# Patient Record
Sex: MALE | Race: WHITE | Employment: OTHER | URBAN - METROPOLITAN AREA
[De-identification: names, ages, dates, MRNs, and addresses within clinical notes are randomized per-mention and may not be internally consistent; named-entity substitution may affect disease eponyms.]

---

## 2017-01-21 DIAGNOSIS — K21.9 GASTROESOPHAGEAL REFLUX DISEASE WITHOUT ESOPHAGITIS: ICD-10-CM

## 2017-01-23 RX ORDER — PANTOPRAZOLE SODIUM 40 MG/1
TABLET, DELAYED RELEASE ORAL
Qty: 30 TABLET | Refills: 1 | Status: SHIPPED | OUTPATIENT
Start: 2017-01-23 | End: 2017-03-22 | Stop reason: SDUPTHER

## 2017-01-24 ENCOUNTER — HOSPITAL ENCOUNTER (OUTPATIENT)
Dept: CT IMAGING | Age: 67
Discharge: HOME | End: 2017-01-24
Attending: RADIOLOGY | Admitting: RADIOLOGY

## 2017-01-24 DIAGNOSIS — C61 MALIGNANT NEOPLASM OF PROSTATE (HCC): ICD-10-CM

## 2017-01-24 LAB
GFR AFRICAN AMERICAN: >60 ML/MIN/1.73M2
GFR NON-AFRICAN AMERICAN: 57 ML/MIN/1.73M2
POC CREATININE: 1.3 MG/DL (ref 0.9–1.3)

## 2017-01-24 RX ORDER — TC 99M MEDRONATE 20 MG/10ML
25 INJECTION, POWDER, LYOPHILIZED, FOR SOLUTION INTRAVENOUS
Status: COMPLETED | OUTPATIENT
Start: 2017-01-24 | End: 2017-01-24

## 2017-01-24 RX ORDER — 0.9 % SODIUM CHLORIDE 0.9 %
10 VIAL (ML) INJECTION
Status: COMPLETED | OUTPATIENT
Start: 2017-01-24 | End: 2017-01-24

## 2017-01-24 RX ADMIN — TC 99M MEDRONATE 25 MILLICURIE: 20 INJECTION, POWDER, LYOPHILIZED, FOR SOLUTION INTRAVENOUS at 10:40

## 2017-01-24 RX ADMIN — Medication 10 ML: at 12:57

## 2017-03-22 DIAGNOSIS — K21.9 GASTROESOPHAGEAL REFLUX DISEASE WITHOUT ESOPHAGITIS: ICD-10-CM

## 2017-03-22 RX ORDER — PANTOPRAZOLE SODIUM 40 MG/1
TABLET, DELAYED RELEASE ORAL
Qty: 30 TABLET | Refills: 1 | Status: SHIPPED | OUTPATIENT
Start: 2017-03-22 | End: 2017-06-26 | Stop reason: SDUPTHER

## 2017-05-22 DIAGNOSIS — K21.9 GASTROESOPHAGEAL REFLUX DISEASE WITHOUT ESOPHAGITIS: ICD-10-CM

## 2017-05-22 RX ORDER — PANTOPRAZOLE SODIUM 40 MG/1
TABLET, DELAYED RELEASE ORAL
Qty: 30 TABLET | Refills: 1 | OUTPATIENT
Start: 2017-05-22

## 2017-06-06 ENCOUNTER — OFFICE VISIT (OUTPATIENT)
Dept: FAMILY MEDICINE CLINIC | Age: 67
End: 2017-06-06

## 2017-06-06 VITALS
HEART RATE: 60 BPM | BODY MASS INDEX: 31.28 KG/M2 | WEIGHT: 236 LBS | HEIGHT: 73 IN | DIASTOLIC BLOOD PRESSURE: 68 MMHG | SYSTOLIC BLOOD PRESSURE: 112 MMHG | OXYGEN SATURATION: 97 % | RESPIRATION RATE: 15 BRPM

## 2017-06-06 DIAGNOSIS — I10 ESSENTIAL HYPERTENSION: ICD-10-CM

## 2017-06-06 DIAGNOSIS — I25.2 HISTORY OF MI (MYOCARDIAL INFARCTION): ICD-10-CM

## 2017-06-06 DIAGNOSIS — C67.2 MALIGNANT NEOPLASM OF LATERAL WALL OF URINARY BLADDER (HCC): Primary | ICD-10-CM

## 2017-06-06 DIAGNOSIS — F10.20 UNCOMPLICATED ALCOHOL DEPENDENCE (HCC): ICD-10-CM

## 2017-06-06 DIAGNOSIS — R35.0 URINARY FREQUENCY: ICD-10-CM

## 2017-06-06 DIAGNOSIS — K21.9 GASTROESOPHAGEAL REFLUX DISEASE WITHOUT ESOPHAGITIS: ICD-10-CM

## 2017-06-06 DIAGNOSIS — Z72.0 TOBACCO USE: ICD-10-CM

## 2017-06-06 DIAGNOSIS — J44.9 COPD, SEVERE (HCC): ICD-10-CM

## 2017-06-06 DIAGNOSIS — N52.31 ERECTILE DYSFUNCTION FOLLOWING RADICAL PROSTATECTOMY: ICD-10-CM

## 2017-06-06 DIAGNOSIS — C61 PROSTATE CANCER (HCC): ICD-10-CM

## 2017-06-06 PROCEDURE — 99214 OFFICE O/P EST MOD 30 MIN: CPT | Performed by: FAMILY MEDICINE

## 2017-06-06 ASSESSMENT — PATIENT HEALTH QUESTIONNAIRE - PHQ9
2. FEELING DOWN, DEPRESSED OR HOPELESS: 1
1. LITTLE INTEREST OR PLEASURE IN DOING THINGS: 1
SUM OF ALL RESPONSES TO PHQ9 QUESTIONS 1 & 2: 2
SUM OF ALL RESPONSES TO PHQ QUESTIONS 1-9: 2

## 2017-06-06 ASSESSMENT — ENCOUNTER SYMPTOMS
NAUSEA: 0
CHEST TIGHTNESS: 0
VOMITING: 0
WHEEZING: 0
SORE THROAT: 0
BLOOD IN STOOL: 0
DIARRHEA: 0
COUGH: 0
ALLERGIC/IMMUNOLOGIC NEGATIVE: 1
CONSTIPATION: 0
SINUS PRESSURE: 0
SHORTNESS OF BREATH: 1
ABDOMINAL PAIN: 0
BACK PAIN: 0
RHINORRHEA: 0

## 2017-06-26 DIAGNOSIS — K21.9 GASTROESOPHAGEAL REFLUX DISEASE WITHOUT ESOPHAGITIS: ICD-10-CM

## 2017-06-26 RX ORDER — PANTOPRAZOLE SODIUM 40 MG/1
40 TABLET, DELAYED RELEASE ORAL DAILY
Qty: 90 TABLET | Refills: 3 | Status: SHIPPED | OUTPATIENT
Start: 2017-06-26 | End: 2017-09-27 | Stop reason: SDUPTHER

## 2017-09-27 ENCOUNTER — OFFICE VISIT (OUTPATIENT)
Dept: FAMILY MEDICINE CLINIC | Age: 67
End: 2017-09-27

## 2017-09-27 VITALS
WEIGHT: 233.2 LBS | BODY MASS INDEX: 30.91 KG/M2 | HEIGHT: 73 IN | OXYGEN SATURATION: 95 % | RESPIRATION RATE: 16 BRPM | HEART RATE: 58 BPM | DIASTOLIC BLOOD PRESSURE: 74 MMHG | SYSTOLIC BLOOD PRESSURE: 102 MMHG

## 2017-09-27 DIAGNOSIS — C64.1 MALIGNANT TUMOR OF KIDNEY, RIGHT (HCC): ICD-10-CM

## 2017-09-27 DIAGNOSIS — C67.2 MALIGNANT NEOPLASM OF LATERAL WALL OF URINARY BLADDER (HCC): ICD-10-CM

## 2017-09-27 DIAGNOSIS — H00.012 HORDEOLUM EXTERNUM OF RIGHT LOWER EYELID: Primary | ICD-10-CM

## 2017-09-27 DIAGNOSIS — K21.9 GASTROESOPHAGEAL REFLUX DISEASE WITHOUT ESOPHAGITIS: ICD-10-CM

## 2017-09-27 PROCEDURE — 99213 OFFICE O/P EST LOW 20 MIN: CPT | Performed by: FAMILY MEDICINE

## 2017-09-27 PROCEDURE — 3017F COLORECTAL CA SCREEN DOC REV: CPT | Performed by: FAMILY MEDICINE

## 2017-09-27 PROCEDURE — G8427 DOCREV CUR MEDS BY ELIG CLIN: HCPCS | Performed by: FAMILY MEDICINE

## 2017-09-27 PROCEDURE — 4004F PT TOBACCO SCREEN RCVD TLK: CPT | Performed by: FAMILY MEDICINE

## 2017-09-27 PROCEDURE — 1123F ACP DISCUSS/DSCN MKR DOCD: CPT | Performed by: FAMILY MEDICINE

## 2017-09-27 PROCEDURE — G8599 NO ASA/ANTIPLAT THER USE RNG: HCPCS | Performed by: FAMILY MEDICINE

## 2017-09-27 PROCEDURE — G8417 CALC BMI ABV UP PARAM F/U: HCPCS | Performed by: FAMILY MEDICINE

## 2017-09-27 PROCEDURE — 4040F PNEUMOC VAC/ADMIN/RCVD: CPT | Performed by: FAMILY MEDICINE

## 2017-09-27 RX ORDER — TETANUS AND DIPHTHERIA TOXOIDS ADSORBED 2; 2 [LF]/.5ML; [LF]/.5ML
0.5 INJECTION INTRAMUSCULAR ONCE
Qty: 0.5 ML | Refills: 0 | Status: CANCELLED | OUTPATIENT
Start: 2017-09-27 | End: 2017-09-27

## 2017-09-27 RX ORDER — PANTOPRAZOLE SODIUM 40 MG/1
40 TABLET, DELAYED RELEASE ORAL DAILY
Qty: 90 TABLET | Refills: 3 | Status: SHIPPED | OUTPATIENT
Start: 2017-09-27 | End: 2018-10-01 | Stop reason: SDUPTHER

## 2017-10-07 PROBLEM — C64.9 MALIGNANT TUMOR OF KIDNEY (HCC): Status: ACTIVE | Noted: 2017-10-07

## 2017-10-07 PROBLEM — H00.012 HORDEOLUM EXTERNUM OF RIGHT LOWER EYELID: Status: ACTIVE | Noted: 2017-10-07

## 2017-10-07 ASSESSMENT — ENCOUNTER SYMPTOMS
RHINORRHEA: 0
VOMITING: 0
SORE THROAT: 0
CONSTIPATION: 0
NAUSEA: 0
SINUS PRESSURE: 0
BACK PAIN: 0
SHORTNESS OF BREATH: 0
BLOOD IN STOOL: 0
ABDOMINAL PAIN: 0
WHEEZING: 0
CHEST TIGHTNESS: 0
DIARRHEA: 0
ALLERGIC/IMMUNOLOGIC NEGATIVE: 1
COUGH: 0

## 2017-10-07 NOTE — PROGRESS NOTES
Inject 0.5 mLs into the muscle once for 1 dose         GERD (gastroesophageal reflux disease)  Better with protonix. Discussed risks and report of osteoporosis and dementia vs benefits. Hordeolum externum of right lower eyelid  Stye on lower R eye lid. No conjunctivitis. Should resolve with warm compresses. Malignant neoplasm of lateral wall of urinary bladder (HCC)  Seeing Dr Adrián Edmonds, had a recent cystoscopy    Malignant tumor of kidney [facility])  Now has a renal tumor - ? Primary vs metastaic bladde cancer. Pt was told bladder cancers ne er migratge to kidney but gthis is transitional cell cancer and pt does lie down so may be reflux driven bladder tumor. Urology managing.

## 2017-10-07 NOTE — ASSESSMENT & PLAN NOTE
Now has a renal tumor - ? Primary vs metastaic bladde cancer. Pt was told bladder cancers ne er migratge to kidney but gthis is transitional cell cancer and pt does lie down so may be reflux driven bladder tumor. Urology managing.

## 2017-12-01 ENCOUNTER — HOSPITAL ENCOUNTER (OUTPATIENT)
Dept: OTHER | Age: 67
Discharge: HOME | End: 2017-12-01
Attending: RADIOLOGY | Admitting: RADIOLOGY

## 2017-12-01 LAB — PSA ULTRASENSITIVE: 0.08 NG/ML (ref 0–4)

## 2018-03-28 LAB
BASOPHILS ABSOLUTE: 0.03 /ΜL
BASOPHILS RELATIVE PERCENT: 0.3 %
BILIRUBIN, URINE: ABNORMAL
BLOOD, URINE: POSITIVE
BUN BLDV-MCNC: 16 MG/DL
CALCIUM SERPL-MCNC: 9.5 MG/DL
CHLORIDE BLD-SCNC: 90 MMOL/L
CLARITY: ABNORMAL
CO2: 24 MMOL/L
COLOR: ABNORMAL
CREAT SERPL-MCNC: 2.03 MG/DL
EOSINOPHILS ABSOLUTE: 0.06 /ΜL
EOSINOPHILS RELATIVE PERCENT: 5.2 %
GFR CALCULATED: 33
GLUCOSE BLD-MCNC: 104 MG/DL
GLUCOSE URINE: NEGATIVE
HCT VFR BLD CALC: 33 % (ref 41–53)
HEMOGLOBIN: 11.1 G/DL (ref 13.5–17.5)
KETONES, URINE: POSITIVE
LEUKOCYTE ESTERASE, URINE: POSITIVE
LYMPHOCYTES ABSOLUTE: 1.05 /ΜL
LYMPHOCYTES RELATIVE PERCENT: 9.3 %
MCH RBC QN AUTO: 28.8 PG
MCHC RBC AUTO-ENTMCNC: 33.6 G/DL
MCV RBC AUTO: 85.5 FL
MONOCYTES ABSOLUTE: 1.13 /ΜL
MONOCYTES RELATIVE PERCENT: 10 %
NEUTROPHILS ABSOLUTE: 8.47 /ΜL
NEUTROPHILS RELATIVE PERCENT: 74.8 %
NITRITE, URINE: POSITIVE
PDW BLD-RTO: 13.9 %
PH UA: 6.5 (ref 4.5–8)
PLATELET # BLD: 298 K/ΜL
PMV BLD AUTO: 9.6 FL
POTASSIUM SERPL-SCNC: 3.6 MMOL/L
PROTEIN UA: POSITIVE
RBC # BLD: 3.86 10^6/ΜL
SODIUM BLD-SCNC: 124 MMOL/L
SPECIFIC GRAVITY, URINE: 1.01
UROBILINOGEN, URINE: ABNORMAL
WBC # BLD: 11.32 10^3/ML

## 2018-06-04 ENCOUNTER — HOSPITAL ENCOUNTER (OUTPATIENT)
Dept: OTHER | Age: 68
Discharge: HOME | End: 2018-06-04
Attending: RADIOLOGY | Admitting: RADIOLOGY

## 2018-06-04 LAB — PSA ULTRASENSITIVE: 0.05 NG/ML (ref 0–4)

## 2018-10-01 DIAGNOSIS — K21.9 GASTROESOPHAGEAL REFLUX DISEASE WITHOUT ESOPHAGITIS: ICD-10-CM

## 2018-10-01 RX ORDER — PANTOPRAZOLE SODIUM 40 MG/1
40 TABLET, DELAYED RELEASE ORAL DAILY
Qty: 90 TABLET | Refills: 0 | Status: SHIPPED | OUTPATIENT
Start: 2018-10-01 | End: 2018-12-31 | Stop reason: SDUPTHER

## 2019-02-05 ENCOUNTER — HOSPITAL ENCOUNTER (OUTPATIENT)
Age: 69
Setting detail: SPECIMEN
Discharge: HOME | End: 2019-02-05
Payer: MEDICARE

## 2019-02-05 LAB — PSA ULTRASENSITIVE: 0.03 NG/ML (ref 0–4)

## 2019-02-05 PROCEDURE — 84153 ASSAY OF PSA TOTAL: CPT

## 2019-07-02 DIAGNOSIS — K21.9 GASTROESOPHAGEAL REFLUX DISEASE WITHOUT ESOPHAGITIS: ICD-10-CM

## 2019-07-02 RX ORDER — PANTOPRAZOLE SODIUM 40 MG/1
40 TABLET, DELAYED RELEASE ORAL DAILY
Qty: 90 TABLET | Refills: 1 | Status: SHIPPED | OUTPATIENT
Start: 2019-07-02 | End: 2020-01-07

## 2020-01-07 RX ORDER — PANTOPRAZOLE SODIUM 40 MG/1
40 TABLET, DELAYED RELEASE ORAL DAILY
Qty: 90 TABLET | Refills: 1 | Status: SHIPPED | OUTPATIENT
Start: 2020-01-07

## 2020-02-25 ENCOUNTER — HOSPITAL ENCOUNTER (OUTPATIENT)
Age: 70
Setting detail: SPECIMEN
Discharge: HOME | End: 2020-02-25
Payer: MEDICARE

## 2020-02-25 LAB — PSA ULTRASENSITIVE: 0.03 NG/ML (ref 0–4)

## 2020-02-25 PROCEDURE — 84153 ASSAY OF PSA TOTAL: CPT

## 2020-02-25 PROCEDURE — 36415 COLL VENOUS BLD VENIPUNCTURE: CPT

## 2020-03-05 ENCOUNTER — HOSPITAL ENCOUNTER (OUTPATIENT)
Dept: RADIATION ONCOLOGY | Age: 70
Discharge: HOME | End: 2020-03-05
Payer: MEDICARE

## 2020-03-05 VITALS
SYSTOLIC BLOOD PRESSURE: 117 MMHG | TEMPERATURE: 98.8 F | WEIGHT: 210 LBS | HEART RATE: 56 BPM | OXYGEN SATURATION: 95 % | RESPIRATION RATE: 16 BRPM | HEIGHT: 74 IN | DIASTOLIC BLOOD PRESSURE: 69 MMHG | BODY MASS INDEX: 26.95 KG/M2

## 2020-03-05 PROCEDURE — 99213 OFFICE O/P EST LOW 20 MIN: CPT | Performed by: RADIOLOGY

## 2020-03-05 PROCEDURE — 99211 OFF/OP EST MAY X REQ PHY/QHP: CPT | Performed by: RADIOLOGY

## 2020-03-05 RX ORDER — TAMSULOSIN HYDROCHLORIDE 0.4 MG/1
0.4 CAPSULE ORAL DAILY
COMMUNITY

## 2020-03-05 ASSESSMENT — PAIN SCALES - GENERAL: PAINLEVEL_OUTOF10: 0

## 2020-03-05 NOTE — PROGRESS NOTES
[id number] [facility]  [address] [facility], [address] [facility]  Phone: [phone]  Fax: [phone]    RADIATION ONCOLOGY FOLLOW UP REPORT    PATIENT NAME:  Stephanie Moise              : 1950  MEDICAL RECORD NO: [id number]    [facility] NO: [id number]        PROVIDER: Lanning Mortimer, MD      DATE OF SERVICE: 3/5/2020     Other Physicians:    TREATMENT COURSE:      [facility]    2015 Initial Diagnosis     pT2c adenoCA Prostate cancer ([facility] [place] 7. PSA=4.68      12/10/2015 Surgery     LARP. +perineural invasion & questionable anterior margin with subsequent biochemical failure of PSA of 0.4 ng per mL on 10/19/16      2017 - 3/24/2017 Radiation     Prostatic fossa: 77 Gray             HPI:   Stephanie Moise is a 68 y.o. male who has a history of superficial bladder cancer status post TURBT/BCG and  and  who also has a history of prostate cancer as above who returns today for routine follow-up visit. He was last seen by me in 2019 and was doing well at that time without evidence of recurrent or metastatic disease. His most recent PSA was obtained on 20 and found to be 0.03 ng per mL. His most recent cystoscopy was performed at [facility] 2 weeks ago showing benign findings. Currently, the patient reports he's been doing well. His energy level has been fairly good overall. He denies any fevers, chills, or drenching night sweats. His weight and appetite have been stable. He denies any chest pain or shortness of breath. He has not noticed any new lumps or bumps anywhere throughout his body. He denies the new onset of bony pain. He has not been experiencing any pain within the abdomen or pelvis. He denies any hematuria, diarrhea, melena, or hematochezia. He has rare mild dysuria. He gets up an either 2-1/2 or 5 hours after going to bed in the every hour until morning subsequently.   He does report

## 2021-03-04 ENCOUNTER — HOSPITAL ENCOUNTER (OUTPATIENT)
Dept: INFUSION THERAPY | Age: 71
Discharge: HOME | End: 2021-03-04
Payer: MEDICARE

## 2021-03-04 DIAGNOSIS — C61 PROSTATE CANCER (HCC): ICD-10-CM

## 2021-03-04 DIAGNOSIS — C61 PROSTATE CANCER (HCC): Primary | ICD-10-CM

## 2021-03-04 LAB — PSA ULTRASENSITIVE: 0.03 NG/ML (ref 0–4)

## 2021-03-04 PROCEDURE — 84153 ASSAY OF PSA TOTAL: CPT

## 2021-03-04 PROCEDURE — 36415 COLL VENOUS BLD VENIPUNCTURE: CPT

## 2021-03-11 ENCOUNTER — HOSPITAL ENCOUNTER (OUTPATIENT)
Dept: RADIATION ONCOLOGY | Age: 71
Discharge: HOME | End: 2021-03-11
Payer: MEDICARE

## 2021-03-11 VITALS
DIASTOLIC BLOOD PRESSURE: 69 MMHG | RESPIRATION RATE: 16 BRPM | BODY MASS INDEX: 27.34 KG/M2 | HEIGHT: 74 IN | WEIGHT: 213 LBS | SYSTOLIC BLOOD PRESSURE: 120 MMHG | HEART RATE: 55 BPM | TEMPERATURE: 98.2 F

## 2021-03-11 DIAGNOSIS — C61 PROSTATE CANCER (HCC): Primary | ICD-10-CM

## 2021-03-11 PROCEDURE — 99211 OFF/OP EST MAY X REQ PHY/QHP: CPT

## 2021-03-11 PROCEDURE — 99213 OFFICE O/P EST LOW 20 MIN: CPT | Performed by: RADIOLOGY

## 2021-03-11 RX ORDER — ACETAMINOPHEN/DIPHENHYDRAMINE 500MG-25MG
2 TABLET ORAL NIGHTLY PRN
COMMUNITY

## 2021-03-11 NOTE — PROGRESS NOTES
[id number] [facility]  [address] [facility], [address] [facility]  Phone: [phone]  Fax: [phone]    RADIATION ONCOLOGY FOLLOW UP REPORT    PATIENT NAME:  Yaron Alvarado              : 1950  MEDICAL RECORD NO: [id number]    [facility] NO: [id number]        PROVIDER: Víctor Lai MD      DATE OF SERVICE: 3/11/2021     Other Physicians:  Meenu Schultz M.D.  [address] [facility] [place] [address]    Dr. Eren Canela        DIAGNOSIS: Cancer Staging  No matching staging information was found for the patient. TREATMENT COURSE:   Oncology History   Prostate cancer ([facility]   2015 Initial Diagnosis    pT2c adenoCA [facility] [place] 7. PSA=4.68     12/10/2015 Surgery    LARP. +perineural invasion & questionable anterior margin with subsequent biochemical failure of PSA of 0.4 ng per mL on 10/19/16     2017 - 3/24/2017 Radiation    Prostatic fossa: 77 Gray             HPI:   Yaron Alvarado is a 65 y.o. male who has a history as above as well as of superficial bladder cancer diagnosed in  status post TURBT and BCG who returns today for routine follow-up visit. He was last seen by me in in 2020 and was doing well at that time without evidence of recurrent or metastatic disease. His most recent PSA was 0.03 ng per ml - see lab results below. Currently, the patient reports he's been doing well. His energy level has been fairly good overall. He denies any fevers, chills, or drenching night sweats. His weight and appetite have been stable. He denies any chest pain or shortness of breath. He has not noticed any new lumps or bumps anywhere throughout his body. He denies the new onset of bony pain. He has not been experiencing any pain within the abdomen or pelvis. He denies any dysuria, hematuria, diarrhea, melena, or hematochezia. He gets up an average of 3-4x at night to urinate.   He reports he has been taking his Flomax in the morning.         LABORATORY STUDIES:   CBC:   Lab Results   Component Value Date    WBC 11.32 03/28/2018    RBC 3.86 03/28/2018    HGB 11.1 03/28/2018    HCT 33.0 03/28/2018    MCV 85.5 03/28/2018    MCH 28.8 03/28/2018    MCHC 33.6 03/28/2018    RDW 13.9 03/28/2018     03/28/2018    MPV 9.6 03/28/2018     CMP:  Lab Results   Component Value Date     03/28/2018    K 3.6 03/28/2018    CL 90 03/28/2018    CO2 24 03/28/2018    BUN 16 03/28/2018    CREATININE 2.03 03/28/2018    GFRAA >60 01/24/2017    AGRATIO 2.0 06/09/2014    LABGLOM 33 03/28/2018    LABGLOM 57 01/24/2017    GLUCOSE 104 03/28/2018    PROT 7.3 11/30/2015    PROT 7.0 03/22/2012    LABALBU 3.7 12/12/2015    CALCIUM 9.5 03/28/2018    BILITOT 0.5 11/30/2015    ALKPHOS 125 11/30/2015    AST 23 11/30/2015    ALT 22 11/30/2015     Onc labs:   3/4/2021  5:34 PM - [facility] Incoming Lab Results From [facility] [facility])    Component Value Ref Range & Units Status Collected Lab   PSA Ultrasensitive 0.03  0 - 4.0 NG/ML Final 03/04/2021  2:10 PM      2/25/2020  5:48 PM - [facility] Incoming Lab Results [facility] [facility]    Component Value Ref Range & Units Status Collected Lab   PSA Ultrasensitive 0.03  0 - 4.0 NG/ML Final 02/25/2020  2:50 PM          Lab Results   Component Value Date    PSA 5.0 06/09/2014        MEDICATIONS:   Current Outpatient Medications   Medication Sig Dispense Refill    diphenhydrAMINE-APAP, sleep, (TYLENOL PM EXTRA STRENGTH)  MG tablet Take 2 tablets by mouth nightly as needed for Sleep      SYMBICORT 160-4.5 MCG/ACT AERO TAKE 2 PUFFS BY MOUTH TWICE A DAY Dx COPD J44.9 10.2 Inhaler 5    tamsulosin (FLOMAX) 0.4 MG capsule Take 0.4 mg by mouth daily      pantoprazole (PROTONIX) 40 MG tablet TAKE 1 TABLET BY MOUTH DAILY 90 tablet 1    SPIRIVA HANDIHALER 18 MCG inhalation capsule INHALE THE CONTENTS OF 1 CAPSULE INTO THE LUNGS DAILY 90 capsule 3    metoprolol tartrate (LOPRESSOR) 50 MG tablet Take 1 tablet by mouth 2 times daily (Patient taking differently: Take 25 mg by mouth 2 times daily ) 60 tablet 1    atorvastatin (LIPITOR) 40 MG tablet Take 1 tablet by mouth daily 30 tablet 0    aspirin 81 MG EC tablet Take 1 tablet by mouth daily 30 tablet 0    triamterene-hydrochlorothiazide (MAXZIDE-25) 37.5-25 MG per tablet Take 1 tablet by mouth daily 30 tablet 11    acetaminophen (TYLENOL) 325 MG tablet Take 650 mg by mouth every 6 hours as needed. No current facility-administered medications for this encounter. EXAMINATION:   Vitals:    03/11/21 1320   BP: 120/69   Pulse: 55   Resp: 16   Temp: 98.2 °F (36.8 °C)     The patient is in no acute distress. Neck: Supple no preauricular postauricular, submental, submandibular, cervical, supraclavicular, or infraclavicular lymphadenopathy is present. Heart: Regular rate and rhythm. No murmurs, clicks, or gallops are present. Lungs: Bilaterally clear to auscultation. No rales, rhonchi, or wheezing are present. Abdomen: Soft, nontender and nondistended. No hepatosplenomegaly or masses are appreciated. Rectal exam: The prostatic fossa is without masses or areas of nodularity. Extremities: No cyanosis, clubbing, or edema is present. ASSESSMENT AND PLAN:     Ozzie Luna is a 65 y.o. male who has a history of prostate cancer who is now approximately 4 years after completion of his salvage prostatic fossa IMRT who is doing well this time without evidence of recurrent or metastatic disease. He is to follow up with urology as scheduled. He is to return to see me in 12 months with a PSA prior, or to return sooner as needed. He is to follow-up with Dr. Haroon Neves next week for a repeat cystoscopy as scheduled. The patient is to continue to follow CDC's Covid 19 precautionary measures.         Electronically signed by Wes Chaney MD on 3/11/2021 at 1:27 PM

## 2021-03-11 NOTE — PROGRESS NOTES
[place] Hull  3/11/2021    Patient is seen today for follow up. Vitals:    03/11/21 1320   BP: 120/69   Pulse: 55   Resp: 16   Temp: 98.2 °F (36.8 °C)        Wt Readings from Last 3 Encounters:   03/11/21 213 lb (96.6 kg)   01/04/21 207 lb (93.9 kg)   08/31/20 207 lb (93.9 kg)       Pain Assessment  Pain Assessment: 0-10  Pain Level: 0  Patient's Stated Pain Goal: No pain  Multiple Pain Sites: No  OTC Analgesics       Allergies   Allergen Reactions    Lisinopril Other (See Comments)     Cough        Current Outpatient Medications   Medication Sig Dispense Refill    diphenhydrAMINE-APAP, sleep, (TYLENOL PM EXTRA STRENGTH)  MG tablet Take 2 tablets by mouth nightly as needed for Sleep      SYMBICORT 160-4.5 MCG/ACT AERO TAKE 2 PUFFS BY MOUTH TWICE A DAY Dx COPD J44.9 10.2 Inhaler 5    tamsulosin (FLOMAX) 0.4 MG capsule Take 0.4 mg by mouth daily      pantoprazole (PROTONIX) 40 MG tablet TAKE 1 TABLET BY MOUTH DAILY 90 tablet 1    SPIRIVA HANDIHALER 18 MCG inhalation capsule INHALE THE CONTENTS OF 1 CAPSULE INTO THE LUNGS DAILY 90 capsule 3    metoprolol tartrate (LOPRESSOR) 50 MG tablet Take 1 tablet by mouth 2 times daily (Patient taking differently: Take 25 mg by mouth 2 times daily ) 60 tablet 1    atorvastatin (LIPITOR) 40 MG tablet Take 1 tablet by mouth daily 30 tablet 0    aspirin 81 MG EC tablet Take 1 tablet by mouth daily 30 tablet 0    triamterene-hydrochlorothiazide (MAXZIDE-25) 37.5-25 MG per tablet Take 1 tablet by mouth daily 30 tablet 11    acetaminophen (TYLENOL) 325 MG tablet Take 650 mg by mouth every 6 hours as needed. No current facility-administered medications for this encounter. Additional Comments: Pt here for routine follow up. Pt states appointment next week at [place] for cystoscopy. Denies any new complaints or concerns at this time.      Electronically signed by Curt Paz RN on 3/11/2021 at 1:23 PM

## 2021-12-09 ENCOUNTER — OFFICE VISIT (OUTPATIENT)
Dept: FAMILY MEDICINE CLINIC | Age: 71
End: 2021-12-09
Payer: MEDICARE

## 2021-12-09 VITALS
DIASTOLIC BLOOD PRESSURE: 60 MMHG | OXYGEN SATURATION: 97 % | BODY MASS INDEX: 27.44 KG/M2 | SYSTOLIC BLOOD PRESSURE: 100 MMHG | RESPIRATION RATE: 16 BRPM | TEMPERATURE: 96.9 F | WEIGHT: 208 LBS | HEART RATE: 71 BPM

## 2021-12-09 DIAGNOSIS — L03.119 CELLULITIS OF FOOT: Primary | ICD-10-CM

## 2021-12-09 DIAGNOSIS — Z13.220 LIPID SCREENING: ICD-10-CM

## 2021-12-09 DIAGNOSIS — B35.3 TINEA PEDIS OF RIGHT FOOT: ICD-10-CM

## 2021-12-09 DIAGNOSIS — Z87.891 PERSONAL HISTORY OF TOBACCO USE: ICD-10-CM

## 2021-12-09 LAB — HBA1C MFR BLD: 4.4 %

## 2021-12-09 PROCEDURE — 99204 OFFICE O/P NEW MOD 45 MIN: CPT | Performed by: FAMILY MEDICINE

## 2021-12-09 PROCEDURE — 4040F PNEUMOC VAC/ADMIN/RCVD: CPT | Performed by: FAMILY MEDICINE

## 2021-12-09 PROCEDURE — 1123F ACP DISCUSS/DSCN MKR DOCD: CPT | Performed by: FAMILY MEDICINE

## 2021-12-09 PROCEDURE — 4004F PT TOBACCO SCREEN RCVD TLK: CPT | Performed by: FAMILY MEDICINE

## 2021-12-09 PROCEDURE — G8484 FLU IMMUNIZE NO ADMIN: HCPCS | Performed by: FAMILY MEDICINE

## 2021-12-09 PROCEDURE — G8417 CALC BMI ABV UP PARAM F/U: HCPCS | Performed by: FAMILY MEDICINE

## 2021-12-09 PROCEDURE — G8427 DOCREV CUR MEDS BY ELIG CLIN: HCPCS | Performed by: FAMILY MEDICINE

## 2021-12-09 PROCEDURE — 3017F COLORECTAL CA SCREEN DOC REV: CPT | Performed by: FAMILY MEDICINE

## 2021-12-09 PROCEDURE — 83036 HEMOGLOBIN GLYCOSYLATED A1C: CPT | Performed by: FAMILY MEDICINE

## 2021-12-09 RX ORDER — OXYCODONE HYDROCHLORIDE 5 MG/1
5 TABLET ORAL EVERY 6 HOURS PRN
COMMUNITY
Start: 2021-03-15

## 2021-12-09 RX ORDER — CEPHALEXIN 500 MG/1
500 CAPSULE ORAL 4 TIMES DAILY
Qty: 28 CAPSULE | Refills: 0 | Status: SHIPPED | OUTPATIENT
Start: 2021-12-09 | End: 2021-12-16

## 2021-12-09 ASSESSMENT — PATIENT HEALTH QUESTIONNAIRE - PHQ9
1. LITTLE INTEREST OR PLEASURE IN DOING THINGS: 0
SUM OF ALL RESPONSES TO PHQ QUESTIONS 1-9: 0
SUM OF ALL RESPONSES TO PHQ QUESTIONS 1-9: 0
SUM OF ALL RESPONSES TO PHQ9 QUESTIONS 1 & 2: 0
SUM OF ALL RESPONSES TO PHQ QUESTIONS 1-9: 0
2. FEELING DOWN, DEPRESSED OR HOPELESS: 0

## 2021-12-09 NOTE — PROGRESS NOTES
[facility]  [address]  [address] [place] [address]  Dept: [phone]  Loc: [phone]        ASSESSMENT/PLAN:    1. Cellulitis of foot  -     cephALEXin (KEFLEX) 500 MG capsule; Take 1 capsule by mouth 4 times daily for 7 days, Disp-28 capsule, R-0Normal  - Patient has a history of bladder cancer status post radiation/chemotherapy. Patient likely has  secondary bacterial infection of his feet/toe in addition to  nehemiah pedis/corporis. Patient was advised to take medications prescribed. - POCT glycosylated hemoglobin (Hb A1C)   2. Personal history of tobacco use  3. Tinea pedis of right foot  -     terbinafine (LAMISIL) 1 % cream; Apply topically 2 times daily. , Disp-42 g, R-2, Normal  4. Lipid screening  -     COMPREHENSIVE METABOLIC PANEL; Future  -     LIPID PANEL; Future  Return in about 2 months (around 2/9/2022). Patient's Name: Markell Wallace   YOB: 1950   Age: 65 y.o. Date of service: 12/10/2021    Chief Complaint:   Chief Complaint   Patient presents with   [facility]    Foot Swelling    Rash     on foot        Patient ID: Markell Wallace is a 65 y.o. male. Chief Complaint   Patient presents with   [address] Annual Exam    Foot Swelling    Rash     on foot       HPI    Patient is a 38-year-old with a history of CAD, COPD, nephrectomy, and multiple other medical conditions who presents to the office for skin rash and toe redness . Patient reports extensive itchiness and dryness of skin that is been going on for a long time, which patient attributes  to his frequent intake of Tylenol. Patient reports that he is soaking his feet on Epsom salt with minimal improvement.  Patient denies change of soap, lotion or new foods   12 point review of systems were negative other than in the HPI     Physical Exam  General: alert, awake, and oriented to time, place, person, and situation. Not in acute distress   Ear, nose, throat, Head: Normal external ears, pupils are equally round, EOMI, normocephalic/atraumatic  Heart: regular rate and rhythm, no audible friction rub  Lungs: clear to auscultation bilaterally, no audible crackles, no audible wheezes, no audible rhonchi  Abdomen: normal bowel sounds, soft abdomen, non-tender, no palpable masses  Extremities: scaly and erythematous rash around left 5th toe  Skin: Dry, scaly and itchy rash  Peripheral vascular: 2+ pulses symmetric (radial)  Neuro: sensation intact and symmetric  Psych: normal affect, normal thoughts     Patient History:  Past Medical History:   Diagnosis Date    CAD (coronary artery disease)     Sees Dr[facility])     bladder, prostate and right kidney    Carotid stenosis     Chronic back pain     low back    COPD (chronic obstructive pulmonary disease) (HCC)     Erectile dysfunction     Fatigue     Hematuria, gross     Bladder tumor surgery scheduled for 6/30/14.     History of external beam radiation therapy 2017    Prostate 6,600 cGy [facility]    Hyperlipidemia     Hypertension     MI (mitral incompetence)     MI ([facility]ocardial infarction) ([facility] 2010    Osteoarthritis     low back    Peyronie's disease     Pneumothorax 2002    hemothorax - s/p fall - had chest tube    Retinal detachment     left    Sciatica      Past Surgical History:   Procedure Laterality Date    APPENDECTOMY  1957    BLADDER TUMOR EXCISION  06/30/14    TURB w r stent placement    CARDIOVASCULAR STRESS TEST  6/2011 & 6/23/2014    COLONOSCOPY  10/16/2014    polyps times seven, diverticulosis    EYE SURGERY  2011    cataract removal    EYE SURGERY  2004    retinal detachment    INCISIONAL HERNIA REPAIR N/A 12/10/2015    NASAL HEMORRHAGE CONTROL  1991    PROSTATECTOMY N/A 12/10/2015    robotic assisted laporascopic    TONSILLECTOMY AND ADENOIDECTOMY  childhood    T[facility]Y Right 2018    at [facility]  UMBILICAL HERNIA REPAIR N/A 12/10/2015     Social History     Socioeconomic History    Marital status:      Spouse name: Not on file    Number of children: Not on file    Years of education: Not on file    Highest education level: Not on file   Occupational History    Occupation: [employer]   Tobacco Use    Smoking status: Current Every Day Smoker     Packs/day: 2.00     Years: 40.00     Pack years: 80.00     Types: Cigarettes    Smokeless tobacco: Current User   Substance and Sexual Activity    Alcohol use: Yes     Alcohol/week: 0.0 standard drinks     Comment: 2 vodka martinis daily      CAFFEINE: 2 cups coffee & can pop daily    Drug use: No    Sexual activity: Not on file   Other Topics Concern    Not on file   Social History Narrative    Not on file     Social Determinants of Health     Financial Resource Strain:     Difficulty of Paying Living Expenses: Not on file   Food Insecurity:     Worried About Running Out of Food in the Last Year: Not on file    Isaias of Food in the Last Year: Not on file   Transportation Needs:     Lack of Transportation (Medical): Not on file    Lack of Transportation (Non-Medical):  Not on file   Physical Activity:     Days of Exercise per Week: Not on file    Minutes of Exercise per Session: Not on file   Stress:     Feeling of Stress : Not on file   Social Connections:     Frequency of Communication with Friends and Family: Not on file    Frequency of Social Gatherings with Friends and Family: Not on file    Attends Rastafari Services: Not on file    Active Member of Clubs or Organizations: Not on file    Attends Club or Organization Meetings: Not on file    Marital Status: Not on file   Intimate Partner Violence:     Fear of Current or Ex-Partner: Not on file    Emotionally Abused: Not on file    Physically Abused: Not on file    Sexually Abused: Not on file   Housing Stability:     Unable to Pay for Housing in the Last Year: Not on file  Number of Places Lived in the Last Year: Not on file    Unstable Housing in the Last Year: Not on file     Immunization History   Administered Date(s) Administered    Influenza Virus Vaccine 11/30/2015, 12/05/2018    Pneumococcal Conjugate 13-valent ([id number]) 11/30/2015    Pneumococcal Conjugate 7-valent (Prevnar7) 09/06/2011    Pneumococcal Polysaccharide ([id number]) 12/24/2013, 12/05/2018    Tdap (Boostrix, Adacel) 01/03/2005     Allergies   Allergen Reactions    Lisinopril Other (See Comments)     Cough     Family History   Problem Relation Age of Onset    Cancer Mother         colon, kidney, liver    Prostate Cancer Brother     Depression Brother     High Blood Pressure Brother     Other Father         AAA    High Blood Pressure Father         possible    Substance Abuse Brother         alcohol, tobacco    Cancer Brother         prostate         Current Outpatient Medications   Medication Sig Dispense Refill    oxyCODONE (ROXICODONE) 5 MG immediate release tablet Take 5 mg by mouth every 6 hours as needed.  terbinafine (LAMISIL) 1 % cream Apply topically 2 times daily.  42 g 2    cephALEXin (KEFLEX) 500 MG capsule Take 1 capsule by mouth 4 times daily for 7 days 28 capsule 0    diphenhydrAMINE-APAP, sleep, (TYLENOL PM EXTRA STRENGTH)  MG tablet Take 2 tablets by mouth nightly as needed for Sleep      tamsulosin (FLOMAX) 0.4 MG capsule Take 0.4 mg by mouth daily      pantoprazole (PROTONIX) 40 MG tablet TAKE 1 TABLET BY MOUTH DAILY 90 tablet 1    SPIRIVA HANDIHALER 18 MCG inhalation capsule INHALE THE CONTENTS OF 1 CAPSULE INTO THE LUNGS DAILY 90 capsule 3    metoprolol tartrate (LOPRESSOR) 50 MG tablet Take 1 tablet by mouth 2 times daily (Patient taking differently: Take 25 mg by mouth 2 times daily ) 60 tablet 1    atorvastatin (LIPITOR) 40 MG tablet Take 1 tablet by mouth daily 30 tablet 0    aspirin 81 MG EC tablet Take 1 tablet by mouth daily 30 tablet 0    triamterene-hydrochlorothiazide (MAXZIDE-25) 37.5-25 MG per tablet Take 1 tablet by mouth daily 30 tablet 11    acetaminophen (TYLENOL) 325 MG tablet Take 650 mg by mouth every 6 hours as needed.  SYMBICORT 160-4.5 MCG/ACT AERO TAKE 2 PUFFS BY MOUTH TWICE A DAY Dx COPD J44.9 (Patient not taking: Reported on 12/9/2021) 10.2 Inhaler 5     No current facility-administered medications for this visit. Objective:  Vitals:  Vitals:    12/09/21 1414   BP: 100/60   Pulse: 71   Resp: 16   Temp: 96.9 °F (36.1 °C)   SpO2: 97%      BP Readings from Last 4 Encounters:   12/09/21 100/60   03/11/21 120/69   03/05/20 117/69   09/27/17 102/74      Body mass index is 27.44 kg/m². All questions answered to patient's satisfaction. The patient was counseled regarding impressions, instructions for management and importance of compliance with treatment. Return in about 2 months (around 2/9/2022).     Kimberly Pedroza MD

## 2022-03-10 ENCOUNTER — HOSPITAL ENCOUNTER (OUTPATIENT)
Dept: INFUSION THERAPY | Age: 72
Discharge: HOME | End: 2022-03-10
Payer: MEDICARE

## 2022-03-10 DIAGNOSIS — C61 PROSTATE CANCER (HCC): ICD-10-CM

## 2022-03-10 LAB — PSA ULTRASENSITIVE: 0.02 NG/ML (ref 0–4)

## 2022-03-10 PROCEDURE — 36415 COLL VENOUS BLD VENIPUNCTURE: CPT

## 2022-03-10 PROCEDURE — 84153 ASSAY OF PSA TOTAL: CPT

## 2022-03-17 ENCOUNTER — HOSPITAL ENCOUNTER (OUTPATIENT)
Dept: RADIATION ONCOLOGY | Age: 72
Discharge: HOME | End: 2022-03-17
Payer: MEDICARE

## 2022-03-17 ENCOUNTER — HOSPITAL ENCOUNTER (OUTPATIENT)
Age: 72
Setting detail: SPECIMEN
Discharge: HOME | End: 2022-03-17
Payer: MEDICARE

## 2022-03-17 ENCOUNTER — HOSPITAL ENCOUNTER (OUTPATIENT)
Dept: INFUSION THERAPY | Age: 72
Discharge: HOME | End: 2022-03-17

## 2022-03-17 VITALS
DIASTOLIC BLOOD PRESSURE: 77 MMHG | HEIGHT: 74 IN | BODY MASS INDEX: 25.64 KG/M2 | OXYGEN SATURATION: 98 % | WEIGHT: 199.8 LBS | SYSTOLIC BLOOD PRESSURE: 119 MMHG | HEART RATE: 60 BPM | TEMPERATURE: 98.2 F | RESPIRATION RATE: 16 BRPM

## 2022-03-17 DIAGNOSIS — R35.0 URINARY FREQUENCY: Primary | ICD-10-CM

## 2022-03-17 LAB
BACTERIA: ABNORMAL /HPF
BILIRUBIN URINE: NEGATIVE MG/DL
BLOOD, URINE: ABNORMAL
CLARITY: ABNORMAL
COLOR: YELLOW
GLUCOSE, URINE: NEGATIVE MG/DL
KETONES, URINE: NEGATIVE MG/DL
LEUKOCYTE ESTERASE, URINE: NEGATIVE
NITRITE URINE, QUANTITATIVE: NEGATIVE
PH, URINE: 7 (ref 5–8)
PROTEIN UA: NEGATIVE MG/DL
RBC URINE: ABNORMAL /HPF (ref 0–3)
SPECIFIC GRAVITY UA: 1.01 (ref 1–1.03)
SQUAMOUS EPITHELIAL: <1 /HPF
TRICHOMONAS: ABNORMAL /HPF
UROBILINOGEN, URINE: 0.2 MG/DL (ref 0.2–1)
WBC CLUMP: ABNORMAL /HPF
WBC UA: 132 /HPF (ref 0–2)

## 2022-03-17 PROCEDURE — 87186 SC STD MICRODIL/AGAR DIL: CPT

## 2022-03-17 PROCEDURE — 87077 CULTURE AEROBIC IDENTIFY: CPT

## 2022-03-17 PROCEDURE — 99213 OFFICE O/P EST LOW 20 MIN: CPT | Performed by: RADIOLOGY

## 2022-03-17 PROCEDURE — 87086 URINE CULTURE/COLONY COUNT: CPT

## 2022-03-17 PROCEDURE — 81001 URINALYSIS AUTO W/SCOPE: CPT

## 2022-03-17 ASSESSMENT — PAIN SCALES - GENERAL: PAINLEVEL_OUTOF10: 0

## 2022-03-17 NOTE — PROGRESS NOTES
Karmen Claude  3/17/2022    Patient is seen today for follow up. Vitals:    03/17/22 1309   BP: 119/77   Pulse: 60   Resp: 16   Temp: 98.2 °F (36.8 °C)   SpO2: 98%        Oxygen Therapy  SpO2: 98 %  Pulse Oximeter Device Mode: Intermittent  Pulse Oximeter Device Location: Right,Finger  O2 Device: None (Room air)    Wt Readings from Last 3 Encounters:   03/17/22 199 lb 12.8 oz (90.6 kg)   02/07/22 207 lb (93.9 kg)   12/09/21 208 lb (94.3 kg)       Pain Assessment  Pain Assessment: 0-10  Pain Level: 0  Patient's Stated Pain Goal: No pain  Multiple Pain Sites: No  Denies Need for Intervention       Allergies   Allergen Reactions    Lisinopril Other (See Comments)     Cough        Current Outpatient Medications   Medication Sig Dispense Refill    fluticasone-salmeterol (WIXELA INHUB) 250-50 MCG/DOSE AEPB Inhale 1 puff into the lungs every 12 hours      oxyCODONE (ROXICODONE) 5 MG immediate release tablet Take 5 mg by mouth every 6 hours as needed.  terbinafine (LAMISIL) 1 % cream Apply topically 2 times daily.  42 g 2    diphenhydrAMINE-APAP, sleep, (TYLENOL PM EXTRA STRENGTH)  MG tablet Take 2 tablets by mouth nightly as needed for Sleep      tamsulosin (FLOMAX) 0.4 MG capsule Take 0.4 mg by mouth daily      pantoprazole (PROTONIX) 40 MG tablet TAKE 1 TABLET BY MOUTH DAILY 90 tablet 1    SPIRIVA HANDIHALER 18 MCG inhalation capsule INHALE THE CONTENTS OF 1 CAPSULE INTO THE LUNGS DAILY 90 capsule 3    metoprolol tartrate (LOPRESSOR) 50 MG tablet Take 1 tablet by mouth 2 times daily (Patient taking differently: Take 25 mg by mouth 2 times daily ) 60 tablet 1    atorvastatin (LIPITOR) 40 MG tablet Take 1 tablet by mouth daily 30 tablet 0    aspirin 81 MG EC tablet Take 1 tablet by mouth daily 30 tablet 0    triamterene-hydrochlorothiazide (MAXZIDE-25) 37.5-25 MG per tablet Take 1 tablet by mouth daily 30 tablet 11    acetaminophen (TYLENOL) 325 MG tablet Take 650 mg by mouth every 6 hours as needed. No current facility-administered medications for this encounter. Additional Comments: Pt here for routine follow up with Dr. Khari Husain. Pt reports urinary urgency and frequency, only voids small amounts each time x past few weeks. State has appt with [facility] w/ Dr. Lonnie Nino  for Cystoscopy next week. Pt does c/o some constipation, denies c/o pain.      Electronically signed by Grisel Meek RN on 3/17/2022 at 1:14 PM

## 2022-03-17 NOTE — PROGRESS NOTES
[address] [address], [address] [facility]  Phone: [phone]  Fax: [phone]    RADIATION ONCOLOGY FOLLOW UP REPORT    PATIENT NAME:  Giacomo Hyde              : 1950  MEDICAL RECORD NO: [id number]    [facility] NO: [id number]        PROVIDER: Maggie Jones MD      DATE OF SERVICE: 3/17/2022     Other Physicians:  Mya Bland M.D.  [address], [facility] [address]        DIAGNOSIS: Cancer Staging  No matching staging information was found for the patient. TREATMENT COURSE:   Oncology History   Prostate cancer ([facility] [address]   2015 Initial Diagnosis    pT2c adenoCA Prostate cancer ([facility] [address] [place]. PSA=4.68     12/10/2015 Surgery    LARP. +perineural invasion & questionable anterior margin with subsequent biochemical failure of PSA of 0.4 ng per mL on 10/19/16     2017 - 3/24/2017 Radiation    Prostatic fossa: 77 [address]             HPI:   Giacomo Hyde is a 66 y.o. male who has a history as above as well as of superficial bladder cancer diagnosed in 2014 status post TURBT and BCG and who has a history as above who returns today for routine follow-up visit. He was last seen by me in 2021 and was doing well at that time without evidence of recurrent or metastatic disease. His most recent PSA was obtained on 3/10/2022 and noted to be 0.02 ng/mL. Currently, the patient reports he's been doing well. His energy level has been fairly good overall. He denies any fevers, chills, or drenching night sweats. His weight and appetite have been stable. He denies any chest pain or shortness of breath. He has not noticed any new lumps or bumps anywhere throughout his body. He denies the new onset of bony pain. He has not been experiencing any pain within the abdomen or pelvis. He denies any dysuria, hematuria, diarrhea, melena, or hematochezia.   He reports having some recent constipation for which he took 2 Dulcolax last night with a small bowel movement today. He gets up an average of every 2 hours at night to urinate the past 2 months on average. He also complains of a pressure sensation towards the end of voiding and some urinary urgency/frequency. He is currently taking 1 Flomax daily. LABORATORY STUDIES:   CBC:   Lab Results   Component Value Date    WBC 11.32 03/28/2018    RBC 3.86 03/28/2018    HGB 11.1 03/28/2018    HCT 33.0 03/28/2018    MCV 85.5 03/28/2018    MCH 28.8 03/28/2018    MCHC 33.6 03/28/2018    RDW 13.9 03/28/2018     03/28/2018    MPV 9.6 03/28/2018     CMP:  Lab Results   Component Value Date     03/28/2018    K 3.6 03/28/2018    CL 90 03/28/2018    CO2 24 03/28/2018    BUN 16 03/28/2018    CREATININE 2.03 03/28/2018    GFRAA >60 01/24/2017    AGRATIO 2.0 06/09/2014    LABGLOM 33 03/28/2018    LABGLOM 57 01/24/2017    GLUCOSE 104 03/28/2018    PROT 7.3 11/30/2015    PROT 7.0 03/22/2012    LABALBU 3.7 12/12/2015    CALCIUM 9.5 03/28/2018    BILITOT 0.5 11/30/2015    ALKPHOS 125 11/30/2015    AST 23 11/30/2015    ALT 22 11/30/2015     Onc labs:   Lab Results   Component Value Date    PSA 5.0 06/09/2014      Component Ref Range & Units 3/10/22 1305 3/4/21 1410 2/25/20 1450 2/5/19 1340 6/4/18 1416 12/1/17 1420   PSA Ultrasensitive 0 - 4.0 NG/ML 0.02  0.03 CM  0.03 CM  0.03  0.05  0.08        MEDICATIONS:   Current Outpatient Medications   Medication Sig Dispense Refill    fluticasone-salmeterol (WIXELA INHUB) 250-50 MCG/DOSE AEPB Inhale 1 puff into the lungs every 12 hours      oxyCODONE (ROXICODONE) 5 MG immediate release tablet Take 5 mg by mouth every 6 hours as needed.  terbinafine (LAMISIL) 1 % cream Apply topically 2 times daily.  42 g 2    diphenhydrAMINE-APAP, sleep, (TYLENOL PM EXTRA STRENGTH)  MG tablet Take 2 tablets by mouth nightly as needed for Sleep      tamsulosin (FLOMAX) 0.4 MG capsule Take 0.4 mg by mouth daily      pantoprazole (PROTONIX) 40 MG tablet TAKE 1 TABLET BY MOUTH DAILY 90 tablet 1    SPIRIVA HANDIHALER 18 MCG inhalation capsule INHALE THE CONTENTS OF 1 CAPSULE INTO THE LUNGS DAILY 90 capsule 3    metoprolol tartrate (LOPRESSOR) 50 MG tablet Take 1 tablet by mouth 2 times daily (Patient taking differently: Take 25 mg by mouth 2 times daily ) 60 tablet 1    atorvastatin (LIPITOR) 40 MG tablet Take 1 tablet by mouth daily 30 tablet 0    aspirin 81 MG EC tablet Take 1 tablet by mouth daily 30 tablet 0    triamterene-hydrochlorothiazide (MAXZIDE-25) 37.5-25 MG per tablet Take 1 tablet by mouth daily 30 tablet 11    acetaminophen (TYLENOL) 325 MG tablet Take 650 mg by mouth every 6 hours as needed. No current facility-administered medications for this encounter. EXAMINATION:   Vitals:    03/17/22 1309   BP: 119/77   Pulse: 60   Resp: 16   Temp: 98.2 °F (36.8 °C)   SpO2: 98%     The patient is in no acute distress. Neck: Supple no preauricular postauricular, submental, submandibular, cervical, supraclavicular, or infraclavicular lymphadenopathy is present. Heart: Regular rate and rhythm. No murmurs, clicks, or gallops are present. Lungs: Bilaterally clear to auscultation. No rales, rhonchi, or wheezing are present. Abdomen: Soft, nontender and nondistended. No hepatosplenomegaly or masses are appreciated. Rectal exam: The prostatic fossa is without masses or areas of nodularity   Extremities: No cyanosis, clubbing, or edema is present. ASSESSMENT AND PLAN:     Ever Kerr is a 66 y.o. male who has a history as above as well as of superficial bladder cancer diagnosed in 2014 status post TURBT and BCG and who has a history of prostate cancer who is now approximately 5 years after completion of his prostatic fossa IMRT who is doing well this time without evidence of recurrent or metastatic disease. We will obtain a UA C&S today. He is to continue Flomax.   He is to follow up with urology on Monday, as scheduled for repeat cystoscopy. As he is 5 years out, doing well, and seeing urology regularly, if his UA is unremarkable, I will release him from my care. The patient is to continue to follow CDC's Covid 19 precautionary measures.       Electronically signed by Wilbert Negron MD on 3/17/2022 at 1:16 PM

## 2022-03-19 LAB
CULTURE: ABNORMAL
CULTURE: ABNORMAL
Lab: ABNORMAL
SPECIMEN: ABNORMAL

## 2022-03-22 ENCOUNTER — TELEPHONE (OUTPATIENT)
Dept: RADIATION ONCOLOGY | Age: 72
End: 2022-03-22

## 2022-03-22 RX ORDER — LEVOFLOXACIN 750 MG/1
750 TABLET ORAL DAILY
Qty: 7 TABLET | Refills: 0 | Status: SHIPPED | OUTPATIENT
Start: 2022-03-22 | End: 2022-03-29

## 2022-03-22 NOTE — TELEPHONE ENCOUNTER
UA and C/S results reviewed per Dr. Devaughn Plascencia, rx for Levaquin 750mg, one PO daily x 7 days sent to [facility]. Pt notified of above and advised to call if no improvement in symptoms or any further questions or concerns, pt voices understanding.

## 2022-11-22 ENCOUNTER — HOSPITAL ENCOUNTER (INPATIENT)
Age: 72
LOS: 3 days | Discharge: HOME | DRG: 193 | End: 2022-11-25
Attending: EMERGENCY MEDICINE | Admitting: STUDENT IN AN ORGANIZED HEALTH CARE EDUCATION/TRAINING PROGRAM
Payer: MEDICARE

## 2022-11-22 ENCOUNTER — APPOINTMENT (OUTPATIENT)
Dept: CT IMAGING | Age: 72
DRG: 193 | End: 2022-11-22
Payer: MEDICARE

## 2022-11-22 ENCOUNTER — APPOINTMENT (OUTPATIENT)
Dept: GENERAL RADIOLOGY | Age: 72
DRG: 193 | End: 2022-11-22
Payer: MEDICARE

## 2022-11-22 DIAGNOSIS — J10.1 INFLUENZA A: Primary | ICD-10-CM

## 2022-11-22 DIAGNOSIS — E87.70 HYPERVOLEMIA, UNSPECIFIED HYPERVOLEMIA TYPE: ICD-10-CM

## 2022-11-22 DIAGNOSIS — J96.01 ACUTE RESPIRATORY FAILURE WITH HYPOXIA AND HYPERCAPNIA (HCC): ICD-10-CM

## 2022-11-22 DIAGNOSIS — J96.02 ACUTE RESPIRATORY FAILURE WITH HYPOXIA AND HYPERCAPNIA (HCC): ICD-10-CM

## 2022-11-22 DIAGNOSIS — J90 PLEURAL EFFUSION: ICD-10-CM

## 2022-11-22 LAB
ALBUMIN SERPL-MCNC: 4.3 GM/DL (ref 3.4–5)
ALP BLD-CCNC: 121 IU/L (ref 40–129)
ALT SERPL-CCNC: 19 U/L (ref 10–40)
ANION GAP SERPL CALCULATED.3IONS-SCNC: 12 MMOL/L (ref 4–16)
AST SERPL-CCNC: 22 IU/L (ref 15–37)
BASE EXCESS MIXED: 3.3 (ref 0–1.2)
BASE EXCESS MIXED: 5.1 (ref 0–1.2)
BASOPHILS ABSOLUTE: 0 K/CU MM
BASOPHILS RELATIVE PERCENT: 0.5 % (ref 0–1)
BILIRUB SERPL-MCNC: 0.7 MG/DL (ref 0–1)
BUN BLDV-MCNC: 15 MG/DL (ref 6–23)
CALCIUM SERPL-MCNC: 9.7 MG/DL (ref 8.3–10.6)
CARBON MONOXIDE, BLOOD: 2.1 % (ref 0–5)
CHLORIDE BLD-SCNC: 90 MMOL/L (ref 99–110)
CO2 CONTENT: 29.1 MMOL/L (ref 19–24)
CO2: 28 MMOL/L (ref 21–32)
COMMENT: ABNORMAL
COMMENT: ABNORMAL
CREAT SERPL-MCNC: 1.2 MG/DL (ref 0.9–1.3)
DIFFERENTIAL TYPE: ABNORMAL
EOSINOPHILS ABSOLUTE: 0.1 K/CU MM
EOSINOPHILS RELATIVE PERCENT: 1.1 % (ref 0–3)
GFR SERPL CREATININE-BSD FRML MDRD: >60 ML/MIN/1.73M2
GLUCOSE BLD-MCNC: 94 MG/DL (ref 70–99)
HCO3 ARTERIAL: 27.8 MMOL/L (ref 18–23)
HCO3 VENOUS: 34.1 MMOL/L (ref 19–25)
HCT VFR BLD CALC: 42.7 % (ref 42–52)
HEMOGLOBIN: 14.1 GM/DL (ref 13.5–18)
IMMATURE NEUTROPHIL %: 0.9 % (ref 0–0.43)
LYMPHOCYTES ABSOLUTE: 0.5 K/CU MM
LYMPHOCYTES RELATIVE PERCENT: 7.7 % (ref 24–44)
MCH RBC QN AUTO: 34.5 PG (ref 27–31)
MCHC RBC AUTO-ENTMCNC: 33 % (ref 32–36)
MCV RBC AUTO: 104.4 FL (ref 78–100)
METHEMOGLOBIN ARTERIAL: 1.5 %
MONOCYTES ABSOLUTE: 0.8 K/CU MM
MONOCYTES RELATIVE PERCENT: 11.8 % (ref 0–4)
NUCLEATED RBC %: 0 %
O2 SAT, VEN: 60.9 % (ref 50–70)
O2 SATURATION: 92.8 % (ref 96–97)
PCO2 ARTERIAL: 41 MMHG (ref 32–45)
PCO2, VEN: 71 MMHG (ref 38–52)
PDW BLD-RTO: 14.2 % (ref 11.7–14.9)
PH BLOOD: 7.44 (ref 7.34–7.45)
PH VENOUS: 7.29 (ref 7.32–7.42)
PLATELET # BLD: 241 K/CU MM (ref 140–440)
PMV BLD AUTO: 8.5 FL (ref 7.5–11.1)
PO2 ARTERIAL: 73 MMHG (ref 75–100)
PO2, VEN: 39 MMHG (ref 28–48)
POTASSIUM SERPL-SCNC: 4.1 MMOL/L (ref 3.5–5.1)
PRO-BNP: 1212 PG/ML
RAPID INFLUENZA  B AGN: NEGATIVE
RAPID INFLUENZA A AGN: POSITIVE
RBC # BLD: 4.09 M/CU MM (ref 4.6–6.2)
SARS-COV-2, NAAT: NOT DETECTED
SEGMENTED NEUTROPHILS ABSOLUTE COUNT: 5.1 K/CU MM
SEGMENTED NEUTROPHILS RELATIVE PERCENT: 78 % (ref 36–66)
SODIUM BLD-SCNC: 130 MMOL/L (ref 135–145)
SOURCE: NORMAL
T4 FREE: 1.23 NG/DL (ref 0.9–1.8)
TOTAL IMMATURE NEUTOROPHIL: 0.06 K/CU MM
TOTAL NUCLEATED RBC: 0 K/CU MM
TOTAL PROTEIN: 7.3 GM/DL (ref 6.4–8.2)
TROPONIN T: <0.01 NG/ML
TSH HIGH SENSITIVITY: 0.78 UIU/ML (ref 0.27–4.2)
WBC # BLD: 6.5 K/CU MM (ref 4–10.5)

## 2022-11-22 PROCEDURE — 83880 ASSAY OF NATRIURETIC PEPTIDE: CPT

## 2022-11-22 PROCEDURE — 6360000004 HC RX CONTRAST MEDICATION: Performed by: INTERNAL MEDICINE

## 2022-11-22 PROCEDURE — 94640 AIRWAY INHALATION TREATMENT: CPT

## 2022-11-22 PROCEDURE — 6370000000 HC RX 637 (ALT 250 FOR IP): Performed by: NURSE PRACTITIONER

## 2022-11-22 PROCEDURE — 84439 ASSAY OF FREE THYROXINE: CPT

## 2022-11-22 PROCEDURE — 99285 EMERGENCY DEPT VISIT HI MDM: CPT

## 2022-11-22 PROCEDURE — 36600 WITHDRAWAL OF ARTERIAL BLOOD: CPT

## 2022-11-22 PROCEDURE — 82803 BLOOD GASES ANY COMBINATION: CPT

## 2022-11-22 PROCEDURE — 84484 ASSAY OF TROPONIN QUANT: CPT

## 2022-11-22 PROCEDURE — 82805 BLOOD GASES W/O2 SATURATION: CPT

## 2022-11-22 PROCEDURE — 80053 COMPREHEN METABOLIC PANEL: CPT

## 2022-11-22 PROCEDURE — 2580000003 HC RX 258: Performed by: NURSE PRACTITIONER

## 2022-11-22 PROCEDURE — 71045 X-RAY EXAM CHEST 1 VIEW: CPT

## 2022-11-22 PROCEDURE — 87804 INFLUENZA ASSAY W/OPTIC: CPT

## 2022-11-22 PROCEDURE — 2060000000 HC ICU INTERMEDIATE R&B

## 2022-11-22 PROCEDURE — 94761 N-INVAS EAR/PLS OXIMETRY MLT: CPT

## 2022-11-22 PROCEDURE — 87635 SARS-COV-2 COVID-19 AMP PRB: CPT

## 2022-11-22 PROCEDURE — 36415 COLL VENOUS BLD VENIPUNCTURE: CPT

## 2022-11-22 PROCEDURE — 6360000002 HC RX W HCPCS: Performed by: NURSE PRACTITIONER

## 2022-11-22 PROCEDURE — 2700000000 HC OXYGEN THERAPY PER DAY

## 2022-11-22 PROCEDURE — 84443 ASSAY THYROID STIM HORMONE: CPT

## 2022-11-22 PROCEDURE — 93005 ELECTROCARDIOGRAM TRACING: CPT | Performed by: EMERGENCY MEDICINE

## 2022-11-22 PROCEDURE — 85025 COMPLETE CBC W/AUTO DIFF WBC: CPT

## 2022-11-22 PROCEDURE — 93306 TTE W/DOPPLER COMPLETE: CPT

## 2022-11-22 PROCEDURE — 71260 CT THORAX DX C+: CPT | Performed by: INTERNAL MEDICINE

## 2022-11-22 RX ORDER — ACETAMINOPHEN 650 MG/1
650 SUPPOSITORY RECTAL EVERY 6 HOURS PRN
Status: DISCONTINUED | OUTPATIENT
Start: 2022-11-22 | End: 2022-11-25 | Stop reason: HOSPADM

## 2022-11-22 RX ORDER — ACETAMINOPHEN,DIPHENHYDRAMINE HCL 500; 25 MG/1; MG/1
2 TABLET, FILM COATED ORAL NIGHTLY PRN
Status: DISCONTINUED | OUTPATIENT
Start: 2022-11-22 | End: 2022-11-22 | Stop reason: CLARIF

## 2022-11-22 RX ORDER — DIPHENHYDRAMINE HCL 25 MG
50 TABLET ORAL NIGHTLY PRN
Status: DISCONTINUED | OUTPATIENT
Start: 2022-11-22 | End: 2022-11-25 | Stop reason: HOSPADM

## 2022-11-22 RX ORDER — ONDANSETRON 4 MG/1
4 TABLET, ORALLY DISINTEGRATING ORAL EVERY 8 HOURS PRN
Status: DISCONTINUED | OUTPATIENT
Start: 2022-11-22 | End: 2022-11-25 | Stop reason: HOSPADM

## 2022-11-22 RX ORDER — POLYETHYLENE GLYCOL 3350 17 G/17G
17 POWDER, FOR SOLUTION ORAL DAILY PRN
Status: DISCONTINUED | OUTPATIENT
Start: 2022-11-22 | End: 2022-11-25 | Stop reason: HOSPADM

## 2022-11-22 RX ORDER — SODIUM CHLORIDE 0.9 % (FLUSH) 0.9 %
5-40 SYRINGE (ML) INJECTION PRN
Status: DISCONTINUED | OUTPATIENT
Start: 2022-11-22 | End: 2022-11-25 | Stop reason: HOSPADM

## 2022-11-22 RX ORDER — PREDNISONE 10 MG/1
TABLET ORAL SEE ADMIN INSTRUCTIONS
COMMUNITY
Start: 2022-11-14

## 2022-11-22 RX ORDER — FUROSEMIDE 10 MG/ML
20 INJECTION INTRAMUSCULAR; INTRAVENOUS 2 TIMES DAILY
Status: DISCONTINUED | OUTPATIENT
Start: 2022-11-22 | End: 2022-11-23

## 2022-11-22 RX ORDER — ONDANSETRON 2 MG/ML
4 INJECTION INTRAMUSCULAR; INTRAVENOUS EVERY 6 HOURS PRN
Status: DISCONTINUED | OUTPATIENT
Start: 2022-11-22 | End: 2022-11-25 | Stop reason: HOSPADM

## 2022-11-22 RX ORDER — POLYETHYLENE GLYCOL 3350 17 G
2 POWDER IN PACKET (EA) ORAL
Status: DISCONTINUED | OUTPATIENT
Start: 2022-11-22 | End: 2022-11-25 | Stop reason: HOSPADM

## 2022-11-22 RX ORDER — SODIUM CHLORIDE 9 MG/ML
INJECTION, SOLUTION INTRAVENOUS PRN
Status: DISCONTINUED | OUTPATIENT
Start: 2022-11-22 | End: 2022-11-25 | Stop reason: HOSPADM

## 2022-11-22 RX ORDER — PREDNISONE 20 MG/1
40 TABLET ORAL DAILY
Status: DISCONTINUED | OUTPATIENT
Start: 2022-11-25 | End: 2022-11-25 | Stop reason: HOSPADM

## 2022-11-22 RX ORDER — ACETAMINOPHEN 500 MG
1000 TABLET ORAL NIGHTLY PRN
Status: DISCONTINUED | OUTPATIENT
Start: 2022-11-22 | End: 2022-11-25 | Stop reason: HOSPADM

## 2022-11-22 RX ORDER — PROMETHAZINE HYDROCHLORIDE 25 MG/1
12.5 TABLET ORAL EVERY 6 HOURS PRN
Status: DISCONTINUED | OUTPATIENT
Start: 2022-11-22 | End: 2022-11-22 | Stop reason: ALTCHOICE

## 2022-11-22 RX ORDER — ASPIRIN 81 MG/1
81 TABLET ORAL DAILY
Status: DISCONTINUED | OUTPATIENT
Start: 2022-11-23 | End: 2022-11-25 | Stop reason: HOSPADM

## 2022-11-22 RX ORDER — SODIUM CHLORIDE 0.9 % (FLUSH) 0.9 %
5-40 SYRINGE (ML) INJECTION EVERY 12 HOURS SCHEDULED
Status: DISCONTINUED | OUTPATIENT
Start: 2022-11-22 | End: 2022-11-25 | Stop reason: HOSPADM

## 2022-11-22 RX ORDER — TAMSULOSIN HYDROCHLORIDE 0.4 MG/1
0.4 CAPSULE ORAL DAILY
Status: DISCONTINUED | OUTPATIENT
Start: 2022-11-23 | End: 2022-11-25 | Stop reason: HOSPADM

## 2022-11-22 RX ORDER — ONDANSETRON 2 MG/ML
4 INJECTION INTRAMUSCULAR; INTRAVENOUS EVERY 6 HOURS PRN
Status: DISCONTINUED | OUTPATIENT
Start: 2022-11-22 | End: 2022-11-22 | Stop reason: SDUPTHER

## 2022-11-22 RX ORDER — PANTOPRAZOLE SODIUM 40 MG/1
40 TABLET, DELAYED RELEASE ORAL DAILY
Status: DISCONTINUED | OUTPATIENT
Start: 2022-11-23 | End: 2022-11-25 | Stop reason: HOSPADM

## 2022-11-22 RX ORDER — ENOXAPARIN SODIUM 100 MG/ML
40 INJECTION SUBCUTANEOUS DAILY
Status: DISCONTINUED | OUTPATIENT
Start: 2022-11-22 | End: 2022-11-25 | Stop reason: HOSPADM

## 2022-11-22 RX ORDER — ACETAMINOPHEN 325 MG/1
650 TABLET ORAL EVERY 6 HOURS PRN
Status: DISCONTINUED | OUTPATIENT
Start: 2022-11-22 | End: 2022-11-25 | Stop reason: HOSPADM

## 2022-11-22 RX ORDER — BUDESONIDE AND FORMOTEROL FUMARATE DIHYDRATE 160; 4.5 UG/1; UG/1
2 AEROSOL RESPIRATORY (INHALATION) 2 TIMES DAILY
Status: DISCONTINUED | OUTPATIENT
Start: 2022-11-22 | End: 2022-11-25 | Stop reason: HOSPADM

## 2022-11-22 RX ORDER — TRIAMTERENE AND HYDROCHLOROTHIAZIDE 37.5; 25 MG/1; MG/1
1 TABLET ORAL DAILY
Status: DISCONTINUED | OUTPATIENT
Start: 2022-11-22 | End: 2022-11-22

## 2022-11-22 RX ORDER — ATORVASTATIN CALCIUM 40 MG/1
40 TABLET, FILM COATED ORAL NIGHTLY
Status: DISCONTINUED | OUTPATIENT
Start: 2022-11-22 | End: 2022-11-25 | Stop reason: HOSPADM

## 2022-11-22 RX ORDER — ACETAMINOPHEN 325 MG/1
650 TABLET ORAL EVERY 6 HOURS
Status: DISCONTINUED | OUTPATIENT
Start: 2022-11-22 | End: 2022-11-25 | Stop reason: HOSPADM

## 2022-11-22 RX ORDER — METHYLPREDNISOLONE SODIUM SUCCINATE 40 MG/ML
40 INJECTION, POWDER, LYOPHILIZED, FOR SOLUTION INTRAMUSCULAR; INTRAVENOUS EVERY 6 HOURS
Status: COMPLETED | OUTPATIENT
Start: 2022-11-22 | End: 2022-11-24

## 2022-11-22 RX ORDER — NICOTINE 21 MG/24HR
1 PATCH, TRANSDERMAL 24 HOURS TRANSDERMAL DAILY
Status: DISCONTINUED | OUTPATIENT
Start: 2022-11-22 | End: 2022-11-25 | Stop reason: HOSPADM

## 2022-11-22 RX ADMIN — METHYLPREDNISOLONE SODIUM SUCCINATE 40 MG: 40 INJECTION, POWDER, FOR SOLUTION INTRAMUSCULAR; INTRAVENOUS at 19:10

## 2022-11-22 RX ADMIN — METHYLPREDNISOLONE SODIUM SUCCINATE 40 MG: 40 INJECTION, POWDER, FOR SOLUTION INTRAMUSCULAR; INTRAVENOUS at 19:09

## 2022-11-22 RX ADMIN — IOPAMIDOL 75 ML: 755 INJECTION, SOLUTION INTRAVENOUS at 17:27

## 2022-11-22 RX ADMIN — FUROSEMIDE 20 MG: 10 INJECTION, SOLUTION INTRAVENOUS at 19:11

## 2022-11-22 RX ADMIN — ACETAMINOPHEN 650 MG: 325 TABLET ORAL at 19:03

## 2022-11-22 RX ADMIN — ATORVASTATIN CALCIUM 40 MG: 40 TABLET, FILM COATED ORAL at 19:02

## 2022-11-22 RX ADMIN — METOPROLOL TARTRATE 25 MG: 25 TABLET, FILM COATED ORAL at 20:20

## 2022-11-22 RX ADMIN — BUDESONIDE AND FORMOTEROL FUMARATE DIHYDRATE 2 PUFF: 160; 4.5 AEROSOL RESPIRATORY (INHALATION) at 19:30

## 2022-11-22 RX ADMIN — SODIUM CHLORIDE, PRESERVATIVE FREE 10 ML: 5 INJECTION INTRAVENOUS at 20:21

## 2022-11-22 RX ADMIN — ENOXAPARIN SODIUM 40 MG: 100 INJECTION SUBCUTANEOUS at 19:08

## 2022-11-22 ASSESSMENT — ENCOUNTER SYMPTOMS
DIARRHEA: 1
CONSTIPATION: 0
COUGH: 1
EYES NEGATIVE: 1
SORE THROAT: 0
WHEEZING: 1
VOMITING: 0
NAUSEA: 1
ABDOMINAL PAIN: 0
SHORTNESS OF BREATH: 1

## 2022-11-22 ASSESSMENT — PAIN DESCRIPTION - LOCATION: LOCATION: BACK

## 2022-11-22 ASSESSMENT — PAIN SCALES - GENERAL: PAINLEVEL_OUTOF10: 5

## 2022-11-22 ASSESSMENT — PAIN DESCRIPTION - ORIENTATION: ORIENTATION: LOWER

## 2022-11-22 ASSESSMENT — PAIN DESCRIPTION - DESCRIPTORS: DESCRIPTORS: ACHING

## 2022-11-22 ASSESSMENT — PAIN - FUNCTIONAL ASSESSMENT: PAIN_FUNCTIONAL_ASSESSMENT: NONE - DENIES PAIN

## 2022-11-22 NOTE — CONSULTS
Will dictate full note  Patient known to practice     Dictated  Hx of cath 2009=[facility] negative  Echo 9/22-normal EF  Stress test 9/22-deborah EF   Hx of COPD  He had allergic reaction to maxzide, had rash al over --it was stopped by her dermatologist and rash resolved    Will follow  Hx of COPD   ABG respiratory acidosis noted may need BIPAP      Electronically signed by Jannet Price MD on 11/22/22 at 4:23 PM EST

## 2022-11-22 NOTE — ED NOTES
Pts NC titrated to 3L via nc and pt remains at 100%. Pt does feel some relief. EKG completed. [place], lactic and blood gas completed.       Aria Hargrove RN  11/22/22 [id number]

## 2022-11-22 NOTE — ED NOTES
Medication History  [facility]    Patient Name: Mata Alanis 1950     Medication history has been completed by: Lefty Haro CPhT    Source(s) of information: patient and insurance claims     Primary Care Physician: Ingrid Flynn MD     Pharmacy: [facility]    Allergies as of 11/22/2022 - Fully Reviewed 11/22/2022   Allergen Reaction Noted    Lisinopril Other (See Comments) 04/20/2012        Prior to Admission medications    Medication Sig Start Date End Date Taking? Authorizing Provider   metoprolol tartrate (LOPRESSOR) 25 MG tablet Take 25 mg by mouth 2 times daily   Yes Historical Provider, MD   predniSONE (DELTASONE) 10 MG tablet Take by mouth See Admin Instructions 11/21/22 30 mg through 11/27/22 11/28/22 20 mg through 12/04/22 12/05/22 10 mg through 12/11/22 11/14/22   Historical Provider, MD   fluticasone-salmeterol (ADVAIR) 250-50 MCG/DOSE AEPB Inhale 1 puff into the lungs every 12 hours    Historical Provider, MD   terbinafine (LAMISIL) 1 % cream Apply topically 2 times daily.  12/9/21   Vashti Bush MD   diphenhydrAMINE-APAP, sleep, (TYLENOL PM EXTRA STRENGTH)  MG tablet Take 2 tablets by mouth nightly as needed for Sleep    Historical Provider, MD   tamsulosin (FLOMAX) 0.4 MG capsule Take 0.4 mg by mouth daily    Historical Provider, MD   pantoprazole (PROTONIX) 40 MG tablet TAKE 1 TABLET BY MOUTH DAILY 1/7/20   Ingrid Flynn MD   SPIRIVA HANDIHALER 18 MCG inhalation capsule INHALE THE CONTENTS OF 1 CAPSULE INTO THE LUNGS DAILY 8/26/19   Filomena Buck MD   atorvastatin (LIPITOR) 40 MG tablet Take 1 tablet by mouth daily  Patient taking differently: Take 40 mg by mouth nightly 8/3/16   Ingrid Flynn MD   aspirin 81 MG EC tablet Take 1 tablet by mouth daily 8/3/16   Ingrid Flynn MD   triamterene-hydrochlorothiazide [facility]) 37.5-25 MG per tablet Take 1 tablet by mouth daily 7/23/15   Inrgid Flynn MD   acetaminophen (TYLENOL) 500 MG tablet Take 1,000 mg by mouth every 6 hours as needed    Historical Provider, MD     Medications added or changed (ex. new medication, dosage change, interval change, formulation change):  Prednisone titrate dosing added  Acetaminophen dosage change from 650 mg to 1000 mg every 6 hours (patient states he takes this routine  Metoprolol tartrate dosage clarified. Comments:  Medication list reviewed with patient and insurance claims verified. Patient states he gets some of his medications from the [facility]. Patient reports he has taken first dose of medications today. Prednisone therapy on titrate dosing, dosage listed.     To my knowledge the above medication history is accurate as of 11/22/2022 1:10 PM.   Dandy Aguirre CPhT   11/22/2022 1:10 PM

## 2022-11-22 NOTE — ED NOTES
ED TO INPATIENT SBAR HANDOFF    Patient Name: Elizabeth Acosta   :  1950  66 y.o. MRN:  [id number]  Preferred Name    ED Room #:  02/02TR-02  Family/Caregiver Present yes   Restraints no   Sitter no   Sepsis Risk Score Sepsis Risk Score: 1.48    Situation  Code Status: Prior No additional code details. Allergies: Lisinopril  Weight: Patient Vitals for the past 96 hrs (Last 3 readings):   Weight   22 1306 191 lb (86.6 kg)     Arrived from: home  Chief Complaint:   Chief Complaint   Patient presents with    Shortness of Breath     Started yesterday. Was taken off his \"water pill\" yesterday. Cough     Hospital Problem/Diagnosis:  Principal Problem:    Acute respiratory failure with hypoxia (HCC)  Resolved Problems:    * No resolved hospital problems. *    Imaging:   XR CHEST PORTABLE   Final Result   1. Small right pleural effusion.            Abnormal labs:   Abnormal Labs Reviewed   RAPID INFLUENZA A/B ANTIGENS - Abnormal; Notable for the following components:       Result Value    Rapid Influenza A Ag POSITIVE (*)     All other components within normal limits   CBC WITH AUTO DIFFERENTIAL - Abnormal; Notable for the following components:    RBC 4.09 (*)     .4 (*)     MCH 34.5 (*)     Segs Relative 78.0 (*)     Lymphocytes % 7.7 (*)     Monocytes % 11.8 (*)     Immature Neutrophil % 0.9 (*)     All other components within normal limits   COMPREHENSIVE METABOLIC PANEL - Abnormal; Notable for the following components:    Sodium 130 (*)     Chloride 90 (*)     All other components within normal limits   BRAIN NATRIURETIC PEPTIDE - Abnormal; Notable for the following components:    Pro-BNP 1,212 (*)     All other components within normal limits   BLOOD GAS, VENOUS - Abnormal; Notable for the following components:    pH, Gianluca 7.29 (*)     pCO2, Gianluca 71 (*)     Base Exc, Mixed 5.1 (*)     HCO3, Venous 34.1 (*)     All other components within normal limits     Critical values: no     Abnormal Assessment Findings: shortness of breath, O2 at 84% on room air, nasal canula placed at 5L, O2 to 100%, O2 titrated down to 3L with sats at 98%-94%, Positive for Flu A    Background  History:   Past Medical History:   Diagnosis Date    CAD (coronary artery disease)     Sees Dr. Sujata Mckay [facility])     bladder, prostate and right kidney    Carotid stenosis     Chronic back pain     low back    COPD (chronic obstructive pulmonary disease) (HCC)     Erectile dysfunction     Fatigue     Hematuria, gross     Bladder tumor surgery scheduled for 6/30/14.     History of external beam radiation therapy 2017    Prostate 6,600 cGy per  [facility]    Hyperlipidemia     Hypertension     MI (mitral incompetence)     MI (myocardial infarction) ([facility] 2010    Osteoarthritis     low back    Peyronie's disease     Pneumothorax 2002    hemothorax - s/p fall - had chest tube    Retinal detachment     left    Sciatica        Assessment    Vitals/MEWS:    Level of Consciousness: Alert (0)   Vitals:    11/22/22 1200 11/22/22 1233 11/22/22 1300 11/22/22 1306   BP: 113/66 119/67 132/78    Pulse: 66 64 73    Resp: (!) 31 27 29    Temp:       TempSrc:       SpO2: 98% 97% 94%    Weight:    191 lb (86.6 kg)     FiO2 (%): increased work of breathing  O2 Flow Rate: O2 Device: Heated high flow cannula O2 Flow Rate (L/min): 60 L/min  Cardiac Rhythm:    Pain Assessment:  [] Verbal [] Meredeth [place] Scale  Pain Scale: Pain Assessment  Pain Assessment: None - Denies Pain  Last documented pain score (0-10 scale)    Last documented pain medication administered:   Mental Status: oriented, alert, coherent, logical, thought processes intact, and able to concentrate and follow conversation  NIH Score:    C-SSRS: Risk of Suicide: No Risk  Bedside swallow:    Nicolette Coma Scale (GCS): [place] Coma Scale  Eye Opening: Spontaneous  Best Verbal Response: Oriented  Best Motor Response: Obeys commands  Nicolette Coma Scale Score: 15  Active LDA's:   Peripheral IV 01/24/17 Left Antecubital (Active)     PO Status: Nothing by Mouth  Pertinent or High Risk Medications/Drips: no   If Yes, please provide details:   Pending Blood Product Administration: no     You may also review the ED PT Care Timeline found under the Summary Nursing Index tab. Recommendation    Pending orders   Plan for Discharge (if known):    Additional Comments:    If any further questions, please call Sending RN at [phone]    Electronically signed by: Electronically signed by Yanni Abarca RN on 11/22/2022 at 1:38 PM      Yanni Abarca RN  11/22/22 6519

## 2022-11-22 NOTE — PROGRESS NOTES
Latest Reference Range & Units 11/22/22 11:45   pH, Gianluca 7.32 - 7.42  7.29 (L)   pCO2, Gianluca 38 - 52 mmHG 71 (H)   pO2, Gianluca 28 - 48 mmHG 39   HCO3, Venous 19 - 25 MMOL/L 34.1 (H)   O2 Sat, Gianluca 50 - 70 % 60.9   Base Exc, Mixed 0 - 1.2  5.1 (H)   (L): Data is abnormally low  (H): Data is abnormally high

## 2022-11-22 NOTE — H&P
V2.0  History and Physical      Name:  Nadeem Monson /Age/Sex: 1950  (66 y.o. male)   MRN & CSN:  [id number] & [id number] Encounter Date/Time: 2022 2:17 PM EST   Location:  -A PCP: Reina Johns MD       Hospital Day: 1    Assessment and Plan:   Nadeem Monson is a 66 y.o. male with a pmh of COPD who presents with Acute respiratory failure with hypoxia [facility]    Hospital Problems             Last Modified POA    * (Principal) Acute respiratory failure with hypoxia ([facility] [address] 2022 Yes       Influenza A  Acute respiratory failure with hypoxia and hypercapnia  COPD exacerbation   Admit to inpatient, intermediate care   Patient on heated, high flow nasal cannula, Vapotherm currently 60% FiO2,   Last VBG pH 7.29, PCO2 71, PO2 39, HCO3 34, prior to patient being placed on Vapotherm   Repeat ABG ordered for better assessment of patient's oxygenation   Patient tested positive for influenza A, negative for COVID-19 patient did not receive influenza vaccination this year   Patient endorses chills at home,, afebrile, no evidence of tachycardia, check urine for strep Legionella   -Cough nonproductive with clear sputum    -Continue home Spiriva, Symbicort   -Start methylprednisone IV every 6 hours with prednisone taper   -Schedule DuoNeb treatments   Chest x-ray reviewed, small right pleural effusion, diuresis as noted below, no evidence for pneumonia,   -Guaifenesin cough syrup       CHF  exacerbation, unspecified  Coronary artery disease  Essential hypertension  Hyperlipidemia    2/2 fluid overload/acutely decompensated  NT Pro BNP elevated,   No prior echocardiogram available for review, echo ordered, pending  Troponin <0.01 , trend          Telemetry monitoring        Consult cardiology, Follows with Dr. Alexandria Samuels         Lasix 20 mg IV bid         Strict I&Os, Daily weights, Chem 7 qam   Consult to dietician for diet education, 2 GM NA 2 L FR diet        Hold home Maxide, monitor diuresis closely   Exacerbation likely secondary to underlying influenza A plus steroid use with recent rash   Continue metoprolol, aspirin, statin      CKD stage III  Bladder tumor excision  Prostatectomy  Status post right total nephrectomy 2018   On Flomax, creatinine 1.2, monitor with diuresis      Tobacco abuse disorder  Nicotine patch, lozenges as needed,  Tobacco cessation advised     Disposition:   Current Living situation: With wife    Expected Disposition: Likely same  Estimated D/C: 4 days    Diet ADULT DIET; Regular; Low Sodium (2 gm); 2000 ml   DVT Prophylaxis [x] Lovenox, []  Heparin, [] SCDs, [] Ambulation,  [] Eliquis, [] Xarelto, [] Coumadin   Code Status Full Code   Surrogate Decision Maker/ POA      History from:     patient, spouse, electronic medical record    History of Present Illness:     Chief Complaint: Shortness of breath, productive cough, dyspnea on exertion  Elizabeth Acosta is a 66 y.o. male with pmh of, not on home O2 who presents with   Complaints of increasing dyspnea, shortness of breath over the last week. Patient states he has been on prednisone for the last 20 days. Was on a 14-day course for rash then dermatology paced at the [place] placed him on a 4-week course, he is on day 8 of that. Rash is a lot better. Status [facility] off [facility] medications to see what caused this rash. Says the water pill usually stopped yesterday. His wife developed a cough, URI symptoms over the weekend. She did get an influenza shot, did not test for influenza but thinks she might of had it. Patient developed symptoms at the same time and have progressively worsened. States he has a chronic cough, nonproductive. Endorses fevers. States he is up 10 pounds. Denies any chest pain or flank pain. Still not urinating. Denies vomiting. Endorses 1 episode of diarrhea.   Additional review of symptoms as noted below     Review of Systems: Need 10 Elements   Review of Systems Constitutional:  Positive for activity change, fever and unexpected weight change (up 10 lbs from baseline). HENT:  Positive for congestion. Negative for sore throat. Eyes: Negative. Respiratory:  Positive for cough, shortness of breath and wheezing. Cardiovascular:  Negative for chest pain and leg swelling. Gastrointestinal:  Positive for diarrhea and nausea. Negative for abdominal pain, constipation and vomiting. Endocrine: Negative. Genitourinary:  Negative for dysuria and hematuria. Musculoskeletal:  Negative for neck pain. Skin:  Negative for wound. Neurological:  Negative for dizziness and light-headedness. All other systems reviewed and are negative. Objective:   No intake or output data in the 24 hours ending 11/22/22 1518   Vitals:   Vitals:    11/22/22 1300 11/22/22 1306 11/22/22 1330 11/22/22 1424   BP: 132/78  118/68 (!) 123/98   Pulse: 73  69 86   Resp: 29  19 17   Temp:    98.8 °F (37.1 °C)   TempSrc:    Oral   SpO2: 94%  92% 96%   Weight:  191 lb (86.6 kg)         Medications Prior to Admission     Prior to Admission medications    Medication Sig Start Date End Date Taking? Authorizing Provider   metoprolol tartrate (LOPRESSOR) 25 MG tablet Take 25 mg by mouth 2 times daily   Yes Historical Provider, MD   predniSONE (DELTASONE) 10 MG tablet Take by mouth See Admin Instructions 11/21/22 30 mg through 11/27/22 11/28/22 20 mg through 12/04/22 12/05/22 10 mg through 12/11/22 11/14/22   Historical Provider, MD   fluticasone-salmeterol (ADVAIR) 250-50 MCG/DOSE AEPB Inhale 1 puff into the lungs every 12 hours    Historical Provider, MD   terbinafine (LAMISIL) 1 % cream Apply topically 2 times daily.  12/9/21   Lillie Hurley MD   diphenhydrAMINE-APAP, sleep, (TYLENOL PM EXTRA STRENGTH)  MG tablet Take 2 tablets by mouth nightly as needed for Sleep    Historical Provider, MD   tamsulosin (FLOMAX) 0.4 MG capsule Take 0.4 mg by mouth daily    Historical Provider, MD pantoprazole (PROTONIX) 40 MG tablet TAKE 1 TABLET BY MOUTH DAILY 1/7/20   Praveen Ray MD   SPIRIVA HANDIHALER 18 MCG inhalation capsule INHALE THE CONTENTS OF 1 CAPSULE INTO THE LUNGS DAILY 8/26/19   Serene Kaye MD   atorvastatin (LIPITOR) 40 MG tablet Take 1 tablet by mouth daily  Patient taking differently: Take 40 mg by mouth nightly 8/3/16   Praveen Ray MD   aspirin 81 MG EC tablet Take 1 tablet by mouth daily 8/3/16   Praveen Ray MD   triamterene-hydrochlorothiazide [facility]) 37.5-25 MG per tablet Take 1 tablet by mouth daily 7/23/15   Praveen Ray MD   acetaminophen (TYLENOL) 500 MG tablet Take 1,000 mg by mouth in the morning and 1,000 mg at noon and 1,000 mg in the evening and 1,000 mg before bedtime. Sharron Garsia Historical Provider, MD       Physical Exam: Need 8 Elements   Physical Exam  Vitals and nursing note reviewed. Constitutional:       General: He is not in acute distress. Appearance: Normal appearance. He is ill-appearing. He is not diaphoretic. Comments: Wearing vapotherm    HENT:      Head: Normocephalic. Nose: Nose normal.      Mouth/Throat:      Mouth: Mucous membranes are moist.      Pharynx: Oropharynx is clear. Eyes:      Pupils: Pupils are equal, round, and reactive to light. Neck:      Vascular: JVD present. Cardiovascular:      Rate and Rhythm: Normal rate and regular rhythm. Pulses: Normal pulses. Pulmonary:      Effort: Pulmonary effort is normal. No respiratory distress. Breath sounds: Wheezing and rhonchi present. Abdominal:      General: Abdomen is flat. Bowel sounds are normal. There is no distension. Tenderness: There is no abdominal tenderness. Musculoskeletal:         General: Normal range of motion. Cervical back: Normal range of motion. Skin:     General: Skin is warm and dry. Capillary Refill: Capillary refill takes less than 2 seconds. Neurological:      General: No focal deficit present. Mental Status: He is alert and oriented to person, place, and time. Psychiatric:         Mood and Affect: Mood normal.        Past Medical History:   PMHx   Past Medical History:   Diagnosis Date    CAD (coronary artery disease)     Sees Dr. Maame Cuba [facility])     bladder, prostate and right kidney    Carotid stenosis     Chronic back pain     low back    COPD [facility]chronic obstructive pulmonary disease) ([facility] [address]     Erectile dysfunction     Fatigue     Hematuria, gross     Bladder tumor surgery scheduled for 6/30/14.  History of external beam radiation therapy 2017    Prostate 6,600 cGy [facility]    Hyperlipidemia     Hypertension     MI (mitral incompetence)     MI (myocardial infarction) ([facility] [address] 2010    Osteoarthritis     low back    Peyronie's disease     Pneumothorax 2002    hemothorax - s/p fall - had chest tube    Retinal detachment     left    Sciatica      PSHX:  has a past surgical history that includes Appendectomy (3380); Tonsillectomy and adenoidectomy (childhood); cardiovascular stress test (6/2011 & 6/23/2014); eye surgery (2011); eye surgery (2004); bladder tumor excision (06/30/14); Colonoscopy (10/16/2014); nasal hemorrhage control (1991); Prostatectomy (N/A, 12/10/2015); Incisional hernia repair (N/A, 12/10/2015); Umbilical hernia repair (N/A, 12/10/2015); and total nephrectomy (Right, 2018). Allergies: Allergies   Allergen Reactions    Lisinopril Other (See Comments)     Cough     Fam HX:  family history includes Cancer in his brother and mother; Depression in his brother; High Blood Pressure in his brother and father; Other in his father; Prostate Cancer in his brother; Substance Abuse in his brother.   Soc HX:   Social History     Socioeconomic History    Marital status:    Occupational History    Occupation: [employer]   Tobacco Use    Smoking status: Every Day     Packs/day: 2.00     Years: 40.00     Pack years: 80.00     Types: Cigarettes    Smokeless tobacco: Current   Substance and Sexual Activity    Alcohol use:  Yes     Alcohol/week: 0.0 standard drinks     Comment: 2 vodka martinis daily      CAFFEINE: 2 cups coffee & can pop daily    Drug use: No       Medications:   Medications:    acetaminophen  650 mg Oral Q6H    [START ON 11/23/2022] aspirin  81 mg Oral Daily    atorvastatin  40 mg Oral Nightly    budesonide-formoterol  2 puff Inhalation BID    metoprolol tartrate  25 mg Oral BID    [START ON 11/23/2022] pantoprazole  40 mg Oral Daily    [START ON 11/23/2022] tiotropium  2 puff Inhalation Daily    [START ON 11/23/2022] tamsulosin  0.4 mg Oral Daily    [Held by provider] triamterene-hydroCHLOROthiazide  1 tablet Oral Daily    sodium chloride flush  5-40 mL IntraVENous 2 times per day    enoxaparin  40 mg SubCUTAneous Daily    nicotine  1 patch TransDERmal Daily    methylPREDNISolone  40 mg IntraVENous Q6H    Followed by   Omayra Vela ON 11/25/2022] predniSONE  40 mg Oral Daily    furosemide  20 mg IntraVENous BID      Infusions:    sodium chloride       PRN Meds: sodium chloride flush, 5-40 mL, PRN  sodium chloride, , PRN  ondansetron, 4 mg, Q8H PRN   Or  ondansetron, 4 mg, Q6H PRN  polyethylene glycol, 17 g, Daily PRN  acetaminophen, 650 mg, Q6H PRN   Or  acetaminophen, 650 mg, Q6H PRN  nicotine polacrilex, 2 mg, Q1H PRN  acetaminophen, 1,000 mg, Nightly PRN   And  diphenhydramine, 50 mg, Nightly PRN      Labs      CBC:   Recent Labs     11/22/22  1128   WBC 6.5   HGB 14.1        BMP:    Recent Labs     11/22/22  1128   *   K 4.1   CL 90*   CO2 28   BUN 15   CREATININE 1.2   GLUCOSE 94     Hepatic:   Recent Labs     11/22/22  1128   AST 22   ALT 19   BILITOT 0.7   ALKPHOS 121     Lipids:   Lab Results   Component Value Date/Time    CHOL 171 06/09/2014 11:45 AM    HDL 45 06/09/2014 11:45 AM    TRIG 127 06/09/2014 11:45 AM     Hemoglobin A1C:   Lab Results   Component Value Date/Time    LABA1C 4.4 12/09/2021 03:28 PM TSH: No results found for: TSH  Troponin:   Lab Results   Component Value Date/Time    TROPONINT <0.010 11/22/2022 11:28 AM     Lactic Acid: No results for input(s): LACTA in the last 72 hours. BNP:   Recent Labs     11/22/22  1128   PROBNP 1,212*     UA:  Lab Results   Component Value Date/Time    NITRU NEGATIVE 03/17/2022 01:47 PM    NITRU Positive 03/28/2018 12:00 AM    NITRU NEGATIVE 08/12/2011 02:00 PM    COLORU YELLOW 03/17/2022 01:47 PM    PHUR 6.5 03/28/2018 12:00 AM    WBCUA 132 03/17/2022 01:47 PM    RBCUA NONE SEEN 03/17/2022 01:47 PM    MUCUS FEW 03/22/2012 05:25 PM    TRICHOMONAS NONE SEEN 03/17/2022 01:47 PM    BACTERIA FEW 03/17/2022 01:47 PM    CLARITYU SLIGHTLY CLOUDY 03/17/2022 01:47 PM    SPECGRAV 1.010 03/17/2022 01:47 PM    LEUKOCYTESUR NEGATIVE 03/17/2022 01:47 PM    UROBILINOGEN 0.2 03/17/2022 01:47 PM    BILIRUBINUR NEGATIVE 03/17/2022 01:47 PM    BILIRUBINUR negative 06/09/2014 11:45 AM    BLOODU SMALL 03/17/2022 01:47 PM    GLUCOSEU Negative 03/28/2018 12:00 AM    KETUA NEGATIVE 03/17/2022 01:47 PM     Urine Cultures: No results found for: LABURIN  Blood Cultures: No results found for: BC  No results found for: BLOODCULT2  Organism: No results found for: ORG    Imaging/Diagnostics Last 24 Hours   XR CHEST PORTABLE    Result Date: 11/22/2022  EXAMINATION: ONE XRAY VIEW OF THE CHEST 11/22/2022 12:00 pm COMPARISON: 12/11/2015. HISTORY: ORDERING SYSTEM PROVIDED HISTORY: SOB TECHNOLOGIST PROVIDED HISTORY: Reason for exam:->SOB Reason for Exam: SOB FINDINGS: The heart size is enlarged but unchanged. The pulmonary vasculature is within normal limits. There is blunting of the right costophrenic angle. There are old healed right rib fractures. No pneumothoraces are seen. 1. Small right pleural effusion.        Personally reviewed Lab Studies, Imaging, and discussed case with Dr Jamie Montgomery    Electronically signed by TERESITA Yo CNP on 11/22/2022 at 3:18 PM

## 2022-11-22 NOTE — ED PROVIDER NOTES
Emergency Department Encounter    Patient: Taina Raza  MRN: [id number]  : 1950  Date of Evaluation: 2022  ED Provider:  Arabella Mckoy MD    Triage Chief Complaint:   Shortness of Breath (Started yesterday. Was taken off his \"water pill\" yesterday. /) and Cough    [place]:  Taina Raza is a 66 y.o. male that presents with complaint of shortness of breath. He had started getting a rash on legs and back, stopped his water pill, has been on prednisone for twenty days- was on a 14 day course, then placed on a 4 week course, on day 8 of that one. Rash is a lot better. No CP. Worsening shortness of breath. They were trying taking him off different medications to see what the cause of this rash was. Says the water pill he just stopped 1 week ago. Not on oxygen at home. He is a smoker, has chronic cough, not sure if worse. No fevers. He is up ten pounds. No CP or Flank pain. Still urinating. No vomiting. He has had some congestion the last few days and wife has been sick this week as well. ROS - see HPI, below listed is current ROS at time of my eval:  10 systems reviewed and negative except as above. Past Medical History:   Diagnosis Date    CAD (coronary artery disease)     Sees Dr. Martino [facility])     bladder, prostate and right kidney    Carotid stenosis     Chronic back pain     low back    COPD (chronic obstructive pulmonary disease) (HCC)     Erectile dysfunction     Fatigue     Hematuria, gross     Bladder tumor surgery scheduled for 14.     History of external beam radiation therapy 2017    Prostate 6,600 cGy per  at [facility]    Hyperlipidemia     Hypertension     MI (mitral incompetence)     MI (myocardial infarction) ([facility] [address] 2010    Osteoarthritis     low back    Peyronie's disease     Pneumothorax 2002    hemothorax - s/p fall - had chest tube    Retinal detachment     left    Sciatica      Past Surgical History:   Procedure Laterality Date    APPENDECTOMY   BLADDER TUMOR EXCISION  06/30/14    TURB w r stent placement    CARDIOVASCULAR STRESS TEST  6/2011 & 6/23/2014    COLONOSCOPY  10/16/2014    polyps times seven, diverticulosis    EYE SURGERY  2011    cataract removal    EYE SURGERY  2004    retinal detachment    INCISIONAL HERNIA REPAIR N/A 12/10/2015    NASAL HEMORRHAGE CONTROL  1991    PROSTATECTOMY N/A 12/10/2015    robotic assisted laporascopic    TONSILLECTOMY AND ADENOIDECTOMY  childhood    TOTAL NEPHRECTOMY Right 2018    at [address]  N/A 12/10/2015     Family History   Problem Relation Age of Onset    Cancer Mother         colon, kidney, liver    Prostate Cancer Brother     Depression Brother     High Blood Pressure Brother     Other Father         AAA    High Blood Pressure Father         possible    Substance Abuse Brother         alcohol, tobacco    Cancer Brother         prostate     Social History     Socioeconomic History    Marital status:      Spouse name: Not on file    Number of children: Not on file    Years of education: Not on file    Highest education level: Not on file   Occupational History    Occupation: [employer]   Tobacco Use    Smoking status: Every Day     Packs/day: 2.00     Years: 40.00     Pack years: 80.00     Types: Cigarettes    Smokeless tobacco: Current   Substance and Sexual Activity    Alcohol use:  Yes     Alcohol/week: 0.0 standard drinks     Comment: 2 vodka martinis daily      CAFFEINE: 2 cups coffee & can pop daily    Drug use: No    Sexual activity: Not on file   Other Topics Concern    Not on file   Social History Narrative    Not on file     Social Determinants of Health     Financial Resource Strain: Not on file   Food Insecurity: Not on file   Transportation Needs: Not on file   Physical Activity: Not on file   Stress: Not on file   Social Connections: Not on file   Intimate Partner Violence: Not on file   Housing Stability: Not on file     Current Facility-Administered Medications Medication Dose Route Frequency Provider Last Rate Last Admin    acetaminophen (TYLENOL) tablet 650 mg  650 mg Oral Q6H TERESITA Browne CNP   650 mg at 11/22/22 1903    [START ON 11/23/2022] aspirin EC tablet 81 mg  81 mg Oral Daily TERESITA Browne CNP        atorvastatin (LIPITOR) tablet 40 mg  40 mg Oral Nightly TERESITA Browne CNP   40 mg at 11/22/22 1902    budesonide-formoterol (SYMBICORT) 160-4.5 MCG/ACT inhaler 2 puff  2 puff Inhalation BID TERESITA Shin CNP   2 puff at 11/22/22 1930    metoprolol tartrate (LOPRESSOR) tablet 25 mg  25 mg Oral BID TERESITA Shin CNP   25 mg at 11/22/22 2020    [START ON 11/23/2022] pantoprazole (PROTONIX) tablet 40 mg  40 mg Oral Daily TERESITA Browne CNP        [START ON 11/23/2022] tiotropium (SPIRIVA RESPIMAT) 2.5 MCG/ACT inhaler 2 puff  2 puff Inhalation Daily TERESITA Browne CNP        [START ON 11/23/2022] tamsulosin (FLOMAX) capsule 0.4 mg  0.4 mg Oral Daily TERESITA Browne CNP        sodium chloride flush 0.9 % injection 5-40 mL  5-40 mL IntraVENous 2 times per day TERESITA Shin CNP   10 mL at 11/22/22 2021    sodium chloride flush 0.9 % injection 5-40 mL  5-40 mL IntraVENous PRN TERESITA Browne CNP        0.9 % sodium chloride infusion   IntraVENous PRN TERESITA Browne CNP        ondansetron (ZOFRAN-ODT) disintegrating tablet 4 mg  4 mg Oral Q8H PRN TERESITA Browne CNP        Or    ondansetron (ZOFRAN) injection 4 mg  4 mg IntraVENous Q6H PRN TERESITA Browne CNP        polyethylene glycol (GLYCOLAX) packet 17 g  17 g Oral Daily PRN TERESITA Browne CNP        acetaminophen (TYLENOL) tablet 650 mg  650 mg Oral Q6H PRN TERESITA Browne CNP        Or    acetaminophen (TYLENOL) suppository 650 mg  650 mg Rectal Q6H PRN TERESITA Shin CNP        enoxaparin (LOVENOX) injection 40 mg  40 mg SubCUTAneous Daily TERESITA Browne CNP   40 mg at 11/22/22 1908    nicotine (NICODERM CQ) 21 MG/24HR 1 patch  1 patch TransDERmal Daily TERESITA Browne CNP   1 patch at 11/22/22 1902    nicotine polacrilex (COMMIT) lozenge 2 mg  2 mg Oral Q1H PRN TERESITA Browne CNP        methylPREDNISolone sodium (SOLU-MEDROL) injection 40 mg  40 mg IntraVENous Q6H TERESITA Browne CNP   40 mg at 11/22/22 1910    Followed by    Soham Serrato ON 11/25/2022] predniSONE (DELTASONE) tablet 40 mg  40 mg Oral Daily TERESITA Browne CNP        furosemide (LASIX) injection 20 mg  20 mg IntraVENous BID TERESITA Browne CNP   20 mg at 11/22/22 1911    acetaminophen (TYLENOL) tablet 1,000 mg  1,000 mg Oral Nightly PRN TERESITA Browne CNP        And    diphenhydrAMINE (BENADRYL) tablet 50 mg  50 mg Oral Nightly PRN TERESITA Hanna CNP         Allergies   Allergen Reactions    Lisinopril Other (See Comments)     Cough    Triamterene-Hydrochlorothiazide [Hydrochlorothiazide W-Triamterene] Rash       Nursing Notes Reviewed    Physical Exam:  Triage VS:    ED Triage Vitals [11/22/22 1124]   Enc Vitals Group      BP (!) 152/78      Heart Rate 71      Resp 19      Temp       Temp Source Oral      SpO2 100 %      Weight       Height       Head Circumference       Peak Flow       Pain Score       Pain Loc       Pain Edu? Excl. in [address]? My pulse ox interpretation is - abnormal, 70s on arrival, placed on nasal cannula and then nonrebreather. General appearance:  No acute distress. Skin:  Warm. Dry. Eye:  Extraocular movements intact. Ears, nose, mouth and throat:  Oral mucosa moist   Neck:  Trachea midline. Extremity: 2+ pitting edema normal ROM     Heart:  Regular rate and rhythm, normal S1 & S2, no extra heart sounds.     Perfusion:  intact  Respiratory: Increased work of breathing, requiring nonrebreather, intercostal muscle use, tachypnea. Some expiratory wheeze, diminished at bases. Abdominal: Soft. Nontender. Non distended.   Neurological:  Alert and oriented           Psychiatric:  Appropriate    I have reviewed and interpreted all of the currently available lab results from this visit (if applicable):  Results for orders placed or performed during the hospital encounter of 11/22/22   COVID-19, Rapid    Specimen: Nasopharyngeal   Result Value Ref Range    Source UNKNOWN     SARS-CoV-2, NAAT NOT DETECTED NOT DETECTED   Rapid Flu Swab    Specimen: Nasopharyngeal   Result Value Ref Range    Rapid Influenza A Ag POSITIVE (A) NEGATIVE    Rapid Influenza B Ag NEGATIVE NEGATIVE   CBC with Auto Differential   Result Value Ref Range    WBC 6.5 4.0 - 10.5 K/CU MM    RBC 4.09 (L) 4.6 - 6.2 M/CU MM    Hemoglobin 14.1 13.5 - 18.0 GM/DL    Hematocrit 42.7 42 - 52 %    .4 (H) 78 - 100 FL    MCH 34.5 (H) 27 - 31 PG    MCHC 33.0 32.0 - 36.0 %    RDW 14.2 11.7 - 14.9 %    Platelets 435 356 - 515 K/CU MM    MPV 8.5 7.5 - 11.1 FL    Differential Type AUTOMATED DIFFERENTIAL     Segs Relative 78.0 (H) 36 - 66 %    Lymphocytes % 7.7 (L) 24 - 44 %    Monocytes % 11.8 (H) 0 - 4 %    Eosinophils % 1.1 0 - 3 %    Basophils % 0.5 0 - 1 %    Segs Absolute 5.1 K/CU MM    Lymphocytes Absolute 0.5 K/CU MM    Monocytes Absolute 0.8 K/CU MM    Eosinophils Absolute 0.1 K/CU MM    Basophils Absolute 0.0 K/CU MM    Nucleated RBC % 0.0 %    Total Nucleated RBC 0.0 K/CU MM    Total Immature Neutrophil 0.06 K/CU MM    Immature Neutrophil % 0.9 (H) 0 - 0.43 %   Comprehensive Metabolic Panel   Result Value Ref Range    Sodium 130 (L) 135 - 145 MMOL/L    Potassium 4.1 3.5 - 5.1 MMOL/L    Chloride 90 (L) 99 - 110 mMol/L    CO2 28 21 - 32 MMOL/L    BUN 15 6 - 23 MG/DL    Creatinine 1.2 0.9 - 1.3 MG/DL    Est, Glom Filt Rate >60 >60 mL/min/1.73m2    Glucose 94 70 - 99 MG/DL    Calcium 9.7 8.3 - 10.6 MG/DL    Albumin 4.3 3.4 - 5.0 GM/DL    Total Protein 7.3 6.4 - 8.2 GM/DL    Total Bilirubin 0.7 0.0 - 1.0 MG/DL    ALT 19 10 - 40 U/L    AST 22 15 - 37 IU/L    Alkaline Phosphatase 121 40 - 129 IU/L    Anion Gap 12 4 - 16   Troponin   Result Value Ref Range    Troponin T <0.010 <0.01 NG/ML   Brain Natriuretic Peptide   Result Value Ref Range    Pro-BNP 1,212 (H) <300 PG/ML   Blood Gas, Venous   Result Value Ref Range    pH, Gianluca 7.29 (L) 7.32 - 7.42    pCO2, [place] 71 (H) 38 - 52 mmHG    pO2, G[place]luca 39 28 - 48 mmHG    Base Exc, Mixed 5.1 (H) 0 - 1.2    HCO3, Venous 34.1 (H) 19 - 25 MMOL/L    O2 Sat, Gianluca 60.9 50 - 70 %    Comment VBG    Blood gas, arterial   Result Value Ref Range    pH, Bld 7.44 7.34 - 7.45    pCO2, Arterial 41.0 32 - 45 MMHG    pO2, Arterial 73 (L) 75 - 100 MMHG    Base Exc, Mixed 3.3 (H) 0 - 1.2    HCO3, Arterial 27.8 (H) 18 - 23 MMOL/L    CO2 Content 29.1 (H) 19 - 24 MMOL/L    O2 Sat 92.8 (L) 96 - 97 %    Carbon Monoxide, Blood 2.1 0 - 5 %    Methemoglobin, Arterial 1.5 (H) <1.5 %    Comment 2LNC    TSH without Reflex   Result Value Ref Range    TSH, High Sensitivity 0.781 0.270 - 4.20 uIu/ml   T4, free   Result Value Ref Range    T4 Free 1.23 0.9 - 1.8 NG/DL   EKG 12 Lead   Result Value Ref Range    Ventricular Rate 66 BPM    Atrial Rate 66 BPM    P-R Interval 126 ms    QRS Duration 88 ms    Q-T Interval 370 ms    QTc Calculation (Bazett) 387 ms    P Axis 95 degrees    R Axis 59 degrees    T Axis 81 degrees    Diagnosis       Normal sinus rhythm  Normal ECG  No previous ECGs available        Radiographs (if obtained):  Radiologist's Report Reviewed:  No results found. EKG (if obtained): (All EKG's are interpreted by myself in the absence of a cardiologist)  Normal sinus rhythm with rate of 66 bpm, normal intervals. No ST elevation. No previous to compare. Normal EKG.         MDM:  23-year-old male with history as above presents with concern for shortness of breath, he has put on a significant amount of weight, had been stopped on his diuretic 1 week ago. Suspect fluid overload given clinical exam and this history. We will also screen for viral illness and pneumonia with labs. He and wife have both been ill this week. Found to have influenza A, with pleural effusion, he is hypercapnic in addition to hypoxic, and Vapotherm ordered, he is alert and oriented. White blood cell count is normal, low suspicion for pneumonia. BNP slightly elevated, troponin is normal.  Sodium 130, potassium normal.  Plan will be for admission. Discussed with hospitalist team.    Total critical care time today provided was 35minutes. This excludes seperately billable procedure. Critical care time provided for respiratory distress that required close evaluation and/or intervention with concern for patient decompensation. Clinical Impression:  1. Influenza A    2. Acute respiratory failure with hypoxia and hypercapnia (HCC)    3. Hypervolemia, unspecified hypervolemia type    4. Pleural effusion      Disposition referral (if applicable):  No follow-up provider specified. Disposition medications (if applicable):  Current Discharge Medication List        ED Provider Disposition Time  DISPOSITION Admitted 11/22/2022 01:40:38 PM      Comment: Please note this report has been produced using speech recognition software and may contain errors related to that system including errors in grammar, punctuation, and spelling, as well as words and phrases that may be inappropriate. Efforts were made to edit the dictations.        Leila Bell MD  11/22/22 0088

## 2022-11-22 NOTE — ED NOTES
Pt has c/o sob that started yesterday. Pt is a/o sating at 84% on room air. Pt was placed on 5L via nasal cannula and now sating at 100%. Pt does have a hx of COPD. Pt stated he was trying to inhale his inhaler and was unable to. Pt was feeling very sob. Pt also has a cough that started yesterday as well. Denies any fever.       Avis Estevez RN  11/22/22 6811

## 2022-11-23 LAB
ANION GAP SERPL CALCULATED.3IONS-SCNC: 11 MMOL/L (ref 4–16)
BASOPHILS ABSOLUTE: 0 K/CU MM
BASOPHILS RELATIVE PERCENT: 0 % (ref 0–1)
BUN BLDV-MCNC: 20 MG/DL (ref 6–23)
CALCIUM SERPL-MCNC: 9 MG/DL (ref 8.3–10.6)
CHLORIDE BLD-SCNC: 90 MMOL/L (ref 99–110)
CHOLESTEROL: 156 MG/DL
CO2: 28 MMOL/L (ref 21–32)
CREAT SERPL-MCNC: 1.2 MG/DL (ref 0.9–1.3)
DIFFERENTIAL TYPE: ABNORMAL
EOSINOPHILS ABSOLUTE: 0 K/CU MM
EOSINOPHILS RELATIVE PERCENT: 0 % (ref 0–3)
GFR SERPL CREATININE-BSD FRML MDRD: >60 ML/MIN/1.73M2
GLUCOSE BLD-MCNC: 124 MG/DL (ref 70–99)
HCT VFR BLD CALC: 40.7 % (ref 42–52)
HDLC SERPL-MCNC: 85 MG/DL
HEMOGLOBIN: 13.7 GM/DL (ref 13.5–18)
IMMATURE NEUTROPHIL %: 1.1 % (ref 0–0.43)
LDL CHOLESTEROL CALCULATED: 55 MG/DL
LYMPHOCYTES ABSOLUTE: 0.4 K/CU MM
LYMPHOCYTES RELATIVE PERCENT: 7.3 % (ref 24–44)
MAGNESIUM: 2 MG/DL (ref 1.8–2.4)
MCH RBC QN AUTO: 33.9 PG (ref 27–31)
MCHC RBC AUTO-ENTMCNC: 33.7 % (ref 32–36)
MCV RBC AUTO: 100.7 FL (ref 78–100)
MONOCYTES ABSOLUTE: 0.4 K/CU MM
MONOCYTES RELATIVE PERCENT: 6.4 % (ref 0–4)
NUCLEATED RBC %: 0 %
PDW BLD-RTO: 13.7 % (ref 11.7–14.9)
PLATELET # BLD: 231 K/CU MM (ref 140–440)
PMV BLD AUTO: 8.5 FL (ref 7.5–11.1)
POTASSIUM SERPL-SCNC: 4.4 MMOL/L (ref 3.5–5.1)
RBC # BLD: 4.04 M/CU MM (ref 4.6–6.2)
SEGMENTED NEUTROPHILS ABSOLUTE COUNT: 4.8 K/CU MM
SEGMENTED NEUTROPHILS RELATIVE PERCENT: 85.2 % (ref 36–66)
SODIUM BLD-SCNC: 129 MMOL/L (ref 135–145)
TOTAL IMMATURE NEUTOROPHIL: 0.06 K/CU MM
TOTAL NUCLEATED RBC: 0 K/CU MM
TRIGL SERPL-MCNC: 82 MG/DL
WBC # BLD: 5.7 K/CU MM (ref 4–10.5)

## 2022-11-23 PROCEDURE — 6370000000 HC RX 637 (ALT 250 FOR IP): Performed by: NURSE PRACTITIONER

## 2022-11-23 PROCEDURE — 80048 BASIC METABOLIC PNL TOTAL CA: CPT

## 2022-11-23 PROCEDURE — 94761 N-INVAS EAR/PLS OXIMETRY MLT: CPT

## 2022-11-23 PROCEDURE — 1200000000 HC SEMI PRIVATE

## 2022-11-23 PROCEDURE — 94640 AIRWAY INHALATION TREATMENT: CPT

## 2022-11-23 PROCEDURE — 2700000000 HC OXYGEN THERAPY PER DAY

## 2022-11-23 PROCEDURE — 36415 COLL VENOUS BLD VENIPUNCTURE: CPT

## 2022-11-23 PROCEDURE — 6370000000 HC RX 637 (ALT 250 FOR IP): Performed by: STUDENT IN AN ORGANIZED HEALTH CARE EDUCATION/TRAINING PROGRAM

## 2022-11-23 PROCEDURE — 2580000003 HC RX 258: Performed by: NURSE PRACTITIONER

## 2022-11-23 PROCEDURE — 83735 ASSAY OF MAGNESIUM: CPT

## 2022-11-23 PROCEDURE — 85025 COMPLETE CBC W/AUTO DIFF WBC: CPT

## 2022-11-23 PROCEDURE — 80061 LIPID PANEL: CPT

## 2022-11-23 PROCEDURE — 6360000002 HC RX W HCPCS: Performed by: NURSE PRACTITIONER

## 2022-11-23 RX ORDER — FUROSEMIDE 40 MG/1
40 TABLET ORAL DAILY
Status: DISCONTINUED | OUTPATIENT
Start: 2022-11-23 | End: 2022-11-25 | Stop reason: HOSPADM

## 2022-11-23 RX ORDER — AZITHROMYCIN 250 MG/1
500 TABLET, FILM COATED ORAL DAILY
Status: COMPLETED | OUTPATIENT
Start: 2022-11-23 | End: 2022-11-25

## 2022-11-23 RX ORDER — OSELTAMIVIR PHOSPHATE 75 MG/1
75 CAPSULE ORAL 2 TIMES DAILY
Status: DISCONTINUED | OUTPATIENT
Start: 2022-11-23 | End: 2022-11-25 | Stop reason: HOSPADM

## 2022-11-23 RX ADMIN — ENOXAPARIN SODIUM 40 MG: 100 INJECTION SUBCUTANEOUS at 08:17

## 2022-11-23 RX ADMIN — ACETAMINOPHEN 650 MG: 325 TABLET ORAL at 13:59

## 2022-11-23 RX ADMIN — FUROSEMIDE 20 MG: 10 INJECTION, SOLUTION INTRAVENOUS at 08:18

## 2022-11-23 RX ADMIN — TAMSULOSIN HYDROCHLORIDE 0.4 MG: 0.4 CAPSULE ORAL at 08:18

## 2022-11-23 RX ADMIN — AZITHROMYCIN MONOHYDRATE 500 MG: 250 TABLET ORAL at 08:16

## 2022-11-23 RX ADMIN — OSELTAMIVIR PHOSPHATE 75 MG: 75 CAPSULE ORAL at 21:02

## 2022-11-23 RX ADMIN — SODIUM CHLORIDE, PRESERVATIVE FREE 10 ML: 5 INJECTION INTRAVENOUS at 08:16

## 2022-11-23 RX ADMIN — OSELTAMIVIR PHOSPHATE 75 MG: 75 CAPSULE ORAL at 08:51

## 2022-11-23 RX ADMIN — ASPIRIN 81 MG: 81 TABLET, COATED ORAL at 08:16

## 2022-11-23 RX ADMIN — ACETAMINOPHEN 650 MG: 325 TABLET ORAL at 06:30

## 2022-11-23 RX ADMIN — METHYLPREDNISOLONE SODIUM SUCCINATE 40 MG: 40 INJECTION, POWDER, FOR SOLUTION INTRAMUSCULAR; INTRAVENOUS at 08:18

## 2022-11-23 RX ADMIN — METHYLPREDNISOLONE SODIUM SUCCINATE 40 MG: 40 INJECTION, POWDER, FOR SOLUTION INTRAMUSCULAR; INTRAVENOUS at 03:30

## 2022-11-23 RX ADMIN — BUDESONIDE AND FORMOTEROL FUMARATE DIHYDRATE 2 PUFF: 160; 4.5 AEROSOL RESPIRATORY (INHALATION) at 19:25

## 2022-11-23 RX ADMIN — METHYLPREDNISOLONE SODIUM SUCCINATE 40 MG: 40 INJECTION, POWDER, FOR SOLUTION INTRAMUSCULAR; INTRAVENOUS at 16:19

## 2022-11-23 RX ADMIN — ACETAMINOPHEN 650 MG: 325 TABLET ORAL at 21:05

## 2022-11-23 RX ADMIN — PANTOPRAZOLE SODIUM 40 MG: 40 TABLET, DELAYED RELEASE ORAL at 08:16

## 2022-11-23 RX ADMIN — ACETAMINOPHEN 650 MG: 325 TABLET ORAL at 00:27

## 2022-11-23 RX ADMIN — ATORVASTATIN CALCIUM 40 MG: 40 TABLET, FILM COATED ORAL at 21:02

## 2022-11-23 RX ADMIN — METOPROLOL TARTRATE 25 MG: 25 TABLET, FILM COATED ORAL at 21:03

## 2022-11-23 RX ADMIN — METOPROLOL TARTRATE 25 MG: 25 TABLET, FILM COATED ORAL at 08:18

## 2022-11-23 RX ADMIN — METHYLPREDNISOLONE SODIUM SUCCINATE 40 MG: 40 INJECTION, POWDER, FOR SOLUTION INTRAMUSCULAR; INTRAVENOUS at 22:56

## 2022-11-23 RX ADMIN — SODIUM CHLORIDE, PRESERVATIVE FREE 10 ML: 5 INJECTION INTRAVENOUS at 21:03

## 2022-11-23 ASSESSMENT — PAIN DESCRIPTION - ORIENTATION
ORIENTATION: LOWER
ORIENTATION: LOWER

## 2022-11-23 ASSESSMENT — PAIN DESCRIPTION - LOCATION
LOCATION: BACK
LOCATION: BACK

## 2022-11-23 ASSESSMENT — PAIN DESCRIPTION - DESCRIPTORS
DESCRIPTORS: DISCOMFORT
DESCRIPTORS: DISCOMFORT

## 2022-11-23 ASSESSMENT — PAIN SCALES - GENERAL
PAINLEVEL_OUTOF10: 3
PAINLEVEL_OUTOF10: 2

## 2022-11-23 ASSESSMENT — PAIN - FUNCTIONAL ASSESSMENT
PAIN_FUNCTIONAL_ASSESSMENT: ACTIVITIES ARE NOT PREVENTED
PAIN_FUNCTIONAL_ASSESSMENT: ACTIVITIES ARE NOT PREVENTED

## 2022-11-23 NOTE — CONSULTS
Subjective:   CHIEF COMPLAINT / HPI:  66year old male admitted with worsening sob and wheeze associated with it. He has mild cough but no sputum production. He denies any fever or chest pain or hemoptysis. Past Medical History:  Past Medical History:   Diagnosis Date    CAD (coronary artery disease)     Sees Dr. Martino [facility])     bladder, prostate and right kidney    Carotid stenosis     Chronic back pain     low back    COPD (chronic obstructive pulmonary disease) (HCC)     Erectile dysfunction     Fatigue     Hematuria, gross     Bladder tumor surgery scheduled for 6/30/14.     History of external beam radiation therapy 2017    Prostate 6,600 cGy per  at [facility]    Hyperlipidemia     Hypertension     MI (mitral incompetence)     MI (myocardial infarction) ([facility] [address] 2010    Osteoarthritis     low back    Peyronie's disease     Pneumothorax 2002    hemothorax - s/p fall - had chest tube    Retinal detachment     left    Sciatica        Past Surgical History:        Procedure Laterality Date    APPENDECTOMY  1957    BLADDER TUMOR EXCISION  06/30/14    TURB w r stent placement    CARDIOVASCULAR STRESS TEST  6/2011 & 6/23/2014    COLONOSCOPY  10/16/2014    polyps times seven, diverticulosis    EYE SURGERY  2011    cataract removal    EYE SURGERY  2004    retinal detachment    INCISIONAL HERNIA REPAIR N/A 12/10/2015    NASAL HEMORRHAGE CONTROL  1991    PROSTATECTOMY N/A 12/10/2015    robotic assisted laporascopic    TONSILLECTOMY AND ADENOIDECTOMY  childhood    TOTAL NEPHRECTOMY Right 2018    at [address] [facility] N/A 12/10/2015       Current Medications:    Current Facility-Administered Medications: oseltamivir (TAMIFLU) capsule 75 mg, 75 mg, Oral, BID  azithromycin (ZITHROMAX) tablet 500 mg, 500 mg, Oral, Daily  acetaminophen (TYLENOL) tablet 650 mg, 650 mg, Oral, Q6H  aspirin EC tablet 81 mg, 81 mg, Oral, Daily  atorvastatin (LIPITOR) tablet 40 mg, 40 mg, Oral, Nightly  budesonide-formoterol (SYMBICORT) 160-4.5 MCG/ACT inhaler 2 puff, 2 puff, Inhalation, BID  metoprolol tartrate (LOPRESSOR) tablet 25 mg, 25 mg, Oral, BID  pantoprazole (PROTONIX) tablet 40 mg, 40 mg, Oral, Daily  tiotropium (SPIRIVA RESPIMAT) 2.5 MCG/ACT inhaler 2 puff, 2 puff, Inhalation, Daily  tamsulosin (FLOMAX) capsule 0.4 mg, 0.4 mg, Oral, Daily  sodium chloride flush 0.9 % injection 5-40 mL, 5-40 mL, IntraVENous, 2 times per day  sodium chloride flush 0.9 % injection 5-40 mL, 5-40 mL, IntraVENous, PRN  0.9 % sodium chloride infusion, , IntraVENous, PRN  ondansetron (ZOFRAN-ODT) disintegrating tablet 4 mg, 4 mg, Oral, Q8H PRN **OR** ondansetron (ZOFRAN) injection 4 mg, 4 mg, IntraVENous, Q6H PRN  polyethylene glycol (GLYCOLAX) packet 17 g, 17 g, Oral, Daily PRN  acetaminophen (TYLENOL) tablet 650 mg, 650 mg, Oral, Q6H PRN **OR** acetaminophen (TYLENOL) suppository 650 mg, 650 mg, Rectal, Q6H PRN  enoxaparin (LOVENOX) injection 40 mg, 40 mg, SubCUTAneous, Daily  nicotine (NICODERM CQ) 21 MG/24HR 1 patch, 1 patch, TransDERmal, Daily  nicotine polacrilex (COMMIT) lozenge 2 mg, 2 mg, Oral, Q1H PRN  methylPREDNISolone sodium (SOLU-MEDROL) injection 40 mg, 40 mg, IntraVENous, Q6H **FOLLOWED BY** [START ON 11/25/2022] predniSONE (DELTASONE) tablet 40 mg, 40 mg, Oral, Daily  furosemide (LASIX) injection 20 mg, 20 mg, IntraVENous, BID  acetaminophen (TYLENOL) tablet 1,000 mg, 1,000 mg, Oral, Nightly PRN **AND** diphenhydrAMINE (BENADRYL) tablet 50 mg, 50 mg, Oral, Nightly PRN    Allergies   Allergen Reactions    Lisinopril Other (See Comments)     Cough    Triamterene-Hydrochlorothiazide [Hydrochlorothiazide W-Triamterene] Rash       Social History:    Social History     Socioeconomic History    Marital status:    Occupational History    Occupation: [employer]   Tobacco Use    Smoking status: Every Day     Packs/day: 2.00     Years: 40.00     Pack years: 80.00     Types: Cigarettes    Smokeless tobacco: Current   Substance and Sexual Activity    Alcohol use: Yes     Alcohol/week: 0.0 standard drinks     Comment: 2 vodka martinis daily      CAFFEINE: 2 cups coffee & can pop daily    Drug use: No       Family History:   Family History   Problem Relation Age of Onset    Cancer Mother         colon, kidney, liver    Prostate Cancer Brother     Depression Brother     High Blood Pressure Brother     Other Father         AAA    High Blood Pressure Father         possible    Substance Abuse Brother         alcohol, tobacco    Cancer Brother         prostate       Immunization:  Immunization History   Administered Date(s) Administered    COVID-19, MODERNA BLUE border, Primary or Immunocompromised, (age 12y+), IM, 100 mcg/0.5mL 02/19/2021    Influenza Virus Vaccine 11/30/2015, 12/05/2018    Pneumococcal Conjugate 13-valent ([id number]) 11/30/2015    Pneumococcal Conjugate 7-valent (Prevnar7) 09/06/2011    Pneumococcal Polysaccharide ([id number]) 12/24/2013, 12/05/2018    Tdap (Boostrix, Adacel) 01/03/2005         REVIEW OF SYSTEMS:    CONSTITUTIONAL:  negative for fevers, chills, diaphoresis, activity change, appetite change, fatigue, night sweats and unexpected weight change.    EYES:  negative for blurred vision, eye discharge, visual disturbance and icterus  HEENT:  negative for hearing loss, tinnitus, ear drainage, sinus pressure, nasal congestion, epistaxis and snoring  RESPIRATORY:  See HPI  CARDIOVASCULAR:  negative for chest pain, palpitations, exertional chest pressure/discomfort, edema, syncope  GASTROINTESTINAL:  negative for nausea, vomiting, diarrhea, constipation, blood in stool and abdominal pain  GENITOURINARY:  negative for frequency, dysuria and hematuria  HEMATOLOGIC/LYMPHATIC:  negative for easy bruising, bleeding and lymphadenopathy  ALLERGIC/IMMUNOLOGIC:  negative for recurrent infections, angioedema, anaphylaxis and drug reactions  ENDOCRINE:  negative for weight changes and diabetic symptoms including polyuria, polydipsia and polyphagia    MUSCULOSKELETAL:  negative for  pain, joint swelling, decreased range of motion and muscle weakness  NEUROLOGICAL:  negative for headaches, slurred speech, unilateral weakness  PSYCHIATRIC/BEHAVIORAL: negative for hallucinations, behavioral problems, confusion and agitation. Objective:   PHYSICAL EXAM:      VITALS:    Vitals:    11/22/22 1930 11/22/22 2018 11/23/22 0005 11/23/22 0331   BP:  118/77 112/77 118/75   Pulse: 97 90  56   Resp: 28 23 21   Temp:  98.2 °F (36.8 °C) 98.1 °F (36.7 °C) 98.1 °F (36.7 °C)   TempSrc:  Oral  Oral   SpO2: 92%   95%   Weight:    182 lb 8 oz (82.8 kg)   Height:             CONSTITUTIONAL:  awake, alert, cooperative, no apparent distress, and appears stated age  NECK:  Supple, symmetrical, trachea midline, no adenopathy, thyroid symmetric, not enlarged and no tenderness  CHEST: Chest expansion equal and symmetrical, no intercostal retraction. LUNGS:  no increased work of breathing, has expiratory wheezes both lungs, no crackles. CARDIOVASCULAR: S1 and S2, no edema and no JVD  ABDOMEN:  normal bowel sounds, non-distended and no masses palpated, and no tenderness to palpation. No hepatospleenomegaly  LYMPHADENOPATHY:  no axillary or supraclavicular adenopathy. No cervical adnenopathy  PSYCHIATRIC: Oriented to person place and time. No obvious depression or anxiety. MUSCULOSKELETAL: No obvious misalignment or effusion of the joints. No clubbing, cyanosis of the digits. RIGHT AND LEFT LOWER EXTREMITIES: No edema, no inflammation, no tenderness. SKIN:  normal skin color, texture, turgor and no redness, warmth, or swelling. No palpable nodules    DATA:             EXAMINATION:   CTA OF THE CHEST 11/22/2022 2:47 pm       TECHNIQUE:   CTA of the chest was performed after the administration of intravenous   contrast.  Multiplanar reformatted images are provided for review. MIP   images are provided for review.  Automated exposure control, iterative   reconstruction, and/or weight based adjustment of the mA/kV was utilized to   reduce the radiation dose to as low as reasonably achievable. COMPARISON:   Abdomen and pelvis CT, 01/24/2017       HISTORY:   ORDERING SYSTEM PROVIDED HISTORY: dyspnea hx of renal cell ca   TECHNOLOGIST PROVIDED HISTORY:   Reason for exam:->dyspnea hx of renal cell ca   Reason for Exam: dyspnea hx of renal cell ca       FINDINGS:   Pulmonary Arteries: The pulmonary arteries are adequately opacified. No   filling defects are seen within the pulmonary arteries to suggest pulmonary   embolism. Mediastinum: Visualized thyroid unremarkable. No mediastinal or hilar   lymphadenopathy. Esophagus unremarkable. Cardiac chambers unremarkable. Thoracic aorta is at the upper limits of   normal in caliber, but is otherwise unremarkable, without evidence of   dissection. Lungs/pleura: Elevation of the right hemidiaphragm is seen, similar when   compared to the previous exam.  Adjacent atelectasis is noted. An acute infiltrate is not identified. There is diffuse bronchial wall   thickening. No filling defects are seen within the airways. All of these findings are seen superimposed on a background emphysema. Upper Abdomen: Limited images of the upper abdomen are unremarkable. Soft Tissues/Bones: Chronic right-sided rib fractures are noted. Multilevel   chronic appearing compression fractures are seen within the thoracic spine. No paravertebral edema is identified. No acute bony abnormality identified. Impression   No evidence of pulmonary embolism or dissection. Bronchial wall thickening, which may be seen in inflammatory conditions such   as bronchitis, reactive airways disease, pulmonary vascular congestion, and   smoking. Abg 7.44/41/73        Assessment:      Ac hypoxemic resp failure  Ac copd  Positive inf A  Small right pl effusion  Covid negative          Plan:     D/w pt  Adequate o2 adm  Vapotherm as needed  Bd rx  Iv steroids  He is already started on tamiflu  Thanks will follow

## 2022-11-23 NOTE — PROGRESS NOTES
Daily Progress Note  Subjective:    Pt. Awake, alert and feeling better  HR stable, NSR  in the 70's, BP stable  No CP, Sob improved per pt. Attending Note:  Moved put of ICU  Feeling better  Viral pneumonia noted  Preserved EF noted on echo  Change to oral diuretics   Hx of COPD  Hx of bladder/renal/and prostate CA   Supportive care     Impression and Plan:     Acute on Chronic HFpEF    EF 50-55% on echo    BNP elevated    On lasix and good UOP    Feeling better slowly    Med. Tx. For now    Positive for the flu-pulm treating  He recently stopped his diuretic OP d/t allergic reaction- likely contributed to fluid retention  Will follow    Most Recent Echo  11/22/22   Summary   Technically difficult examination due to poor acoustical windows from COPD   exacerbation. Left ventricular systolic function is normal.   Ejection fraction is visually estimated at 50-55%. Aortic valve leaflets are somewhat thickened. Mild aortic insufficiency with PHT of 593 msec. Mild tricuspid regurgitation; RVSP: 45 mmHg consistent with PTHN. No evidence of any pericardial effusion. PAST MEDICAL HISTORY:  History of having COPD present. He is tested  positive for influenza. Hypertension present. No stroke and no  seizures present. No MI present. History of bladder cancer, renal  cancer, and prostate cancer present. [facility] [place]. PAST SURGICAL HISTORY:  Prostatectomy done, nephrectomy done, and  appendectomy done. SOCIAL HISTORY:  He still smokes. MEDICATIONS:  At home he is on his Lopressor 25 mg b.i.d. His  medication in the office, he is on aspirin, Lipitor, and metoprolol 25  mg b.i.d. ALLERGIES:  LISINOPRIL, which makes him cough.   Objective:   /79   Pulse 74   Temp 98.3 °F (36.8 °C) (Oral)   Resp 19   Ht 6' 2\" (1.88 m)   Wt 182 lb 8 oz (82.8 kg)   SpO2 95%   BMI 23.43 kg/m²     Intake/Output Summary (Last 24 hours) at 11/23/2022 1203  Last data filed at 11/23/2022 5174  Gross per 24 hour   Intake 240 ml   Output 1950 ml   Net -1710 ml       Medications:   Scheduled Meds:   oseltamivir  75 mg Oral BID    azithromycin  500 mg Oral Daily    furosemide  40 mg Oral Daily    acetaminophen  650 mg Oral Q6H    aspirin  81 mg Oral Daily    atorvastatin  40 mg Oral Nightly    budesonide-formoterol  2 puff Inhalation BID    metoprolol tartrate  25 mg Oral BID    pantoprazole  40 mg Oral Daily    tiotropium  2 puff Inhalation Daily    tamsulosin  0.4 mg Oral Daily    sodium chloride flush  5-40 mL IntraVENous 2 times per day    enoxaparin  40 mg SubCUTAneous Daily    nicotine  1 patch TransDERmal Daily    methylPREDNISolone  40 mg IntraVENous Q6H    Followed by    Aury Martinez ON 11/25/2022] predniSONE  40 mg Oral Daily      Infusions:   sodium chloride        PRN Meds:  sodium chloride flush, sodium chloride, ondansetron **OR** ondansetron, polyethylene glycol, acetaminophen **OR** acetaminophen, nicotine polacrilex, acetaminophen **AND** diphenhydramine     Physical Exam:  Vitals:    11/23/22 1015   BP: 122/79   Pulse: 74   Resp: 19   Temp: 98.3 °F (36.8 °C)   SpO2: 95%        General: AAO, NAD  Chest: Nontender  Cardiac: First and Second Heart Sounds are Normal, No Murmurs or Gallops noted  Lungs:Clear to auscultation and percussion. Abdomen: Soft, NT, ND, +BS  Extremities: No clubbing, no edema  Vascular:  Equal 2+ peripheral pulses. Lab Data:  CBC:   Recent Labs     11/22/22  1128 11/23/22  0556   WBC 6.5 5.7   HGB 14.1 13.7   HCT 42.7 40.7*   .4* 100.7*    231     BMP:   Recent Labs     11/22/22  1128 11/23/22  0556   * 129*   K 4.1 4.4   CL 90* 90*   CO2 28 28   BUN 15 20   CREATININE 1.2 1.2     LIVER PROFILE:   Recent Labs     11/22/22  1128   AST 22   ALT 19   BILITOT 0.7   ALKPHOS 121     PT/INR: No results for input(s): PROTIME, INR in the last 72 hours. APTT: No results for input(s):  APTT in the last 72 hours.  BNP:  No results for input(s): BNP in the last 72 hours.       Assessment:  Patient Active Problem List    Diagnosis Date Noted    Acute respiratory failure with hypoxia ([facility] [address] 11/22/2022    Hordeolum externum of right lower eyelid 10/07/2017    Malignant tumor of kidney ([address] 10/07/2017    Urinary frequency 06/06/2017    GERD (gastroesophageal reflux disease) 06/06/2017    Alcohol dependence ([address] 12/11/2015    Prostate cancer ([facility] [address] 11/30/2015    History of MI (myocardial infarction) 07/23/2015    CAD (coronary artery disease)     Tobacco use 08/04/2014    ED (erectile dysfunction) 08/04/2014    Bladder cancer ([address] 07/07/2014    Malignant neoplasm of lateral wall of urinary bladder ([facility] [address] 06/30/2014    HTN (hypertension) 06/09/2014    Hyperlipidemia with target LDL less than 100 06/09/2014    COPD, severe ([address] 11/20/2013       Electronically signed by Moisés Lares PA-C on 11/23/2022 at 12:03 PM    Electronically signed by Treva Small MD on 11/23/22 at 12:17 PM EST

## 2022-11-23 NOTE — CONSULTS
[address], [address] [place] [address]                                  CONSULTATION    PATIENT NAME: Sofia Ross                    :        1950  MED REC NO:   [id number]                          ROOM:       2002  ACCOUNT NO:   [de-identified]                           ADMIT DATE: 2022  PROVIDER:     Jory Cabot, MD    CONSULT DATE:  2022    INDICATIONS:  Heart failure. HISTORY OF PRESENT ILLNESS:  This is a 75-year-old male patient who was  in the hospital recently. The patient had a stress test done recently  in 2022. Stress test is negative for ischemia. LV function was  preserved. The patient had echo done in 2022 also. Found to have LV  function preserved. Normal LVEDP was noted. The patient has a triple A  about 3.1 cm present. It has been followed. The patient's stress test  I guess that was in 2022; no ischemia, LV function was preserved. The patient has a history of having prostate cancer, bladder cancer, and  renal cancer. The patient had a nephrectomy done at [facility], I  believe it was on the right side. The patient has a history COPD, not  on oxygen. He sees Dr. Valencia Speaks. He comes to the hospital with having  shortness of breath present. He said for the last 24 hours, he is  getting progressively short of breath. The patient had been on triamterene and Maxzide and the patient had an  allergic reaction and had a diffuse rash present. At that time, his  Maxzide was discontinued by his dermatologist, and his rash has been  completely resolved. I did a heart catheterization on him back in  at [facility].  Heart catheterization showed nonobstructive coronary artery present. PAST MEDICAL HISTORY:  History of having COPD present. He is tested  positive for influenza. Hypertension present. No stroke and no  seizures present. No MI present.   History of bladder cancer, renal  cancer, and prostate cancer present. [facility]. PAST SURGICAL HISTORY:  Prostatectomy done, nephrectomy done, and  appendectomy done. SOCIAL HISTORY:  He still smokes. MEDICATIONS:  At home he is on his Lopressor 25 mg b.i.d. His  medication in the office, he is on aspirin, Lipitor, and metoprolol 25  mg b.i.d. ALLERGIES:  LISINOPRIL, which makes him cough. PHYSICAL EXAMINATION:  GENERAL:  The patient is awake, alert, and answering questions, not in  acute distress. VITAL SIGNS:  Temperature is afebrile. Pulse is 69. Blood pressure is  127/72. Sinus rhythm. HEENT:  Head is atraumatic. Pupils are equal and reactive. CHEST:  Equal expansion. LUNGS:  Clear to auscultation. No wheezing or rhonchi present. HEART:  Regular rhythm. ABDOMEN:  Soft and nontender. Bowel sounds are present. No  hepatosplenomegaly or guarding appreciated. EXTREMITIES:  No cyanosis noted. NEUROLOGIC:  Cranial nerves are grossly intact. LABORATORY DATA:  BUN is 15, creatinine is 1.2. Troponin is negative. BNP is 1200. LFTs are normal.  CBC is within normal range. Positive  for rapid influenza. Respiratory acidosis is present. He has a pH of  7.29 and pCO2 of 71. DIAGNOSTIC DATA:  His chest x-ray, a small right pleural effusion  present. IMPRESSION:  This is a 77-year-old male patient who presents with  shortness of breath present, found to have respiratory acidosis present,  and gasses and he has viral pneumonia present. From a cardiac stand at this time, I would continue his beta-blockers  and his diuretics. Echo has been done and I will review that also. The  patient had a recent stress test done. Stress test was negative for  ischemia. LV function was preserved. His echo in the office shows LV  function also preserved. His EKG shows sinus rhythm present.         Jono Newton MD    D: 11/22/2022 16:21:02       T: 11/22/2022 21:49:08     [email or web address]  Job#: [id number]     Doc#: [id number]    CC:

## 2022-11-23 NOTE — PROGRESS NOTES
4 Eyes Skin Assessment     NAME:  Mata Alanis  YOB: 1950  MEDICAL RECORD NUMBER:  [id number]    The patient is being assessed for  Admission    I agree that One RN have performed a thorough Head to Toe Skin Assessment on the patient. ALL assessment sites listed below have been assessed. Areas assessed by both nurses:    Head, Face, Ears, Shoulders, Back, Chest, Arms, Elbows, Hands, Sacrum. Buttock, Coccyx, Ischium, and Legs. Feet and Heels        Does the Patient have a Wound? Other Pt has scattered abrasions on bilateral lower extremities (pt says they used to be blisters and turned into scabs). Pt has one stitch mid back and one on the back of his left knee (which is slightly reddened). Pt has various scattered abrasions across his body  (other than bottom lower extremities).    Trip Prevention initiated by RN: NA   Wound Care Orders initiated by RN: NA    Pressure Injury (Stage 3,4, Unstageable, DTI, NWPT, and Complex wounds) if present place referral order by RN under : NA    New and Established Ostomies, if present place, referral order under : NA      Nurse 1 eSignature: Electronically signed by Jimmie Olguin RN on 11/22/22 at 8:18 PM EST    **SHARE this note so that the co-signing nurse is able to place an eSignature**    Nurse 2 eSignature: Electronically signed by Rafa Zendejas RN on 11/23/22 at 1:19 PM EST

## 2022-11-23 NOTE — CARE COORDINATION
Pt just transferred from ICU. CM reviewed chart and saw pt at bedside. Spouse was at bedside, permission granted to discuss healthcare w/ spouse present. Pt is a very pleasant  whom resides w/ his spouse. Prior to hospitalization pt states he amb independently. Pt has walker at home, does not current use. Pt is NOT on home O2. Pt is agreeable to PT/OT eval to assist with safe d/c planning.   CM sent PS to Dr. Lety Brush requesting eval.  CM will con't to follow

## 2022-11-23 NOTE — PROGRESS NOTES
V2.0  [facility] Hospitalist Progress Note      Name:  Patrick Renae /Age/Sex: 1950  (66 y.o. male)   MRN & CSN:  [id number] & [id number] Encounter Date/Time: 2022 11:11 AM EST    Location:  [address] [place] [address]-A PCP: Angelo Lyons MD       Hospital Day: 2    Assessment and Plan:   Patrick Renae is a 66 y.o. male with pmh of active tobacco use, COPD, hypertension, nonobstructive CAD and history of bladder cancer prostate cancer and renal cancer s/p prostatectomy and nephrectomy who presents with Acute respiratory failure with hypoxia ([facility]      # Acute respiratory failure with hypoxia and hypercapnia secondary to COPD exacerbation, influenza A and active tobacco use  - Presented with worsening shortness of breath, initially chest x-ray showed small right pleural effusion, CT showed no PE, VBG showed pH 7.29, PCO2 71, PO2 39, HCO3 34, he was requiring Vapotherm, and repeat ABG better oxygenation, influenza positive, started on home Spiriva, Symbicort, IV steroids, duo nebs and on guaifenesin, consulted pulmonology, on 2 L of oxygen this morning, started on Tamiflu and azithromycin, as needed Vapotherm, continue to monitor    # Hypertension, hyperlipidemia, nonischemic CAD with elevated BNP: Suspected fluid overload, consulted cardiology started on IV Lasix twice daily, strict ins/O's, daily weights, fluid and salt restriction, held home Maxide as patient has allergy, continue metoprolol, aspirin and statin, obtained echocardiogram, will follow cardiology recommendations. # Mild hyponatremia: We will continue to follow    # History of bladder cancer s/p excision history of prostate cancer s/p prostatectomy, history of renal cancer status post right total nephrectomy   Continue Flomax, creatinine 1.2, monitor with diuresis        # Active tobacco use  Continue nicotine patch, lozenges as needed,Tobacco cessation advised    Diet ADULT DIET; Regular;  Low Sodium (2 gm); 2000 ml   DVT Prophylaxis [x] Lovenox, []  Heparin, [] SCDs, [] Ambulation,  [] Eliquis, [] Xarelto  [] Coumadin   Code Status Full Code   Disposition From: Home  Expected Disposition: Home  Estimated Date of Discharge: TBD  Patient requires continued admission due to respiratory failure and influenza   Surrogate Decision Maker/ POA Wife     Subjective:     Chief Complaint: Shortness of Breath (Started yesterday. Was taken off his \"water pill\" yesterday. /) and Cough       Patient seen and examined at bedside. Patient states he feels little better but still short of breath requiring oxygen denies any other complaints         Review of Systems:    Review of Systems    Constitutional: No fever no chills  HEENT: No headache no dizziness  Cardiovascular: No chest pain no palpitations  Respiratory: cough shortness of breath  Abdomen: No diarrhea, no nausea, no vomiting, no abdominal pain  Musculoskeletal: No swelling  Neuro: No numbness or tingling  Skin: No rash      Objective: Intake/Output Summary (Last 24 hours) at 11/23/2022 1111  Last data filed at 11/23/2022 0829  Gross per 24 hour   Intake 240 ml   Output 1950 ml   Net -1710 ml        Vitals:   Vitals:    11/23/22 1015   BP: 122/79   Pulse: 74   Resp: 19   Temp: 98.3 °F (36.8 °C)   SpO2: 95%       Physical Exam:     General: Afebrile, no distress but requiring 2 L of oxygen  Eyes: EOMI  ENT: neck supple no JVD  Cardiovascular: S1-S2 normal no murmur  Respiratory: Air entry good bilaterally harsh breath sounds no wheezing  Gastrointestinal: Soft, non tender bowel sounds normal  Genitourinary: no suprapubic tenderness  Musculoskeletal: No edema  Skin: warm, dry  Neuro: Alert. Oriented no focal deficit  Psych: Mood appropriate.      Medications:   Medications:    oseltamivir  75 mg Oral BID    azithromycin  500 mg Oral Daily    furosemide  40 mg Oral Daily    acetaminophen  650 mg Oral Q6H    aspirin  81 mg Oral Daily    atorvastatin  40 mg Oral Nightly    budesonide-formoterol  2 puff Inhalation BID    metoprolol tartrate  25 mg Oral BID    pantoprazole  40 mg Oral Daily    tiotropium  2 puff Inhalation Daily    tamsulosin  0.4 mg Oral Daily    sodium chloride flush  5-40 mL IntraVENous 2 times per day    enoxaparin  40 mg SubCUTAneous Daily    nicotine  1 patch TransDERmal Daily    methylPREDNISolone  40 mg IntraVENous Q6H    Followed by    Greg Irizarry ON 11/25/2022] predniSONE  40 mg Oral Daily      Infusions:    sodium chloride       PRN Meds: sodium chloride flush, 5-40 mL, PRN  sodium chloride, , PRN  ondansetron, 4 mg, Q8H PRN   Or  ondansetron, 4 mg, Q6H PRN  polyethylene glycol, 17 g, Daily PRN  acetaminophen, 650 mg, Q6H PRN   Or  acetaminophen, 650 mg, Q6H PRN  nicotine polacrilex, 2 mg, Q1H PRN  acetaminophen, 1,000 mg, Nightly PRN   And  diphenhydramine, 50 mg, Nightly PRN        Labs      Recent Results (from the past 24 hour(s))   CBC with Auto Differential    Collection Time: 11/22/22 11:28 AM   Result Value Ref Range    WBC 6.5 4.0 - 10.5 K/CU MM    RBC 4.09 (L) 4.6 - 6.2 M/CU MM    Hemoglobin 14.1 13.5 - 18.0 GM/DL    Hematocrit 42.7 42 - 52 %    .4 (H) 78 - 100 FL    MCH 34.5 (H) 27 - 31 PG    MCHC 33.0 32.0 - 36.0 %    RDW 14.2 11.7 - 14.9 %    Platelets 054 722 - 314 K/CU MM    MPV 8.5 7.5 - 11.1 FL    Differential Type AUTOMATED DIFFERENTIAL     Segs Relative 78.0 (H) 36 - 66 %    Lymphocytes % 7.7 (L) 24 - 44 %    Monocytes % 11.8 (H) 0 - 4 %    Eosinophils % 1.1 0 - 3 %    Basophils % 0.5 0 - 1 %    Segs Absolute 5.1 K/CU MM    Lymphocytes Absolute 0.5 K/CU MM    Monocytes Absolute 0.8 K/CU MM    Eosinophils Absolute 0.1 K/CU MM    Basophils Absolute 0.0 K/CU MM    Nucleated RBC % 0.0 %    Total Nucleated RBC 0.0 K/CU MM    Total Immature Neutrophil 0.06 K/CU MM    Immature Neutrophil % 0.9 (H) 0 - 0.43 %   Comprehensive Metabolic Panel    Collection Time: 11/22/22 11:28 AM   Result Value Ref Range    Sodium 130 (L) 135 - 145 MMOL/L    Potassium 4.1 3.5 - 5.1 MMOL/L Chloride 90 (L) 99 - 110 mMol/L    CO2 28 21 - 32 MMOL/L    BUN 15 6 - 23 MG/DL    Creatinine 1.2 0.9 - 1.3 MG/DL    Est, Glom Filt Rate >60 >60 mL/min/1.73m2    Glucose 94 70 - 99 MG/DL    Calcium 9.7 8.3 - 10.6 MG/DL    Albumin 4.3 3.4 - 5.0 GM/DL    Total Protein 7.3 6.4 - 8.2 GM/DL    Total Bilirubin 0.7 0.0 - 1.0 MG/DL    ALT 19 10 - 40 U/L    AST 22 15 - 37 IU/L    Alkaline Phosphatase 121 40 - 129 IU/L    Anion Gap 12 4 - 16   Troponin    Collection Time: 11/22/22 11:28 AM   Result Value Ref Range    Troponin T <0.010 <0.01 NG/ML   Brain Natriuretic Peptide    Collection Time: 11/22/22 11:28 AM   Result Value Ref Range    Pro-BNP 1,212 (H) <300 PG/ML   EKG 12 Lead    Collection Time: 11/22/22 11:28 AM   Result Value Ref Range    Ventricular Rate 66 BPM    Atrial Rate 66 BPM    P-R Interval 126 ms    QRS Duration 88 ms    Q-T Interval 370 ms    QTc Calculation (Bazett) 387 ms    P Axis 95 degrees    R Axis 59 degrees    T Axis 81 degrees    Diagnosis       Normal sinus rhythm  Normal ECG  No previous ECGs available     COVID-19, Rapid    Collection Time: 11/22/22 11:40 AM    Specimen: Nasopharyngeal   Result Value Ref Range    Source UNKNOWN     SARS-CoV-2, NAAT NOT DETECTED NOT DETECTED   Rapid Flu Swab    Collection Time: 11/22/22 11:40 AM    Specimen: Nasopharyngeal   Result Value Ref Range    Rapid Influenza A Ag POSITIVE (A) NEGATIVE    Rapid Influenza B Ag NEGATIVE NEGATIVE   Blood Gas, Venous    Collection Time: 11/22/22 11:45 AM   Result Value Ref Range    pH, Gianluca 7.29 (L) 7.32 - 7.42    pCO2, Gianluca 71 (H) 38 - 52 mmHG    pO2, Gianluca 39 28 - 48 mmHG    Base Exc, Mixed 5.1 (H) 0 - 1.2    HCO3, Venous 34.1 (H) 19 - 25 MMOL/L    O2 Sat, Gianluca 60.9 50 - 70 %    Comment VBG    Blood gas, arterial    Collection Time: 11/22/22  2:00 PM   Result Value Ref Range    pH, Bld 7.44 7.34 - 7.45    pCO2, Arterial 41.0 32 - 45 MMHG    pO2, Arterial 73 (L) 75 - 100 MMHG    Base Exc, Mixed 3.3 (H) 0 - 1.2    HCO3, Arterial 27.8 (H) 18 - 23 MMOL/L    CO2 Content 29.1 (H) 19 - 24 MMOL/L    O2 Sat 92.8 (L) 96 - 97 %    Carbon Monoxide, Blood 2.1 0 - 5 %    Methemoglobin, Arterial 1.5 (H) <1.5 %    Comment 2LNC    TSH without Reflex    Collection Time: 11/22/22  6:09 PM   Result Value Ref Range    TSH, High Sensitivity 0.781 0.270 - 4.20 uIu/ml   T4, free    Collection Time: 11/22/22  6:09 PM   Result Value Ref Range    T4 Free 1.23 0.9 - 1.8 NG/DL   Basic Metabolic Panel    Collection Time: 11/23/22  5:56 AM   Result Value Ref Range    Sodium 129 (L) 135 - 145 MMOL/L    Potassium 4.4 3.5 - 5.1 MMOL/L    Chloride 90 (L) 99 - 110 mMol/L    CO2 28 21 - 32 MMOL/L    Anion Gap 11 4 - 16    BUN 20 6 - 23 MG/DL    Creatinine 1.2 0.9 - 1.3 MG/DL    Est, Glom Filt Rate >60 >60 mL/min/1.73m2    Glucose 124 (H) 70 - 99 MG/DL    Calcium 9.0 8.3 - 10.6 MG/DL   Magnesium    Collection Time: 11/23/22  5:56 AM   Result Value Ref Range    Magnesium 2.0 1.8 - 2.4 mg/dl   Lipid Panel    Collection Time: 11/23/22  5:56 AM   Result Value Ref Range    Triglycerides 82 <150 MG/DL    Cholesterol 156 <200 MG/DL    HDL 85 >40 MG/DL    LDL Calculated 55 <100 MG/DL   CBC with Auto Differential    Collection Time: 11/23/22  5:56 AM   Result Value Ref Range    WBC 5.7 4.0 - 10.5 K/CU MM    RBC 4.04 (L) 4.6 - 6.2 M/CU MM    Hemoglobin 13.7 13.5 - 18.0 GM/DL    Hematocrit 40.7 (L) 42 - 52 %    .7 (H) 78 - 100 FL    MCH 33.9 (H) 27 - 31 PG    MCHC 33.7 32.0 - 36.0 %    RDW 13.7 11.7 - 14.9 %    Platelets 978 082 - 441 K/CU MM    MPV 8.5 7.5 - 11.1 FL    Differential Type AUTOMATED DIFFERENTIAL     Segs Relative 85.2 (H) 36 - 66 %    Lymphocytes % 7.3 (L) 24 - 44 %    Monocytes % 6.4 (H) 0 - 4 %    Eosinophils % 0.0 0 - 3 %    Basophils % 0.0 0 - 1 %    Segs Absolute 4.8 K/CU MM    Lymphocytes Absolute 0.4 K/CU MM    Monocytes Absolute 0.4 K/CU MM    Eosinophils Absolute 0.0 K/CU MM    Basophils Absolute 0.0 K/CU MM    Nucleated RBC % 0.0 %    Total Nucleated RBC 0.0 K/CU MM    Total Immature Neutrophil 0.06 K/CU MM    Immature Neutrophil % 1.1 (H) 0 - 0.43 %        Imaging/Diagnostics Last 24 Hours   XR CHEST PORTABLE    Result Date: 11/22/2022  EXAMINATION: ONE XRAY VIEW OF THE CHEST 11/22/2022 12:00 pm COMPARISON: 12/11/2015. HISTORY: ORDERING SYSTEM PROVIDED HISTORY: SOB TECHNOLOGIST PROVIDED HISTORY: Reason for exam:->SOB Reason for Exam: SOB FINDINGS: The heart size is enlarged but unchanged. The pulmonary vasculature is within normal limits. There is blunting of the right costophrenic angle. There are old healed right rib fractures. No pneumothoraces are seen. 1. Small right pleural effusion. CT CHEST PULMONARY EMBOLISM W CONTRAST    Result Date: 11/22/2022  EXAMINATION: CTA OF THE CHEST 11/22/2022 2:47 pm TECHNIQUE: CTA of the chest was performed after the administration of intravenous contrast.  Multiplanar reformatted images are provided for review. MIP images are provided for review. Automated exposure control, iterative reconstruction, and/or weight based adjustment of the mA/kV was utilized to reduce the radiation dose to as low as reasonably achievable. COMPARISON: Abdomen and pelvis CT, 01/24/2017 HISTORY: ORDERING SYSTEM PROVIDED HISTORY: dyspnea hx of renal cell ca TECHNOLOGIST PROVIDED HISTORY: Reason for exam:->dyspnea hx of renal cell ca Reason for Exam: dyspnea hx of renal cell ca FINDINGS: Pulmonary Arteries: The pulmonary arteries are adequately opacified. No filling defects are seen within the pulmonary arteries to suggest pulmonary embolism. Mediastinum: Visualized thyroid unremarkable. No mediastinal or hilar lymphadenopathy. Esophagus unremarkable. Cardiac chambers unremarkable. Thoracic aorta is at the upper limits of normal in caliber, but is otherwise unremarkable, without evidence of dissection. Lungs/pleura: Elevation of the right hemidiaphragm is seen, similar when compared to the previous exam.  Adjacent atelectasis is noted. An acute infiltrate is not identified. There is diffuse bronchial wall thickening. No filling defects are seen within the airways. All of these findings are seen superimposed on a background emphysema. Upper Abdomen: Limited images of the upper abdomen are unremarkable. Soft Tissues/Bones: Chronic right-sided rib fractures are noted. Multilevel chronic appearing compression fractures are seen within the thoracic spine. No paravertebral edema is identified. No acute bony abnormality identified. No evidence of pulmonary embolism or dissection. Bronchial wall thickening, which may be seen in inflammatory conditions such as bronchitis, reactive airways disease, pulmonary vascular congestion, and smoking.        Electronically signed by Samara Cook MD on 11/23/2022 at 11:11 AM

## 2022-11-23 NOTE — PROGRESS NOTES
Patient arrived in room 96 [id number], alert and oriented. states\" feeling pretty damn good. \" Resting comfortably in bed. Vitals stable. Call light within reach.

## 2022-11-23 NOTE — CONSULTS
Nutrition Education    Educated on Low Sodium Nutrition Therapy  Learners: Patient  Readiness: Acceptance  Method: Explanation and Handout  Response: Verbalizes Understanding  Contact name and number provided.     Elisabeth Vasquez MS, RD, LD  Contact Number: [phone]

## 2022-11-23 NOTE — CARE COORDINATION
CM - Room 3 TR-2 (2002)--Pt is admitted for Dx of Acute Respiratory Failure with Hypoxia --as an Inpatient . Mr Óscar Javier was Not on Oxygen at home , in ER his documented sats dropped to 84% as he was put on 5 L N/C then titrated to 3 Liters . Also mentioned was the use of Heated air flow cannula with a 21 % FIO2 .  This need for greater O2 requirements is consistent with [facility] guidelines     Michael Amador / [id number] / [facility]

## 2022-11-24 PROBLEM — J10.1 INFLUENZA A: Status: ACTIVE | Noted: 2022-11-24

## 2022-11-24 LAB
ANION GAP SERPL CALCULATED.3IONS-SCNC: 12 MMOL/L (ref 4–16)
BASOPHILS ABSOLUTE: 0 K/CU MM
BASOPHILS RELATIVE PERCENT: 0.2 % (ref 0–1)
BUN BLDV-MCNC: 33 MG/DL (ref 6–23)
CALCIUM SERPL-MCNC: 9.1 MG/DL (ref 8.3–10.6)
CHLORIDE BLD-SCNC: 91 MMOL/L (ref 99–110)
CO2: 26 MMOL/L (ref 21–32)
CREAT SERPL-MCNC: 1 MG/DL (ref 0.9–1.3)
DIFFERENTIAL TYPE: ABNORMAL
EKG ATRIAL RATE: 66 BPM
EKG DIAGNOSIS: NORMAL
EKG P AXIS: 95 DEGREES
EKG P-R INTERVAL: 126 MS
EKG Q-T INTERVAL: 370 MS
EKG QRS DURATION: 88 MS
EKG QTC CALCULATION (BAZETT): 387 MS
EKG R AXIS: 59 DEGREES
EKG T AXIS: 81 DEGREES
EKG VENTRICULAR RATE: 66 BPM
EOSINOPHILS ABSOLUTE: 0 K/CU MM
EOSINOPHILS RELATIVE PERCENT: 0 % (ref 0–3)
GFR SERPL CREATININE-BSD FRML MDRD: >60 ML/MIN/1.73M2
GLUCOSE BLD-MCNC: 128 MG/DL (ref 70–99)
HCT VFR BLD CALC: 40.4 % (ref 42–52)
HEMOGLOBIN: 14 GM/DL (ref 13.5–18)
IMMATURE NEUTROPHIL %: 0.5 % (ref 0–0.43)
LYMPHOCYTES ABSOLUTE: 0.6 K/CU MM
LYMPHOCYTES RELATIVE PERCENT: 4.8 % (ref 24–44)
MAGNESIUM: 2 MG/DL (ref 1.8–2.4)
MCH RBC QN AUTO: 34.4 PG (ref 27–31)
MCHC RBC AUTO-ENTMCNC: 34.7 % (ref 32–36)
MCV RBC AUTO: 99.3 FL (ref 78–100)
MONOCYTES ABSOLUTE: 0.6 K/CU MM
MONOCYTES RELATIVE PERCENT: 4.9 % (ref 0–4)
NUCLEATED RBC %: 0 %
PDW BLD-RTO: 13.4 % (ref 11.7–14.9)
PLATELET # BLD: 249 K/CU MM (ref 140–440)
PMV BLD AUTO: 8.5 FL (ref 7.5–11.1)
POTASSIUM SERPL-SCNC: 3.8 MMOL/L (ref 3.5–5.1)
PROCALCITONIN: 0.06
RBC # BLD: 4.07 M/CU MM (ref 4.6–6.2)
SEGMENTED NEUTROPHILS ABSOLUTE COUNT: 11.7 K/CU MM
SEGMENTED NEUTROPHILS RELATIVE PERCENT: 89.6 % (ref 36–66)
SODIUM BLD-SCNC: 129 MMOL/L (ref 135–145)
TOTAL IMMATURE NEUTOROPHIL: 0.06 K/CU MM
TOTAL NUCLEATED RBC: 0 K/CU MM
WBC # BLD: 13 K/CU MM (ref 4–10.5)

## 2022-11-24 PROCEDURE — 94640 AIRWAY INHALATION TREATMENT: CPT

## 2022-11-24 PROCEDURE — 99232 SBSQ HOSP IP/OBS MODERATE 35: CPT | Performed by: INTERNAL MEDICINE

## 2022-11-24 PROCEDURE — 36415 COLL VENOUS BLD VENIPUNCTURE: CPT

## 2022-11-24 PROCEDURE — 1200000000 HC SEMI PRIVATE

## 2022-11-24 PROCEDURE — 6370000000 HC RX 637 (ALT 250 FOR IP): Performed by: STUDENT IN AN ORGANIZED HEALTH CARE EDUCATION/TRAINING PROGRAM

## 2022-11-24 PROCEDURE — 93010 ELECTROCARDIOGRAM REPORT: CPT | Performed by: INTERNAL MEDICINE

## 2022-11-24 PROCEDURE — 80048 BASIC METABOLIC PNL TOTAL CA: CPT

## 2022-11-24 PROCEDURE — 94761 N-INVAS EAR/PLS OXIMETRY MLT: CPT

## 2022-11-24 PROCEDURE — 2700000000 HC OXYGEN THERAPY PER DAY

## 2022-11-24 PROCEDURE — 6370000000 HC RX 637 (ALT 250 FOR IP): Performed by: INTERNAL MEDICINE

## 2022-11-24 PROCEDURE — 6370000000 HC RX 637 (ALT 250 FOR IP): Performed by: NURSE PRACTITIONER

## 2022-11-24 PROCEDURE — 97116 GAIT TRAINING THERAPY: CPT

## 2022-11-24 PROCEDURE — 84145 PROCALCITONIN (PCT): CPT

## 2022-11-24 PROCEDURE — 97162 PT EVAL MOD COMPLEX 30 MIN: CPT

## 2022-11-24 PROCEDURE — 6360000002 HC RX W HCPCS: Performed by: NURSE PRACTITIONER

## 2022-11-24 PROCEDURE — 2580000003 HC RX 258: Performed by: NURSE PRACTITIONER

## 2022-11-24 PROCEDURE — 85025 COMPLETE CBC W/AUTO DIFF WBC: CPT

## 2022-11-24 PROCEDURE — 87899 AGENT NOS ASSAY W/OPTIC: CPT

## 2022-11-24 PROCEDURE — 87449 NOS EACH ORGANISM AG IA: CPT

## 2022-11-24 PROCEDURE — 97530 THERAPEUTIC ACTIVITIES: CPT

## 2022-11-24 PROCEDURE — 83735 ASSAY OF MAGNESIUM: CPT

## 2022-11-24 RX ADMIN — METOPROLOL TARTRATE 25 MG: 25 TABLET, FILM COATED ORAL at 20:26

## 2022-11-24 RX ADMIN — ACETAMINOPHEN 650 MG: 325 TABLET ORAL at 01:33

## 2022-11-24 RX ADMIN — SODIUM CHLORIDE, PRESERVATIVE FREE 10 ML: 5 INJECTION INTRAVENOUS at 09:59

## 2022-11-24 RX ADMIN — ENOXAPARIN SODIUM 40 MG: 100 INJECTION SUBCUTANEOUS at 09:57

## 2022-11-24 RX ADMIN — TIOTROPIUM BROMIDE INHALATION SPRAY 2 PUFF: 3.12 SPRAY, METERED RESPIRATORY (INHALATION) at 08:45

## 2022-11-24 RX ADMIN — FUROSEMIDE 40 MG: 40 TABLET ORAL at 09:57

## 2022-11-24 RX ADMIN — METHYLPREDNISOLONE SODIUM SUCCINATE 40 MG: 40 INJECTION, POWDER, FOR SOLUTION INTRAMUSCULAR; INTRAVENOUS at 09:58

## 2022-11-24 RX ADMIN — METOPROLOL TARTRATE 25 MG: 25 TABLET, FILM COATED ORAL at 09:57

## 2022-11-24 RX ADMIN — TAMSULOSIN HYDROCHLORIDE 0.4 MG: 0.4 CAPSULE ORAL at 09:57

## 2022-11-24 RX ADMIN — ATORVASTATIN CALCIUM 40 MG: 40 TABLET, FILM COATED ORAL at 20:26

## 2022-11-24 RX ADMIN — BUDESONIDE AND FORMOTEROL FUMARATE DIHYDRATE 2 PUFF: 160; 4.5 AEROSOL RESPIRATORY (INHALATION) at 08:45

## 2022-11-24 RX ADMIN — PANTOPRAZOLE SODIUM 40 MG: 40 TABLET, DELAYED RELEASE ORAL at 09:57

## 2022-11-24 RX ADMIN — OSELTAMIVIR PHOSPHATE 75 MG: 75 CAPSULE ORAL at 20:26

## 2022-11-24 RX ADMIN — METHYLPREDNISOLONE SODIUM SUCCINATE 40 MG: 40 INJECTION, POWDER, FOR SOLUTION INTRAMUSCULAR; INTRAVENOUS at 05:31

## 2022-11-24 RX ADMIN — AZITHROMYCIN MONOHYDRATE 500 MG: 250 TABLET ORAL at 09:57

## 2022-11-24 RX ADMIN — ACETAMINOPHEN 650 MG: 325 TABLET ORAL at 06:51

## 2022-11-24 RX ADMIN — BUDESONIDE AND FORMOTEROL FUMARATE DIHYDRATE 2 PUFF: 160; 4.5 AEROSOL RESPIRATORY (INHALATION) at 20:13

## 2022-11-24 RX ADMIN — SODIUM CHLORIDE, PRESERVATIVE FREE 10 ML: 5 INJECTION INTRAVENOUS at 20:26

## 2022-11-24 RX ADMIN — OSELTAMIVIR PHOSPHATE 75 MG: 75 CAPSULE ORAL at 09:57

## 2022-11-24 RX ADMIN — ASPIRIN 81 MG: 81 TABLET, COATED ORAL at 09:57

## 2022-11-24 ASSESSMENT — PAIN SCALES - GENERAL: PAINLEVEL_OUTOF10: 0

## 2022-11-24 NOTE — CONSULTS
[id number] [facility], 1950, 1127/1127-A, 11/24/2022    Discharge Recommendation: Home, outpatient PT    Equipment: no new needs    History  [place]:  The primary encounter diagnosis was Influenza A. Diagnoses of Acute respiratory failure with hypoxia and hypercapnia (HCC), Hypervolemia, unspecified hypervolemia type, and Pleural effusion were also pertinent to this visit. Subjective:  Patient states:  agreeable to PT evaluation. Feels like respiratory status has improved significantly since yesterday    Pain:  denies. Communication with other providers:   Restrictions: fall risk    Home Setup/Prior level of function  Social/Functional History  Lives With: SpouseLives With: Spouse  Type of Home: House  Home Layout: Multi-level, Laundry in basement (tri-level with basement)  Home Access: Stairs to enter with rails  Bathroom Shower/Tub: Walk-in shower  Bathroom Toilet: Standard  Additional Comments: independent with all ADL's and stairs prior to hospital stay  IADL History  Active : Yes  Mode of Transportation: Car  Occupation: Retired  Type of occupation:   Leisure & Hobbies: none stated besides playing on the computer  IADL Comments: independent with all ADL's and activities  Prior Function  ADL Assistance: Independent  Homemaking Assistance: Independent  Ambulation Assistance: Independent  Transfer Assistance: Independent  Vocational: Retired  Leisure: Hobbies - No  Owns RW, does not typically use  Spouse in fairly good health and can assist pt as needed at dc  Examination of body systems (includes body structures/functions, activity/participation limitations):  Observation:  Supine in bed upon arrival   Vision:  [facility]  Hearing:  [facility]  Cardiopulmonary:  VSS, O2 sat 91% on RA  Cognition: WFL, see OT/SLP note for further evaluation. Body Structures/Function  ROM R/L:  WFL.     Strength R/L:  5/5, mild LE weakness observed in function. Neuro:  WFL      Mobility:  Rolling L/R:  ind  Supine to sit:  ind  Transfers: mod I at RW, SBA without device  Sitting balance:  ind.    Standing balance:  SBA with RW. Pt initially attempts gait/transfers without device using wide PAL, staggering steps, reaching for support surfaces but no LOB. Improves with time in standing but improves to supervision with use of RW. Gait: 10 ft without device SBA, staggering short steps with reaching for support surfaces. Transitioned to RW-- 75 ft x 2 with improved step length, speed, and stability. One short standing rest break, post ambulation O2 sat 89%, tolerates well    [facility] Clicks Inpatient Mobility:   Current: 20/24  PLOF: 21/24    Treatment:  Gait Training:  Cues were given for safety, sequence, device management, balance, posture, awareness, path. Therapeutic Activity Training:   Therapeutic activity training was instructed today. Cues were given for safety, sequence, UE/LE placement, awareness, and balance. Activities performed today included bed mobility training, sup-sit, sit-stand, SPT. Safety:  patient left in chair with chair alarm, call light within reach, RN notified, gait belt used. Assessment:  Pt is a 77 yo male who presents to [facility] with COPD exacerbation and flu requiring supp O2 and vapotherm. At baseline, he is independent community ambulator and does not use home O2. PT evaluation today shows strength and balance deficits that are mostly consistent with PLOF, but he is functioning slightly below his typical aerobic capacity. Currently SBA with RW. Recommend dc home with outpatient PT for dynamic balance training. Educated on recommendation of using his RW upon dc with slow progression toward PLOF activities. Activity Tolerance: good  Complexity: Moderate  Prognosis: Good  Plan  days/week: 2-3/ 1 week         Goals:   Pt will sit<-> stand with LRAD at mod I  2.  Pt will ambulate 150 ft with LRAD at supervision with no need for rest break, stable O2 sat  3.  Pt will ascend/descend 3 stairs with one rail at SBA      Treatment plan:  Bed mobility, transfers, balance, gait, TA, TX, device training, safety education, stairs    Recommendations for NURSING mobility: RW, gait belt SBA    Time:   Time in: 1226  Time out: 1245  Timed treatment minutes: 10  Total time: 19    Electronically signed by:    Rosalind Navarrete, PT  11/24/2022, 1:17 PM

## 2022-11-24 NOTE — PROGRESS NOTES
V2.0  [facility]      Name:  Andrew Steven /Age/Sex: 1950  (66 y.o. male)   MRN & CSN:  [id number] & [id number] Encounter Date/Time: 2022 11:11 AM EST    [facility]:  [facility] PCP: Uday Mireles MD       Hospital Day: 3    Assessment and Plan:   Andrew Steven is a 66 y.o. male with pmh of active tobacco use, COPD, hypertension, nonobstructive CAD and history of bladder cancer prostate cancer and renal cancer s/p prostatectomy and nephrectomy who presents with Acute respiratory failure with hypoxia ([facility] [address]      # Acute respiratory failure with hypoxia and hypercapnia secondary to COPD exacerbation, influenza A and active tobacco use  - Presented with worsening shortness of breath, initially chest x-ray showed small right pleural effusion, CT showed no PE, VBG showed pH 7.29, PCO2 71, PO2 39, HCO3 34, he was requiring Vapotherm, and repeat ABG better oxygenation, influenza positive, started on home Spiriva, Symbicort, IV steroids, duo nebs and on guaifenesin, consulted pulmonology, on 2 L of oxygen this morning, started on Tamiflu and azithromycin, as needed Vapotherm, continue to monitor and wean off O2, continue with incentive spirometry, PT/OT evaluation for dispo as patient still feels weak    # Hypertension, hyperlipidemia, nonischemic CAD with elevated BNP: Suspected fluid overload, consulted cardiology started on IV Lasix twice daily, strict ins/O's, daily weights, fluid and salt restriction, held home Maxide as patient has allergy, continue metoprolol, aspirin and statin, obtained echocardiogram, which showed EF 50 to 55%, mild AI, TR, RVSP 45 consistent with pulmonary hypertension, continue Lasix 40 mg daily, aspirin, atorvastatin, metoprolol    # Mild hyponatremia: Remained stable we will continue monitor    # History of bladder cancer s/p excision history of prostate cancer s/p prostatectomy, history of renal cancer status post right total nephrectomy 2018  Continue Flomax, creatinine 1.2-->1.0, monitor with diuresis        # Active tobacco use  Continue nicotine patch, lozenges as needed,Tobacco cessation advised    Diet ADULT DIET; Regular; Low Sodium (2 gm); 2000 ml   DVT Prophylaxis [x] Lovenox, []  Heparin, [] SCDs, [] Ambulation,  [] Eliquis, [] Xarelto  [] Coumadin   Code Status Full Code   Disposition From: Home  Expected Disposition: Home  Estimated Date of Discharge: TBD  Patient requires continued admission due to respiratory failure and influenza   Surrogate Decision Maker/ POA Wife     Subjective:     Chief Complaint: Shortness of Breath (Started yesterday. Was taken off his \"water pill\" yesterday. /) and Cough       Patient seen and examined at bedside. Patient states he feels little better but still short of breath requiring oxygen denies any other complaints         Review of Systems:    Review of Systems    Constitutional: No fever no chills  HEENT: No headache no dizziness  Cardiovascular: No chest pain no palpitations  Respiratory: cough shortness of breath  Abdomen: No diarrhea, no nausea, no vomiting, no abdominal pain  Musculoskeletal: No swelling  Neuro: No numbness or tingling  Skin: No rash      Objective: Intake/Output Summary (Last 24 hours) at 11/24/2022 1017  Last data filed at 11/24/2022 0959  Gross per 24 hour   Intake 5 ml   Output 800 ml   Net -795 ml          Vitals:   Vitals:    11/24/22 0951   BP: 125/76   Pulse: 80   Resp: 16   Temp: 98 °F (36.7 °C)   SpO2: 93%       Physical Exam:     General: Afebrile, no distress but requiring 1-2 L of oxygen  Eyes: EOMI  ENT: neck supple no JVD  Cardiovascular: S1-S2 normal no murmur  Respiratory: Air entry good bilaterally harsh breath sounds no wheezing  Gastrointestinal: Soft, non tender bowel sounds normal  Genitourinary: no suprapubic tenderness  Musculoskeletal: No edema  Skin: warm, dry  Neuro: Alert. Oriented no focal deficit  Psych: Mood appropriate.      Medications: Medications:    oseltamivir  75 mg Oral BID    azithromycin  500 mg Oral Daily    furosemide  40 mg Oral Daily    acetaminophen  650 mg Oral Q6H    aspirin  81 mg Oral Daily    atorvastatin  40 mg Oral Nightly    budesonide-formoterol  2 puff Inhalation BID    metoprolol tartrate  25 mg Oral BID    pantoprazole  40 mg Oral Daily    tiotropium  2 puff Inhalation Daily    tamsulosin  0.4 mg Oral Daily    sodium chloride flush  5-40 mL IntraVENous 2 times per day    enoxaparin  40 mg SubCUTAneous Daily    nicotine  1 patch TransDERmal Daily    [START ON 11/25/2022] predniSONE  40 mg Oral Daily      Infusions:    sodium chloride       PRN Meds: sodium chloride flush, 5-40 mL, PRN  sodium chloride, , PRN  ondansetron, 4 mg, Q8H PRN   Or  ondansetron, 4 mg, Q6H PRN  polyethylene glycol, 17 g, Daily PRN  acetaminophen, 650 mg, Q6H PRN   Or  acetaminophen, 650 mg, Q6H PRN  nicotine polacrilex, 2 mg, Q1H PRN  acetaminophen, 1,000 mg, Nightly PRN   And  diphenhydramine, 50 mg, Nightly PRN      Labs      Recent Results (from the past 24 hour(s))   Basic Metabolic Panel    Collection Time: 11/24/22  5:22 AM   Result Value Ref Range    Sodium 129 (L) 135 - 145 MMOL/L    Potassium 3.8 3.5 - 5.1 MMOL/L    Chloride 91 (L) 99 - 110 mMol/L    CO2 26 21 - 32 MMOL/L    Anion Gap 12 4 - 16    BUN 33 (H) 6 - 23 MG/DL    Creatinine 1.0 0.9 - 1.3 MG/DL    Est, Glom Filt Rate >60 >60 mL/min/1.73m2    Glucose 128 (H) 70 - 99 MG/DL    Calcium 9.1 8.3 - 10.6 MG/DL   Magnesium    Collection Time: 11/24/22  5:22 AM   Result Value Ref Range    Magnesium 2.0 1.8 - 2.4 mg/dl   Procalcitonin    Collection Time: 11/24/22  5:22 AM   Result Value Ref Range    Procalcitonin 0.059    CBC with Auto Differential    Collection Time: 11/24/22  5:22 AM   Result Value Ref Range    WBC 13.0 (H) 4.0 - 10.5 K/CU MM    RBC 4.07 (L) 4.6 - 6.2 M/CU MM    Hemoglobin 14.0 13.5 - 18.0 GM/DL    Hematocrit 40.4 (L) 42 - 52 %    MCV 99.3 78 - 100 FL    MCH 34.4 (H) 27 - 31 PG    MCHC 34.7 32.0 - 36.0 %    RDW 13.4 11.7 - 14.9 %    Platelets 873 836 - 369 K/CU MM    MPV 8.5 7.5 - 11.1 FL    Differential Type AUTOMATED DIFFERENTIAL     Segs Relative 89.6 (H) 36 - 66 %    Lymphocytes % 4.8 (L) 24 - 44 %    Monocytes % 4.9 (H) 0 - 4 %    Eosinophils % 0.0 0 - 3 %    Basophils % 0.2 0 - 1 %    Segs Absolute 11.7 K/CU MM    Lymphocytes Absolute 0.6 K/CU MM    Monocytes Absolute 0.6 K/CU MM    Eosinophils Absolute 0.0 K/CU MM    Basophils Absolute 0.0 K/CU MM    Nucleated RBC % 0.0 %    Total Nucleated RBC 0.0 K/CU MM    Total Immature Neutrophil 0.06 K/CU MM    Immature Neutrophil % 0.5 (H) 0 - 0.43 %        Imaging/Diagnostics Last 24 Hours   XR CHEST PORTABLE    Result Date: 11/22/2022  EXAMINATION: ONE XRAY VIEW OF THE CHEST 11/22/2022 12:00 pm COMPARISON: 12/11/2015. HISTORY: ORDERING SYSTEM PROVIDED HISTORY: SOB TECHNOLOGIST PROVIDED HISTORY: Reason for exam:->SOB Reason for Exam: SOB FINDINGS: The heart size is enlarged but unchanged. The pulmonary vasculature is within normal limits. There is blunting of the right costophrenic angle. There are old healed right rib fractures. No pneumothoraces are seen. 1. Small right pleural effusion. CT CHEST PULMONARY EMBOLISM W CONTRAST    Result Date: 11/22/2022  EXAMINATION: CTA OF THE CHEST 11/22/2022 2:47 pm TECHNIQUE: CTA of the chest was performed after the administration of intravenous contrast.  Multiplanar reformatted images are provided for review. MIP images are provided for review. Automated exposure control, iterative reconstruction, and/or weight based adjustment of the mA/kV was utilized to reduce the radiation dose to as low as reasonably achievable.  COMPARISON: Abdomen and pelvis CT, 01/24/2017 HISTORY: ORDERING SYSTEM PROVIDED HISTORY: dyspnea hx of renal cell ca TECHNOLOGIST PROVIDED HISTORY: Reason for exam:->dyspnea hx of renal cell ca Reason for Exam: dyspnea hx of renal cell ca FINDINGS: Pulmonary Arteries: The pulmonary arteries are adequately opacified. No filling defects are seen within the pulmonary arteries to suggest pulmonary embolism. Mediastinum: Visualized thyroid unremarkable. No mediastinal or hilar lymphadenopathy. Esophagus unremarkable. Cardiac chambers unremarkable. Thoracic aorta is at the upper limits of normal in caliber, but is otherwise unremarkable, without evidence of dissection. Lungs/pleura: Elevation of the right hemidiaphragm is seen, similar when compared to the previous exam.  Adjacent atelectasis is noted. An acute infiltrate is not identified. There is diffuse bronchial wall thickening. No filling defects are seen within the airways. All of these findings are seen superimposed on a background emphysema. Upper Abdomen: Limited images of the upper abdomen are unremarkable. Soft Tissues/Bones: Chronic right-sided rib fractures are noted. Multilevel chronic appearing compression fractures are seen within the thoracic spine. No paravertebral edema is identified. No acute bony abnormality identified. No evidence of pulmonary embolism or dissection. Bronchial wall thickening, which may be seen in inflammatory conditions such as bronchitis, reactive airways disease, pulmonary vascular congestion, and smoking.        Electronically signed by Nadeem Parker MD on 11/24/2022 at 10:17 AM

## 2022-11-24 NOTE — PROGRESS NOTES
Daily Progress Note  Subjective:  Awake and alert; feeling well  Denies any CP, SOB is stable  1L NC  Increase activity    Attending Note:      Impression and Plan:   Acute on Chronic HFpEF    EF 50-55% on echo    BNP elevated    On lasix and good UOP    Feeling better slowly    Med. Tx. For now     Positive for the flu-pulm treating  He recently stopped his diuretic OP d/t allergic reaction- likely contributed to fluid retention  Will follow     Most Recent Echo  11/22/22   Summary   Technically difficult examination due to poor acoustical windows from COPD   exacerbation. Left ventricular systolic function is normal.   Ejection fraction is visually estimated at 50-55%. Aortic valve leaflets are somewhat thickened. Mild aortic insufficiency with PHT of 593 msec. Mild tricuspid regurgitation; RVSP: 45 mmHg consistent with PTHN. No evidence of any pericardial effusion. PAST MEDICAL HISTORY:  History of having COPD present. He is tested  positive for influenza. Hypertension present. No stroke and no  seizures present. No MI present. History of bladder cancer, renal  cancer, and prostate cancer present. [facility]. PAST SURGICAL HISTORY:  Prostatectomy done, nephrectomy done, and  appendectomy done. SOCIAL HISTORY:  He still smokes. MEDICATIONS:  At home he is on his Lopressor 25 mg b.i.d. His  medication in the office, he is on aspirin, Lipitor, and metoprolol 25  mg b.i.d. ALLERGIES:  LISINOPRIL, which makes him cough.   Objective:   /76   Pulse 80   Temp 98 °F (36.7 °C) (Oral)   Resp 16   Ht 6' 2\" (1.88 m)   Wt 182 lb 8 oz (82.8 kg)   SpO2 93%   BMI 23.43 kg/m²     Intake/Output Summary (Last 24 hours) at 11/24/2022 1121  Last data filed at 11/24/2022 0959  Gross per 24 hour   Intake 5 ml   Output 800 ml   Net -795 ml       Medications:   Scheduled Meds:   oseltamivir  75 mg Oral BID    azithromycin  500 mg Oral Daily    furosemide  40 mg Oral Daily    acetaminophen  650 mg Oral Q6H    aspirin  81 mg Oral Daily    atorvastatin  40 mg Oral Nightly    budesonide-formoterol  2 puff Inhalation BID    metoprolol tartrate  25 mg Oral BID    pantoprazole  40 mg Oral Daily    tiotropium  2 puff Inhalation Daily    tamsulosin  0.4 mg Oral Daily    sodium chloride flush  5-40 mL IntraVENous 2 times per day    enoxaparin  40 mg SubCUTAneous Daily    nicotine  1 patch TransDERmal Daily    [START ON 11/25/2022] predniSONE  40 mg Oral Daily      Infusions:   sodium chloride        PRN Meds:  sodium chloride flush, sodium chloride, ondansetron **OR** ondansetron, polyethylene glycol, acetaminophen **OR** acetaminophen, nicotine polacrilex, acetaminophen **AND** diphenhydramine     Physical Exam:  Vitals:    11/24/22 0951   BP: 125/76   Pulse: 80   Resp: 16   Temp: 98 °F (36.7 °C)   SpO2: 93%        General: AAO, NAD  Chest: Nontender  Cardiac: First and Second Heart Sounds are Normal, No Murmurs or Gallops noted  Lungs:Clear to auscultation and percussion. Abdomen: Soft, NT, ND, +BS  Extremities: No clubbing, no edema  Vascular:  Equal 2+ peripheral pulses. Lab Data:  CBC:   Recent Labs     11/22/22  1128 11/23/22  0556 11/24/22 0522   WBC 6.5 5.7 13.0*   HGB 14.1 13.7 14.0   HCT 42.7 40.7* 40.4*   .4* 100.7* 99.3    231 249     BMP:   Recent Labs     11/22/22  1128 11/23/22  0556 11/24/22  0522   * 129* 129*   K 4.1 4.4 3.8   CL 90* 90* 91*   CO2 28 28 26   BUN 15 20 33*   CREATININE 1.2 1.2 1.0     LIVER PROFILE:   Recent Labs     11/22/22  1128   AST 22   ALT 19   BILITOT 0.7   ALKPHOS 121     PT/INR: No results for input(s): PROTIME, INR in the last 72 hours. APTT: No results for input(s): APTT in the last 72 hours. BNP:  No results for input(s): BNP in the last 72 hours.       Assessment:  Patient Active Problem List    Diagnosis Date Noted    Acute respiratory failure with hypoxia ([facility] [address] 11/22/2022 Hordeolum externum of right lower eyelid 10/07/2017    Malignant tumor of kidney ([facility] [address] 10/07/2017    Urinary frequency 06/06/2017    GERD (gastroesophageal reflux disease) 06/06/2017    Alcohol dependence ([facility] [address] 12/11/2015    Prostate cancer ([facility] [address] 11/30/2015    History of MI (myocardial infarction) 07/23/2015    CAD (coronary artery disease)     Tobacco use 08/04/2014    ED (erectile dysfunction) 08/04/2014    Bladder cancer ([facility] [address] 07/07/2014    Malignant neoplasm of lateral wall of urinary bladder ([facility] [address] 06/30/2014    HTN (hypertension) 06/09/2014    Hyperlipidemia with target LDL less than 100 06/09/2014    COPD, severe ([facility] [address] 11/20/2013       Electronically signed by TERESITA Mejia CNP on 11/24/2022 at 11:21 AM  Patient seen and examined agree with above assessment and plan Electronically signed by Bebe James MD on 11/24/2022 at 11:56 AM

## 2022-11-24 NOTE — PROGRESS NOTES
Pulmonary and Critical Care  Progress Note      VITALS:  /76   Pulse 80   Temp 98 °F (36.7 °C) (Oral)   Resp 16   Ht 6' 2\" (1.88 m)   Wt 182 lb 8 oz (82.8 kg)   SpO2 93%   BMI 23.43 kg/m²     Subjective:   CHIEF COMPLAINT :SOB     HPI:                The patient is a 66 y.o. male is sitting in the bed.  He is in mild resp distress    Objective:   PHYSICAL EXAM:    LUNGS:Occasional exp wheeze  Abd-soft, BS+,NT  Ext- no pedal edema  CVS-s1s2, no murmurs      DATA:    CBC:  Recent Labs     11/22/22  1128 11/23/22  0556 11/24/22 0522   WBC 6.5 5.7 13.0*   RBC 4.09* 4.04* 4.07*   HGB 14.1 13.7 14.0   HCT 42.7 40.7* 40.4*    231 249   .4* 100.7* 99.3   MCH 34.5* 33.9* 34.4*   MCHC 33.0 33.7 34.7   RDW 14.2 13.7 13.4   SEGSPCT 78.0* 85.2* 89.6*      BMP:  Recent Labs     11/22/22  1128 11/23/22  0556 11/24/22 0522   * 129* 129*   K 4.1 4.4 3.8   CL 90* 90* 91*   CO2 28 28 26   BUN 15 20 33*   CREATININE 1.2 1.2 1.0   CALCIUM 9.7 9.0 9.1   GLUCOSE 94 124* 128*      ABG:  Recent Labs     11/22/22  1400   PH 7.44   PO2ART 73*   [id number] 41.0   O2SAT 92.8*     BNP  Lab Results   Component Value Date    BNP 31 03/22/2012      D-Dimer:  No results found for: [facility]   Radiology: None      Assessment/Plan     Patient Active Problem List    Diagnosis Date Noted    Acute respiratory failure with hypoxia ([facility] [address] 11/22/2022     Priority: Medium    Hordeolum externum of right lower eyelid 10/07/2017    Malignant tumor of kidney ([address] 10/07/2017    Urinary frequency 06/06/2017    GERD (gastroesophageal reflux disease) 06/06/2017    Alcohol dependence ([address] 12/11/2015    Prostate cancer ([facility] 11/30/2015    History of MI (myocardial infarction) 07/23/2015    CAD (coronary artery disease)      Overview Note:     Sees Dr. Geronimo Mercedes      Tobacco use 08/04/2014    ED (erectile dysfunction) 08/04/2014    Bladder cancer ([facility] 07/07/2014    Malignant neoplasm of lateral wall of urinary bladder ([facility] 06/30/2014 HTN (hypertension) 06/09/2014    Hyperlipidemia with target LDL less than 100 06/09/2014     Overview Note:     replace inactive diagnosis      COPD, severe ([facility] [address] 11/20/2013   Ac hypoxemic resp failure  Ac copd  Positive inf A  Small right pl effusion  Covid negative       Tamiflu  Abx  F/u C&S  Inhalers  ICS  OOB  Keep sats > 92%  Solumedrol  C/w present management    Electronically signed by Yvon Armstrong MD on 11/24/2022 at 11:19 AM

## 2022-11-25 VITALS
OXYGEN SATURATION: 89 % | DIASTOLIC BLOOD PRESSURE: 84 MMHG | HEART RATE: 74 BPM | TEMPERATURE: 98.1 F | RESPIRATION RATE: 21 BRPM | WEIGHT: 182.5 LBS | BODY MASS INDEX: 23.42 KG/M2 | HEIGHT: 74 IN | SYSTOLIC BLOOD PRESSURE: 141 MMHG

## 2022-11-25 LAB
ANION GAP SERPL CALCULATED.3IONS-SCNC: 9 MMOL/L (ref 4–16)
BASOPHILS ABSOLUTE: 0 K/CU MM
BASOPHILS RELATIVE PERCENT: 0.1 % (ref 0–1)
BUN BLDV-MCNC: 36 MG/DL (ref 6–23)
CALCIUM SERPL-MCNC: 9.1 MG/DL (ref 8.3–10.6)
CHLORIDE BLD-SCNC: 88 MMOL/L (ref 99–110)
CO2: 31 MMOL/L (ref 21–32)
CREAT SERPL-MCNC: 1.3 MG/DL (ref 0.9–1.3)
DIFFERENTIAL TYPE: ABNORMAL
EOSINOPHILS ABSOLUTE: 0 K/CU MM
EOSINOPHILS RELATIVE PERCENT: 0 % (ref 0–3)
GFR SERPL CREATININE-BSD FRML MDRD: 58 ML/MIN/1.73M2
GLUCOSE BLD-MCNC: 107 MG/DL (ref 70–99)
HCT VFR BLD CALC: 39 % (ref 42–52)
HEMOGLOBIN: 13.6 GM/DL (ref 13.5–18)
IMMATURE NEUTROPHIL %: 1.1 % (ref 0–0.43)
LEGIONELLA URINARY AG: NEGATIVE
LYMPHOCYTES ABSOLUTE: 1.2 K/CU MM
LYMPHOCYTES RELATIVE PERCENT: 7.3 % (ref 24–44)
MAGNESIUM: 1.8 MG/DL (ref 1.8–2.4)
MCH RBC QN AUTO: 34.7 PG (ref 27–31)
MCHC RBC AUTO-ENTMCNC: 34.9 % (ref 32–36)
MCV RBC AUTO: 99.5 FL (ref 78–100)
MONOCYTES ABSOLUTE: 1.2 K/CU MM
MONOCYTES RELATIVE PERCENT: 7.4 % (ref 0–4)
NUCLEATED RBC %: 0 %
PDW BLD-RTO: 13.6 % (ref 11.7–14.9)
PLATELET # BLD: 266 K/CU MM (ref 140–440)
PMV BLD AUTO: 8.6 FL (ref 7.5–11.1)
POTASSIUM SERPL-SCNC: 4 MMOL/L (ref 3.5–5.1)
PRO-BNP: 266 PG/ML
RBC # BLD: 3.92 M/CU MM (ref 4.6–6.2)
SEGMENTED NEUTROPHILS ABSOLUTE COUNT: 13.3 K/CU MM
SEGMENTED NEUTROPHILS RELATIVE PERCENT: 84.1 % (ref 36–66)
SODIUM BLD-SCNC: 128 MMOL/L (ref 135–145)
STREP PNEUMONIAE ANTIGEN: NORMAL
TOTAL IMMATURE NEUTOROPHIL: 0.18 K/CU MM
TOTAL NUCLEATED RBC: 0 K/CU MM
WBC # BLD: 15.8 K/CU MM (ref 4–10.5)

## 2022-11-25 PROCEDURE — 6370000000 HC RX 637 (ALT 250 FOR IP): Performed by: INTERNAL MEDICINE

## 2022-11-25 PROCEDURE — 6370000000 HC RX 637 (ALT 250 FOR IP): Performed by: NURSE PRACTITIONER

## 2022-11-25 PROCEDURE — 80048 BASIC METABOLIC PNL TOTAL CA: CPT

## 2022-11-25 PROCEDURE — 99232 SBSQ HOSP IP/OBS MODERATE 35: CPT | Performed by: INTERNAL MEDICINE

## 2022-11-25 PROCEDURE — 6360000002 HC RX W HCPCS: Performed by: NURSE PRACTITIONER

## 2022-11-25 PROCEDURE — 83735 ASSAY OF MAGNESIUM: CPT

## 2022-11-25 PROCEDURE — 36415 COLL VENOUS BLD VENIPUNCTURE: CPT

## 2022-11-25 PROCEDURE — 2580000003 HC RX 258: Performed by: NURSE PRACTITIONER

## 2022-11-25 PROCEDURE — 83880 ASSAY OF NATRIURETIC PEPTIDE: CPT

## 2022-11-25 PROCEDURE — 85025 COMPLETE CBC W/AUTO DIFF WBC: CPT

## 2022-11-25 PROCEDURE — 6370000000 HC RX 637 (ALT 250 FOR IP): Performed by: STUDENT IN AN ORGANIZED HEALTH CARE EDUCATION/TRAINING PROGRAM

## 2022-11-25 RX ORDER — PSEUDOEPHEDRINE HCL 30 MG
100 TABLET ORAL DAILY
Qty: 5 CAPSULE | Refills: 0 | Status: SHIPPED | OUTPATIENT
Start: 2022-11-25 | End: 2022-11-30

## 2022-11-25 RX ORDER — POLYETHYLENE GLYCOL 3350 17 G
2 POWDER IN PACKET (EA) ORAL
Qty: 100 EACH | Refills: 3 | Status: SHIPPED | OUTPATIENT
Start: 2022-11-25

## 2022-11-25 RX ORDER — OSELTAMIVIR PHOSPHATE 75 MG/1
75 CAPSULE ORAL 2 TIMES DAILY
Qty: 5 CAPSULE | Refills: 0 | Status: SHIPPED | OUTPATIENT
Start: 2022-11-25 | End: 2022-11-28

## 2022-11-25 RX ORDER — DOCUSATE SODIUM 100 MG/1
100 CAPSULE, LIQUID FILLED ORAL DAILY
Status: DISCONTINUED | OUTPATIENT
Start: 2022-11-25 | End: 2022-11-25 | Stop reason: HOSPADM

## 2022-11-25 RX ORDER — FUROSEMIDE 40 MG/1
40 TABLET ORAL DAILY
Qty: 60 TABLET | Refills: 3 | Status: SHIPPED | OUTPATIENT
Start: 2022-11-26

## 2022-11-25 RX ORDER — NICOTINE 21 MG/24HR
1 PATCH, TRANSDERMAL 24 HOURS TRANSDERMAL DAILY
Qty: 30 PATCH | Refills: 3 | Status: SHIPPED | OUTPATIENT
Start: 2022-11-26

## 2022-11-25 RX ADMIN — FUROSEMIDE 40 MG: 40 TABLET ORAL at 08:51

## 2022-11-25 RX ADMIN — AZITHROMYCIN MONOHYDRATE 500 MG: 250 TABLET ORAL at 08:51

## 2022-11-25 RX ADMIN — SODIUM CHLORIDE, PRESERVATIVE FREE 10 ML: 5 INJECTION INTRAVENOUS at 08:52

## 2022-11-25 RX ADMIN — OSELTAMIVIR PHOSPHATE 75 MG: 75 CAPSULE ORAL at 08:52

## 2022-11-25 RX ADMIN — TAMSULOSIN HYDROCHLORIDE 0.4 MG: 0.4 CAPSULE ORAL at 08:51

## 2022-11-25 RX ADMIN — PANTOPRAZOLE SODIUM 40 MG: 40 TABLET, DELAYED RELEASE ORAL at 08:51

## 2022-11-25 RX ADMIN — DOCUSATE SODIUM 100 MG: 100 CAPSULE, LIQUID FILLED ORAL at 11:11

## 2022-11-25 RX ADMIN — ACETAMINOPHEN 650 MG: 325 TABLET ORAL at 00:16

## 2022-11-25 RX ADMIN — PREDNISONE 40 MG: 20 TABLET ORAL at 08:51

## 2022-11-25 RX ADMIN — ENOXAPARIN SODIUM 40 MG: 100 INJECTION SUBCUTANEOUS at 08:53

## 2022-11-25 RX ADMIN — METOPROLOL TARTRATE 25 MG: 25 TABLET, FILM COATED ORAL at 08:51

## 2022-11-25 RX ADMIN — ASPIRIN 81 MG: 81 TABLET, COATED ORAL at 08:51

## 2022-11-25 NOTE — PROGRESS NOTES
Daily Progress Note  Subjective:    Awake and alert; feeling well  Denies any CP, SOB is stable  On RA now    Attending Note:      Impression and Plan:     Acute on Chronic HFpEF    EF 50-55% on echo    BNP normal now    On lasix and good UOP    Feeling better slowly    Med. Tx. For now     Noted Na is lower at 128 today, also BUN is up and chloride is low while BNP is normal- I think he might be dry, asked him to hold lasix for 1-2 days at home, will need repeat BMP OP in a few days- I discussed with [facility] team as he is getting D/C today already- they will put this in there DC summary and place orders  Positive for the flu-pulm treating  He recently stopped his diuretic OP d/t allergic reaction- likely contributed to fluid retention  F/u at office in 1-2 weeks     Most Recent Echo  11/22/22   Summary   Technically difficult examination due to poor acoustical windows from COPD   exacerbation. Left ventricular systolic function is normal.   Ejection fraction is visually estimated at 50-55%. Aortic valve leaflets are somewhat thickened. Mild aortic insufficiency with PHT of 593 msec. Mild tricuspid regurgitation; RVSP: 45 mmHg consistent with PTHN. No evidence of any pericardial effusion. PAST MEDICAL HISTORY:  History of having COPD present. He is tested  positive for influenza. Hypertension present. No stroke and no  seizures present. No MI present. History of bladder cancer, renal  cancer, and prostate cancer present. [facility]. PAST SURGICAL HISTORY:  Prostatectomy done, nephrectomy done, and  appendectomy done. SOCIAL HISTORY:  He still smokes. MEDICATIONS:  At home he is on his Lopressor 25 mg b.i.d.   His  medication in the office, he is on aspirin, Lipitor, and metoprolol 25  mg b.i.d.    Objective:   BP (!) 141/84   Pulse 74   Temp 98.1 °F (36.7 °C) (Oral)   Resp 21   Ht 6' 2\" (1.88 m)   Wt 182 lb 8 oz (82.8 kg)   SpO2 (!) 89%   BMI 23.43 kg/m²     Intake/Output Summary (Last 24 hours) at 11/25/2022 1219  Last data filed at 11/25/2022 0855  Gross per 24 hour   Intake 5 ml   Output 800 ml   Net -795 ml       Medications:   Scheduled Meds:   docusate sodium  100 mg Oral Daily    oseltamivir  75 mg Oral BID    furosemide  40 mg Oral Daily    acetaminophen  650 mg Oral Q6H    aspirin  81 mg Oral Daily    atorvastatin  40 mg Oral Nightly    budesonide-formoterol  2 puff Inhalation BID    metoprolol tartrate  25 mg Oral BID    pantoprazole  40 mg Oral Daily    tiotropium  2 puff Inhalation Daily    tamsulosin  0.4 mg Oral Daily    sodium chloride flush  5-40 mL IntraVENous 2 times per day    enoxaparin  40 mg SubCUTAneous Daily    nicotine  1 patch TransDERmal Daily    predniSONE  40 mg Oral Daily      Infusions:   sodium chloride        PRN Meds:  sodium chloride flush, sodium chloride, ondansetron **OR** ondansetron, polyethylene glycol, acetaminophen **OR** acetaminophen, nicotine polacrilex, acetaminophen **AND** diphenhydramine     Physical Exam:  Vitals:    11/25/22 0847   BP: (!) 141/84   Pulse: 74   Resp: 21   Temp: 98.1 °F (36.7 °C)   SpO2: (!) 89%        General: AAO, NAD  Chest: Nontender  Cardiac: First and Second Heart Sounds are Normal, No Murmurs or Gallops noted  Lungs:Clear to auscultation and percussion. Abdomen: Soft, NT, ND, +BS  Extremities: No clubbing, no edema  Vascular:  Equal 2+ peripheral pulses. Lab Data:  CBC:   Recent Labs     11/23/22  0556 11/24/22  0522 11/25/22  0404   WBC 5.7 13.0* 15.8*   HGB 13.7 14.0 13.6   HCT 40.7* 40.4* 39.0*   .7* 99.3 99.5    249 266     BMP:   Recent Labs     11/23/22  0556 11/24/22  0522 11/25/22  0404   * 129* 128*   K 4.4 3.8 4.0   CL 90* 91* 88*   CO2 28 26 31   BUN 20 33* 36*   CREATININE 1.2 1.0 1.3     LIVER PROFILE: No results for input(s): AST, ALT, LIPASE, BILIDIR, BILITOT, ALKPHOS in the last 72 hours.     Invalid input(s): AMYLASE,  ALB  PT/INR: No results for input(s): PROTIME, INR in the last 72 hours. APTT: No results for input(s): APTT in the last 72 hours. BNP:  No results for input(s): BNP in the last 72 hours.       Assessment:  Patient Active Problem List    Diagnosis Date Noted    Influenza A 11/24/2022    Acute respiratory failure with hypoxia ([address] 11/22/2022    Hordeolum externum of right lower eyelid 10/07/2017    Malignant tumor of kidney ([address] 10/07/2017    Urinary frequency 06/06/2017    GERD (gastroesophageal reflux disease) 06/06/2017    Alcohol dependence ([address] 12/11/2015    Prostate cancer ([address] 11/30/2015    History of MI (myocardial infarction) 07/23/2015    CAD (coronary artery disease)     Tobacco use 08/04/2014    ED (erectile dysfunction) 08/04/2014    Bladder cancer ([address] 07/07/2014    Malignant neoplasm of lateral wall of urinary bladder ([address] 06/30/2014    HTN (hypertension) 06/09/2014    Hyperlipidemia with target LDL less than 100 06/09/2014    COPD, severe ([address] 11/20/2013       Electronically signed by Tali Tanner PA-C on 11/25/2022 at 12:19 PM  Patient was seen and examined agree with above assessment and plan Electronically signed by Julia Lorenz MD on 11/27/2022 at 12:16 PM

## 2022-11-25 NOTE — PROGRESS NOTES
Pulmonary and Critical Care  Progress Note      VITALS:  BP (!) 141/84   Pulse 74   Temp 98.1 °F (36.7 °C) (Oral)   Resp 21   Ht 6' 2\" (1.88 m)   Wt 182 lb 8 oz (82.8 kg)   SpO2 (!) 89%   BMI 23.43 kg/m²     Subjective:   CHIEF COMPLAINT :SOB     HPI:                The patient is a 66 y.o. male is sitting in the bed.  He is in mild resp distress    Objective:   PHYSICAL EXAM:    LUNGS:Occasional exp wheeze  Abd-soft, BS+,NT  Ext- no pedal edema  CVS-s1s2, no murmurs      DATA:    CBC:  Recent Labs     11/23/22  0556 11/24/22  0522 11/25/22  0404   WBC 5.7 13.0* 15.8*   RBC 4.04* 4.07* 3.92*   HGB 13.7 14.0 13.6   HCT 40.7* 40.4* 39.0*    249 266   .7* 99.3 99.5   MCH 33.9* 34.4* 34.7*   MCHC 33.7 34.7 34.9   RDW 13.7 13.4 13.6   SEGSPCT 85.2* 89.6* 84.1*      BMP:  Recent Labs     11/23/22  0556 11/24/22  0522 11/25/22  0404   * 129* 128*   K 4.4 3.8 4.0   CL 90* 91* 88*   CO2 28 26 31   BUN 20 33* 36*   CREATININE 1.2 1.0 1.3   CALCIUM 9.0 9.1 9.1   GLUCOSE 124* 128* 107*      ABG:  Recent Labs     11/22/22  1400   PH 7.44   PO2ART 73*   VOR1MPW 41.0   O2SAT 92.8*     BNP  Lab Results   Component Value Date    BNP 31 03/22/2012      D-Dimer:  No results found for: [facility]   Radiology: None      Assessment/Plan     Patient Active Problem List    Diagnosis Date Noted    Influenza A 11/24/2022     Priority: Medium    Acute respiratory failure with hypoxia ([facility] 11/22/2022     Priority: Medium    Hordeolum externum of right lower eyelid 10/07/2017    Malignant tumor of kidney ([facility] 10/07/2017    Urinary frequency 06/06/2017    GERD (gastroesophageal reflux disease) 06/06/2017    Alcohol dependence ([facility] 12/11/2015    Prostate cancer ([facility] 11/30/2015    History of MI (myocardial infarction) 07/23/2015    CAD (coronary artery disease)      Overview Note:     Sees Dr. Amari Walker      Tobacco use 08/04/2014    ED (erectile dysfunction) 08/04/2014    Bladder cancer ([facility] 07/07/2014    Malignant neoplasm of lateral wall of urinary bladder ([facility] 06/30/2014    HTN (hypertension) 06/09/2014    Hyperlipidemia with target LDL less than 100 06/09/2014     Overview Note:     replace inactive diagnosis      COPD, severe ([facility] 11/20/2013   Ac hypoxemic resp failure  Ac copd  Positive inf A  Small right pl effusion  Covid negative       Abx  Tamiflu  Inhalers  Prednisone taper  ICS  OOB  Keep sats >92%  C.w present management    Electronically signed by Kiran Fofana MD on 11/25/2022 at 9:26 AM

## 2022-11-25 NOTE — PLAN OF CARE
Problem: Safety - Adult  Goal: Free from fall injury  Outcome: Progressing     Problem: ABCDS Injury Assessment  Goal: Absence of physical injury  Outcome: Progressing     Problem: Pain  Goal: Verbalizes/displays adequate comfort level or baseline comfort level  Outcome: Progressing     Problem: Discharge Planning  Goal: Discharge to home or other facility with appropriate resources  Outcome: Progressing

## 2022-11-25 NOTE — PROGRESS NOTES
Discharge instructions reviewed with patient, questions answered. Patient denies further needs, transported home with family.

## 2022-11-25 NOTE — DISCHARGE SUMMARY
V2.0  Discharge Summary    Name:  Jt Lopez /Age/Sex: 1950 (66 y.o. male)   Admit Date: 2022  Discharge Date: 22    MRN & CSN:  [id number] & [id number] Encounter Date and Time 22 10:36 AM EST    Attending:  Carlo Talavera MD Discharging Provider: Carlo Talavera MD       Hospital Course:     Brief HPI: Jt Lopez is a 66 y.o. male with pmh of active tobacco use, COPD, hypertension, nonobstructive CAD and history of bladder cancer prostate cancer and renal cancer s/p prostatectomy and nephrectomy who presents with Acute respiratory failure with hypoxia ([address]    Brief Problem Based Course:   # Acute respiratory failure with hypoxia and hypercapnia secondary to COPD exacerbation, influenza A and active tobacco use  - Presented with worsening shortness of breath, initially chest x-ray showed small right pleural effusion, CT showed no PE, VBG showed pH 7.29, PCO2 71, PO2 39, HCO3 34, he was requiring Vapotherm, and repeat ABG better oxygenation, influenza positive, started on home Spiriva, Symbicort, IV steroids, duo nebs and on guaifenesin, started on Tamiflu and azithromycin, as needed Vapotherm,  continue with incentive spirometry, weaned off O2, PTevaluated and recommended home outpatient PT. discharged patient in a stable condition to continue with Tamiflu until it finish and follow-up with PCP.      # Hypertension, hyperlipidemia, nonischemic CAD with elevated BNP: Suspected fluid overload, consulted cardiology started on IV Lasix twice daily, strict ins/O's, daily weights, fluid and salt restriction, held home Maxide as patient has allergy, continue metoprolol, aspirin and statin, obtained echocardiogram, which showed EF 50 to 55%, mild AI, TR, RVSP 45 consistent with pulmonary hypertension, continue Lasix 40 mg daily, aspirin, atorvastatin, metoprolol     # Mild hyponatremia: Stable, will hold Lasix for 2 days and repeat CMP in 1 week and follow-up with PCP and cardiology in 1 to 2 weeks     # History of bladder cancer s/p excision history of prostate cancer s/p prostatectomy, history of renal cancer status post right total nephrectomy 2018  Continue Flomax, creatinine 1.2-->1.0,         # Active tobacco use  Continue nicotine patch, lozenges as needed,Tobacco cessation advised    Discharged patient in a stable condition to follow-up with PCP, cardiology      The patient expressed appropriate understanding of, and agreement with the discharge recommendations, medications, and plan.      Consults this admission:  IP CONSULT TO CARDIOLOGY  IP CONSULT TO DIETITIAN  IP CONSULT TO PULMONOLOGY    Discharge Diagnosis:   Acute respiratory failure with hypoxia ([address]  Influenza A  COPD exacerbation  Hypertension  Hyperlipidemia  Nonischemic CAD  Volume overload  Mild hyponatremia  Active tobacco use    Discharge Instruction:   Follow up appointments: PCP, cardiology  Primary care physician: Constantine Alonso MD within 2 weeks  Diet: regular diet and cardiac diet   Activity: activity as tolerated  Disposition: Discharged to:   [x]Home, [][facility], []SNF, []Acute Rehab, []Hospice   Condition on discharge: Stable  Labs and Tests to be Followed up as an outpatient by PCP or Specialist:     Discharge Medications:        Medication List        START taking these medications      docusate 100 MG Caps  Commonly known as: COLACE, DULCOLAX  Take 100 mg by mouth daily for 5 days     furosemide 40 MG tablet  Commonly known as: LASIX  Take 1 tablet by mouth daily  Start taking on: November 26, 2022     nicotine 21 MG/24HR  Commonly known as: [id number] [facility] 1 patch onto the skin daily  Start taking on: November 26, 2022     nicotine polacrilex 2 MG lozenge  Commonly known as: COMMIT  Take 1 lozenge by mouth every hour as needed for Smoking cessation     oseltamivir 75 MG capsule  Commonly known as: TAMIFLU  Take 1 capsule by mouth 2 times daily for 5 doses            CHANGE how you take these medications      atorvastatin 40 MG tablet  Commonly known as: LIPITOR  Take 1 tablet by mouth daily  What changed: when to take this     metoprolol tartrate 25 MG tablet  Commonly known as: LOPRESSOR  What changed: Another medication with the same name was removed. Continue taking this medication, and follow the directions you see here. CONTINUE taking these medications      acetaminophen 500 MG tablet  Commonly known as: TYLENOL     aspirin 81 MG EC tablet  Take 1 tablet by mouth daily     fluticasone-salmeterol 250-50 MCG/DOSE Aepb  Commonly known as: ADVAIR     pantoprazole 40 MG tablet  Commonly known as: PROTONIX  TAKE 1 TABLET BY MOUTH DAILY     predniSONE 10 MG tablet  Commonly known as: DELTASONE     Spiriva HandiHaler 18 MCG inhalation capsule  Generic drug: tiotropium  INHALE THE CONTENTS OF 1 CAPSULE INTO THE LUNGS DAILY     tamsulosin 0.4 MG capsule  Commonly known as: FLOMAX     terbinafine 1 % cream  Commonly known as: LAMISIL  Apply topically 2 times daily.             STOP taking these medications      triamterene-hydroCHLOROthiazide 37.5-25 MG per tablet  Commonly known as: Maxzide-25     Tylenol PM Extra Strength  MG tablet  Generic drug: diphenhydrAMINE-APAP (sleep)               Where to Get Your Medications        These medications were sent to [facility]/pharmacy #[id number] [address] [place] [address]. - P [phone] - F [phone] [address] [address]      Phone: [phone]   docusate 100 MG Caps  furosemide 40 MG tablet  nicotine 21 MG/24HR  nicotine polacrilex 2 MG lozenge  oseltamivir 75 MG capsule        Objective Findings at Discharge:   BP (!) 141/84   Pulse 74   Temp 98.1 °F (36.7 °C) (Oral)   Resp 21   Ht 6' 2\" (1.88 m)   Wt 182 lb 8 oz (82.8 kg)   SpO2 (!) 89%   BMI 23.43 kg/m²       Physical Exam:   General: Afebrile, no distress not requiring oxygen this morning  Eyes: EOMI  ENT: neck supple, no JVD  Cardiovascular: S1-S2 normal no murmur  Respiratory: Air entry good bilaterally no wheezing no crackles  Gastrointestinal: Soft, non tender bowel sounds normal  Genitourinary: no suprapubic tenderness  Musculoskeletal: No edema  Skin: warm, dry  Neuro: Alert. Oriented no focal deficit  Psych: Mood appropriate. Labs and Imaging   XR CHEST PORTABLE    Result Date: 11/22/2022  EXAMINATION: ONE XRAY VIEW OF THE CHEST 11/22/2022 12:00 pm COMPARISON: 12/11/2015. HISTORY: ORDERING SYSTEM PROVIDED HISTORY: SOB TECHNOLOGIST PROVIDED HISTORY: Reason for exam:->SOB Reason for Exam: SOB FINDINGS: The heart size is enlarged but unchanged. The pulmonary vasculature is within normal limits. There is blunting of the right costophrenic angle. There are old healed right rib fractures. No pneumothoraces are seen. 1. Small right pleural effusion. CT CHEST PULMONARY EMBOLISM W CONTRAST    Result Date: 11/22/2022  EXAMINATION: CTA OF THE CHEST 11/22/2022 2:47 pm TECHNIQUE: CTA of the chest was performed after the administration of intravenous contrast.  Multiplanar reformatted images are provided for review. MIP images are provided for review. Automated exposure control, iterative reconstruction, and/or weight based adjustment of the mA/kV was utilized to reduce the radiation dose to as low as reasonably achievable. COMPARISON: Abdomen and pelvis CT, 01/24/2017 HISTORY: ORDERING SYSTEM PROVIDED HISTORY: dyspnea hx of renal cell ca TECHNOLOGIST PROVIDED HISTORY: Reason for exam:->dyspnea hx of renal cell ca Reason for Exam: dyspnea hx of renal cell ca FINDINGS: Pulmonary Arteries: The pulmonary arteries are adequately opacified. No filling defects are seen within the pulmonary arteries to suggest pulmonary embolism. Mediastinum: Visualized thyroid unremarkable. No mediastinal or hilar lymphadenopathy. Esophagus unremarkable. Cardiac chambers unremarkable.   Thoracic aorta is at the upper limits of normal in caliber, but is otherwise unremarkable, without evidence of dissection. Lungs/pleura: Elevation of the right hemidiaphragm is seen, similar when compared to the previous exam.  Adjacent atelectasis is noted. An acute infiltrate is not identified. There is diffuse bronchial wall thickening. No filling defects are seen within the airways. All of these findings are seen superimposed on a background emphysema. Upper Abdomen: Limited images of the upper abdomen are unremarkable. Soft Tissues/Bones: Chronic right-sided rib fractures are noted. Multilevel chronic appearing compression fractures are seen within the thoracic spine. No paravertebral edema is identified. No acute bony abnormality identified. No evidence of pulmonary embolism or dissection. Bronchial wall thickening, which may be seen in inflammatory conditions such as bronchitis, reactive airways disease, pulmonary vascular congestion, and smoking. CBC:   Recent Labs     11/23/22  0556 11/24/22  0522 11/25/22  0404   WBC 5.7 13.0* 15.8*   HGB 13.7 14.0 13.6    249 266     BMP:    Recent Labs     11/23/22  0556 11/24/22  0522 11/25/22  0404   * 129* 128*   K 4.4 3.8 4.0   CL 90* 91* 88*   CO2 28 26 31   BUN 20 33* 36*   CREATININE 1.2 1.0 1.3   GLUCOSE 124* 128* 107*     Hepatic:   Recent Labs     11/22/22  1128   AST 22   ALT 19   BILITOT 0.7   ALKPHOS 121     Lipids:   Lab Results   Component Value Date/Time    CHOL 156 11/23/2022 05:56 AM    CHOL 171 06/09/2014 11:45 AM    HDL 85 11/23/2022 05:56 AM    TRIG 82 11/23/2022 05:56 AM     Hemoglobin A1C:   Lab Results   Component Value Date/Time    LABA1C 4.4 12/09/2021 03:28 PM     TSH: No results found for: TSH  Troponin:   Lab Results   Component Value Date/Time    TROPONINT <0.010 11/22/2022 11:28 AM     Lactic Acid: No results for input(s): LACTA in the last 72 hours.   BNP:   Recent Labs     11/22/22  1128 11/25/22  0404   PROBNP 1,212* 266.0     UA:  Lab Results   Component Value Date/Time    NITRU NEGATIVE 03/17/2022 01:47 PM    NITRU Positive 03/28/2018 12:00 AM    NITRU NEGATIVE 08/12/2011 02:00 PM    COLORU YELLOW 03/17/2022 01:47 PM    PHUR 6.5 03/28/2018 12:00 AM    WBCUA 132 03/17/2022 01:47 PM    RBCUA NONE SEEN 03/17/2022 01:47 PM    MUCUS FEW 03/22/2012 05:25 PM    TRICHOMONAS NONE SEEN 03/17/2022 01:47 PM    BACTERIA FEW 03/17/2022 01:47 PM    CLARITYU SLIGHTLY CLOUDY 03/17/2022 01:47 PM    SPECGRAV 1.010 03/17/2022 01:47 PM    LEUKOCYTESUR NEGATIVE 03/17/2022 01:47 PM    UROBILINOGEN 0.2 03/17/2022 01:47 PM    BILIRUBINUR NEGATIVE 03/17/2022 01:47 PM    BILIRUBINUR negative 06/09/2014 11:45 AM    BLOODU SMALL 03/17/2022 01:47 PM    GLUCOSEU Negative 03/28/2018 12:00 AM    KETUA NEGATIVE 03/17/2022 01:47 PM     Urine Cultures: No results found for: LABURIN  Blood Cultures: No results found for: BC  No results found for: BLOODCULT2  Organism: No results found for: ORG    Time Spent Discharging patient 33 minutes    Electronically signed by Christian Draper MD on 11/25/2022 at 10:36 AM

## 2022-11-25 NOTE — DISCHARGE INSTRUCTIONS
hold Lasix for 2 days and repeat CMP in 1 week and follow-up with PCP and cardiology in 1 to 2 weeks

## 2022-11-28 ENCOUNTER — TELEPHONE (OUTPATIENT)
Dept: FAMILY MEDICINE CLINIC | Age: 72
End: 2022-11-28

## 2022-11-28 NOTE — TELEPHONE ENCOUNTER
Care Transitions Initial Follow Up Call    Outreach made within 2 business days of discharge: Yes    Patient: Eliceo Segura Patient : 1950   MRN: [id number]  Reason for Admission: There are no discharge diagnoses documented for the most recent discharge. Discharge Date: 22       Spoke with: patient and spouse    Discharge department/facility: [facility]    TCM Interactive Patient Contact:  Was patient able to fill all prescriptions: Yes  Was patient instructed to bring all medications to the follow-up visit: Yes  Is patient taking all medications as directed in the discharge summary?  Yes  Does patient understand their discharge instructions: Yes  Does patient have questions or concerns that need addressed prior to 7-14 day follow up office visit: no    Scheduled appointment with PCP within 7-14 days    Follow Up  Future Appointments   Date Time Provider Parveen Zelaya   2022  2:00 PM Melinda Grove, [address] [facility]   2023  1:15 PM Francesca Estrada MD [facility] [address]

## 2022-12-05 ENCOUNTER — OFFICE VISIT (OUTPATIENT)
Dept: INTERNAL MEDICINE CLINIC | Age: 72
End: 2022-12-05

## 2022-12-05 ENCOUNTER — HOSPITAL ENCOUNTER (OUTPATIENT)
Age: 72
Discharge: HOME | End: 2022-12-05
Payer: MEDICARE

## 2022-12-05 VITALS — HEART RATE: 76 BPM | DIASTOLIC BLOOD PRESSURE: 68 MMHG | SYSTOLIC BLOOD PRESSURE: 120 MMHG | OXYGEN SATURATION: 95 %

## 2022-12-05 DIAGNOSIS — Z85.46 HISTORY OF PROSTATE CANCER: ICD-10-CM

## 2022-12-05 DIAGNOSIS — E87.1 HYPONATREMIA: ICD-10-CM

## 2022-12-05 DIAGNOSIS — F17.200 NICOTINE DEPENDENCE, UNCOMPLICATED, UNSPECIFIED NICOTINE PRODUCT TYPE: ICD-10-CM

## 2022-12-05 DIAGNOSIS — D72.829 LEUKOCYTOSIS, UNSPECIFIED TYPE: ICD-10-CM

## 2022-12-05 DIAGNOSIS — Z09 HOSPITAL DISCHARGE FOLLOW-UP: Primary | ICD-10-CM

## 2022-12-05 LAB
ALBUMIN SERPL-MCNC: 3.8 GM/DL (ref 3.4–5)
ALP BLD-CCNC: 110 IU/L (ref 40–129)
ALT SERPL-CCNC: 21 U/L (ref 10–40)
ANION GAP SERPL CALCULATED.3IONS-SCNC: 11 MMOL/L (ref 4–16)
AST SERPL-CCNC: 19 IU/L (ref 15–37)
BASOPHILS ABSOLUTE: 0 K/CU MM
BASOPHILS RELATIVE PERCENT: 0.2 % (ref 0–1)
BILIRUB SERPL-MCNC: 0.6 MG/DL (ref 0–1)
BUN BLDV-MCNC: 32 MG/DL (ref 6–23)
CALCIUM SERPL-MCNC: 9.8 MG/DL (ref 8.3–10.6)
CHLORIDE BLD-SCNC: 97 MMOL/L (ref 99–110)
CO2: 25 MMOL/L (ref 21–32)
CREAT SERPL-MCNC: 1.2 MG/DL (ref 0.9–1.3)
DIFFERENTIAL TYPE: ABNORMAL
EOSINOPHILS ABSOLUTE: 0 K/CU MM
EOSINOPHILS RELATIVE PERCENT: 0.1 % (ref 0–3)
GFR SERPL CREATININE-BSD FRML MDRD: >60 ML/MIN/1.73M2
GLUCOSE FASTING: 111 MG/DL (ref 70–99)
HCT VFR BLD CALC: 39.8 % (ref 42–52)
HEMOGLOBIN: 13.6 GM/DL (ref 13.5–18)
IMMATURE NEUTROPHIL %: 0.6 % (ref 0–0.43)
LYMPHOCYTES ABSOLUTE: 0.7 K/CU MM
LYMPHOCYTES RELATIVE PERCENT: 6.4 % (ref 24–44)
MCH RBC QN AUTO: 34.5 PG (ref 27–31)
MCHC RBC AUTO-ENTMCNC: 34.2 % (ref 32–36)
MCV RBC AUTO: 101 FL (ref 78–100)
MONOCYTES ABSOLUTE: 0.5 K/CU MM
MONOCYTES RELATIVE PERCENT: 4.2 % (ref 0–4)
PDW BLD-RTO: 13.3 % (ref 11.7–14.9)
PLATELET # BLD: 348 K/CU MM (ref 140–440)
PMV BLD AUTO: 8.1 FL (ref 7.5–11.1)
POTASSIUM SERPL-SCNC: 4.3 MMOL/L (ref 3.5–5.1)
PROSTATE SPECIFIC ANTIGEN: 0.02 NG/ML (ref 0–4)
RBC # BLD: 3.94 M/CU MM (ref 4.6–6.2)
SEGMENTED NEUTROPHILS ABSOLUTE COUNT: 9.7 K/CU MM
SEGMENTED NEUTROPHILS RELATIVE PERCENT: 88.5 % (ref 36–66)
SODIUM BLD-SCNC: 133 MMOL/L (ref 135–145)
TOTAL IMMATURE NEUTOROPHIL: 0.07 K/CU MM
TOTAL PROTEIN: 7.1 GM/DL (ref 6.4–8.2)
WBC # BLD: 11 K/CU MM (ref 4–10.5)

## 2022-12-05 PROCEDURE — G0103 PSA SCREENING: HCPCS

## 2022-12-05 PROCEDURE — 85025 COMPLETE CBC W/AUTO DIFF WBC: CPT

## 2022-12-05 PROCEDURE — 80053 COMPREHEN METABOLIC PANEL: CPT

## 2022-12-05 PROCEDURE — 36415 COLL VENOUS BLD VENIPUNCTURE: CPT

## 2022-12-05 RX ORDER — NICOTINE 21 MG/24HR
1 PATCH, TRANSDERMAL 24 HOURS TRANSDERMAL DAILY
Qty: 42 PATCH | Refills: 0 | Status: SHIPPED | OUTPATIENT
Start: 2022-12-05 | End: 2023-01-16

## 2022-12-05 ASSESSMENT — ENCOUNTER SYMPTOMS
BLOOD IN STOOL: 0
EYE DISCHARGE: 0
ABDOMINAL PAIN: 0
NAUSEA: 0
EYE PAIN: 0
SHORTNESS OF BREATH: 1
CONSTIPATION: 0
SORE THROAT: 0
DIARRHEA: 0
EYE REDNESS: 0
BACK PAIN: 0
WHEEZING: 0
VOMITING: 0
CHEST TIGHTNESS: 0
RHINORRHEA: 0
COLOR CHANGE: 0
PHOTOPHOBIA: 0

## 2022-12-05 NOTE — PROGRESS NOTES
Ham Swanson (:  1950) is a 66 y.o. male,Established patient, here for evaluation of the following chief complaint(s):    Follow-Up from Hospital    This is my first patient encounter with Ham Swanson; chart reviewed. SUBJECTIVE/OBJECTIVE:  LOBO Swanson is a pleasant 66 y.o. male presenting to clinic today for f/u. Patient was admitted from 2022 through 2022 for respiratory failure with hypoxia and hypercapnia secondary to influenza A/COPD exacerbation/ongoing tobacco use; patient also found to have nonischemic coronary artery disease with elevated BNP; echocardiogram showed EF of 50 to 55%; pulmonary hypertension; patient was started on Lasix, aspirin, atorvastatin, metoprolol via his cardiologist.  Patient was mildly hyponatremic; patient was advised to hold Lasix for 2 days and repeat CMP in 1 week. Patient reports doing generally well since discharge; reports that his exercise capacity has improved and he is feeling less short of breath overall; patient reports he is currently working on cutting back on smoking and has cut back to about half pack per day; would like to initiate nicotine patches. Patient reports he has been taking his Lasix every other day as he feels like when he does take it he pees excessively; reports that he urinated 2 L yesterday and 8 hours. Patient states he is planning to call his cardiology office to schedule follow-up.     Allergies   Allergen Reactions    Lisinopril Other (See Comments)     Cough    Triamterene-Hydrochlorothiazide [Hydrochlorothiazide W-Triamterene] Rash       Current Outpatient Medications   Medication Sig Dispense Refill    nicotine (NICODERM CQ) 14 MG/24HR Place 1 patch onto the skin daily 42 patch 0    nicotine (NICODERM CQ) 7 MG/24HR Place 1 patch onto the skin daily for 14 days 14 patch 0    furosemide (LASIX) 40 MG tablet Take 1 tablet by mouth daily 60 tablet 3    nicotine (NICODERM CQ) 21 MG/24HR Place 1 patch onto the skin daily 30 patch 3    nicotine polacrilex (COMMIT) 2 MG lozenge Take 1 lozenge by mouth every hour as needed for Smoking cessation 100 each 3    predniSONE (DELTASONE) 10 MG tablet Take by mouth See Admin Instructions 11/21/22 30 mg through 11/27/22 11/28/22 20 mg through 12/04/22 12/05/22 10 mg through 12/11/22      metoprolol tartrate (LOPRESSOR) 25 MG tablet Take 25 mg by mouth 2 times daily      fluticasone-salmeterol (ADVAIR) 250-50 MCG/DOSE AEPB Inhale 1 puff into the lungs every 12 hours      terbinafine (LAMISIL) 1 % cream Apply topically 2 times daily. 42 g 2    tamsulosin (FLOMAX) 0.4 MG capsule Take 0.4 mg by mouth daily      pantoprazole (PROTONIX) 40 MG tablet TAKE 1 TABLET BY MOUTH DAILY 90 tablet 1    SPIRIVA HANDIHALER 18 MCG inhalation capsule INHALE THE CONTENTS OF 1 CAPSULE INTO THE LUNGS DAILY 90 capsule 3    atorvastatin (LIPITOR) 40 MG tablet Take 1 tablet by mouth daily (Patient taking differently: Take 40 mg by mouth nightly) 30 tablet 0    aspirin 81 MG EC tablet Take 1 tablet by mouth daily 30 tablet 0    acetaminophen (TYLENOL) 500 MG tablet Take 1,000 mg by mouth in the morning and 1,000 mg at noon and 1,000 mg in the evening and 1,000 mg before bedtime. .       No current facility-administered medications for this visit. /68   Pulse 76   SpO2 95%     Review of Systems   Constitutional:  Negative for appetite change, chills, fatigue and fever. HENT:  Negative for congestion, ear pain, hearing loss, rhinorrhea and sore throat. Eyes:  Negative for photophobia, pain, discharge and redness. Respiratory:  Positive for shortness of breath. Negative for chest tightness and wheezing. Cardiovascular:  Negative for chest pain, palpitations and leg swelling. Gastrointestinal:  Negative for abdominal pain, blood in stool, constipation, diarrhea, nausea and vomiting. Endocrine: Negative for polyuria. Genitourinary:  Positive for frequency.  Negative for difficulty urinating, dysuria, flank pain, hematuria and urgency. Musculoskeletal:  Negative for arthralgias, back pain, gait problem and joint swelling. Skin:  Negative for color change and rash. Neurological:  Negative for dizziness, syncope, weakness, light-headedness and headaches. Hematological:  Negative for adenopathy. Psychiatric/Behavioral:  Negative for agitation, behavioral problems and suicidal ideas. The patient is not nervous/anxious. Physical Exam  Constitutional:       General: He is not in acute distress. Appearance: He is not ill-appearing, toxic-appearing or diaphoretic. HENT:      Head: Normocephalic and atraumatic. Right Ear: External ear normal.      Left Ear: External ear normal.   Cardiovascular:      Rate and Rhythm: Normal rate and regular rhythm. Pulses: Normal pulses. Pulmonary:      Effort: Pulmonary effort is normal. No respiratory distress. Breath sounds: Wheezing (faint wheezes throughout lung fields bilaterally) present. Musculoskeletal:         General: Normal range of motion. Cervical back: Normal range of motion. Right lower leg: No edema. Left lower leg: No edema. Skin:     General: Skin is warm and dry. Neurological:      General: No focal deficit present. Mental Status: He is alert and oriented to person, place, and time. Mental status is at baseline. Psychiatric:         Behavior: Behavior normal.       ASSESSMENT/PLAN:  1. Hospital discharge follow-up   -Patient's blood pressure is well controlled; will recheck labs to follow-up on hyponatremia; likely related to Lasix which patient has been taking every other day and has not taken since yesterday.   Recommend patient follow-up with his cardiologist as soon as possible for consideration of medication adjustments etc. Return to clinic or report to emergency department if symptoms worsen, change, persist.  -     ID DISCHARGE MEDS RECONCILED W/ CURRENT OUTPATIENT MED LIST  2. History of prostate cancer  -     PSA, Prostatic Specific Antigen; Future  3. Leukocytosis, unspecified type  -     CBC with Auto Differential; Future  4. Hyponatremia  -     Comprehensive Metabolic Panel; Future  5. Nicotine dependence, uncomplicated, unspecified nicotine product type  -     nicotine (NICODERM CQ) 14 MG/24HR; Place 1 patch onto the skin daily, Disp-42 patch, R-0Normal  -     nicotine (NICODERM CQ) 7 MG/24HR; Place 1 patch onto the skin daily for 14 days, Disp-14 patch, R-0Normal    Return in 1 month (on 1/5/2023), or if symptoms worsen or fail to improve, for Follow Up. An electronic signature was used to authenticate this note. --CHRISTOPHE Junior    Post-Discharge Transitional Care  Follow Up      Alexander Farmer   YOB: 1950    Date of Office Visit:  12/5/2022  Date of Hospital Admission: 11/22/22  Date of Hospital Discharge: 11/25/22  Risk of hospital readmission (high >=14%. Medium >=10%) :Readmission Risk Score: 14.6      Care management risk score Rising risk (score 2-5) and Complex Care (Scores >=6): No Risk Score On File     Non face to face  following discharge, date last encounter closed (first attempt may have been earlier): 11/28/2022    Call initiated 2 business days of discharge: Yes    ASSESSMENT/PLAN:   Hospital discharge follow-up  -     [place] DISCHARGE MEDS RECONCILED W/ CURRENT OUTPATIENT MED LIST  History of prostate cancer  -     PSA, Prostatic Specific Antigen; Future  Leukocytosis, unspecified type  -     CBC with Auto Differential; Future  Hyponatremia  -     Comprehensive Metabolic Panel; Future  Nicotine dependence, uncomplicated, unspecified nicotine product type  -     nicotine (NICODERM CQ) 14 MG/24HR; Place 1 patch onto the skin daily, Disp-42 patch, R-0Normal  -     nicotine (NICODERM CQ) 7 MG/24HR;  Place 1 patch onto the skin daily for 14 days, Disp-14 patch, R-0Normal    Medical Decision Making: moderate complexity  Return in 1 month (on 1/5/2023), or if symptoms worsen or fail to improve, for Follow Up. Subjective:   HPI:  Follow up of Hospital problems/diagnosis(es): Hyponatremia; Respiratory Failure    Inpatient course: Discharge summary reviewed- see chart. Interval history/Current status: Improved      Patient Active Problem List   Diagnosis    COPD, severe ([address]    HTN (hypertension)    Hyperlipidemia with target LDL less than 100    Malignant neoplasm of lateral wall of urinary bladder (HCC)    Bladder cancer (HCC)    Tobacco use    ED (erectile dysfunction)    CAD (coronary artery disease)    History of MI (myocardial infarction)    Prostate cancer ([address]    Alcohol dependence ([address]    Urinary frequency    GERD (gastroesophageal reflux disease)    Hordeolum externum of right lower eyelid    Malignant tumor of kidney ([address]    Acute respiratory failure with hypoxia ([facility]    Influenza A       Medications listed as ordered at the time of discharge from hospital     Medication List            Accurate as of December 5, 2022  2:37 PM. If you have any questions, ask your nurse or doctor. CHANGE how you take these medications      atorvastatin 40 MG tablet  Commonly known as: LIPITOR  Take 1 tablet by mouth daily  What changed: when to take this     * nicotine 21 MG/24HR  Commonly known as: NICODERM CQ  Place 1 patch onto the skin daily  What changed: Another medication with the same name was added. Make sure you understand how and when to take each. * nicotine 14 MG/24HR  Commonly known as: NICODERM CQ  Place 1 patch onto the skin daily  What changed: You were already taking a medication with the same name, and this prescription was added. Make sure you understand how and when to take each. * nicotine 7 MG/24HR  Commonly known as: NICODERM CQ  Place 1 patch onto the skin daily for 14 days  What changed:  You were already taking a medication with the same name, and this prescription was added. Make sure you understand how and when to take each. * This list has 3 medication(s) that are the same as other medications prescribed for you. Read the directions carefully, and ask your doctor or other care provider to review them with you. CONTINUE taking these medications      acetaminophen 500 MG tablet  Commonly known as: TYLENOL     aspirin 81 MG EC tablet  Take 1 tablet by mouth daily     fluticasone-salmeterol 250-50 MCG/DOSE Aepb  Commonly known as: ADVAIR     furosemide 40 MG tablet  Commonly known as: LASIX  Take 1 tablet by mouth daily     metoprolol tartrate 25 MG tablet  Commonly known as: LOPRESSOR     nicotine polacrilex 2 MG lozenge  Commonly known as: COMMIT  Take 1 lozenge by mouth every hour as needed for Smoking cessation     pantoprazole 40 MG tablet  Commonly known as: PROTONIX  TAKE 1 TABLET BY MOUTH DAILY     predniSONE 10 MG tablet  Commonly known as: DELTASONE     Spiriva HandiHaler 18 MCG inhalation capsule  Generic drug: tiotropium  INHALE THE CONTENTS OF 1 CAPSULE INTO THE LUNGS DAILY     tamsulosin 0.4 MG capsule  Commonly known as: FLOMAX     terbinafine 1 % cream  Commonly known as: LAMISIL  Apply topically 2 times daily.                Where to Get Your Medications        These medications were sent to [facility] [address] [phone] [address] [place] [address]      Phone: [phone]   nicotine 14 MG/24HR  nicotine 7 MG/24HR           Medications marked \"taking\" at this time  Outpatient Medications Marked as Taking for the 12/5/22 encounter (Office Visit) with CHRISTOPHE Damon   Medication Sig Dispense Refill    nicotine (NICODERM CQ) 14 MG/24HR Place 1 patch onto the skin daily 42 patch 0    nicotine (NICODERM CQ) 7 MG/24HR Place 1 patch onto the skin daily for 14 days 14 patch 0    furosemide (LASIX) 40 MG tablet Take 1 tablet by mouth daily 60 tablet 3    nicotine (NICODERM CQ) 21 MG/24HR Place 1 patch onto the skin daily 30 patch 3    nicotine polacrilex (COMMIT) 2 MG lozenge Take 1 lozenge by mouth every hour as needed for Smoking cessation 100 each 3    predniSONE (DELTASONE) 10 MG tablet Take by mouth See Admin Instructions 11/21/22 30 mg through 11/27/22 11/28/22 20 mg through 12/04/22 12/05/22 10 mg through 12/11/22      metoprolol tartrate (LOPRESSOR) 25 MG tablet Take 25 mg by mouth 2 times daily      fluticasone-salmeterol (ADVAIR) 250-50 MCG/DOSE AEPB Inhale 1 puff into the lungs every 12 hours      terbinafine (LAMISIL) 1 % cream Apply topically 2 times daily. 42 g 2    tamsulosin (FLOMAX) 0.4 MG capsule Take 0.4 mg by mouth daily      pantoprazole (PROTONIX) 40 MG tablet TAKE 1 TABLET BY MOUTH DAILY 90 tablet 1    SPIRIVA HANDIHALER 18 MCG inhalation capsule INHALE THE CONTENTS OF 1 CAPSULE INTO THE LUNGS DAILY 90 capsule 3    atorvastatin (LIPITOR) 40 MG tablet Take 1 tablet by mouth daily (Patient taking differently: Take 40 mg by mouth nightly) 30 tablet 0    aspirin 81 MG EC tablet Take 1 tablet by mouth daily 30 tablet 0    acetaminophen (TYLENOL) 500 MG tablet Take 1,000 mg by mouth in the morning and 1,000 mg at noon and 1,000 mg in the evening and 1,000 mg before bedtime. .          Medications patient taking as of now reconciled against medications ordered at time of hospital discharge: Yes        Objective:    /68   Pulse 76   SpO2 95%         An electronic signature was used to authenticate this note.   --CHRISTOPHE Ferrell

## 2022-12-06 NOTE — RESULT ENCOUNTER NOTE
Please call patient to advise of lab results; White count is improving and trending in the right direction;    Prostate lab is normal/very low.     Sodium level is still slightly low however has improved from previous; can continue with current regimen of Lasix/recommend patient follow-up with his cardiologist for consideration of further medication adjustments etc.

## 2022-12-24 PROBLEM — J10.1 INFLUENZA A: Status: RESOLVED | Noted: 2022-11-24 | Resolved: 2022-12-24

## 2023-02-21 ENCOUNTER — OFFICE VISIT (OUTPATIENT)
Dept: INTERNAL MEDICINE CLINIC | Age: 73
End: 2023-02-21
Payer: MEDICARE

## 2023-02-21 VITALS
BODY MASS INDEX: 25.66 KG/M2 | WEIGHT: 193.6 LBS | HEIGHT: 73 IN | DIASTOLIC BLOOD PRESSURE: 60 MMHG | RESPIRATION RATE: 18 BRPM | OXYGEN SATURATION: 98 % | SYSTOLIC BLOOD PRESSURE: 100 MMHG | HEART RATE: 48 BPM

## 2023-02-21 DIAGNOSIS — L12.0 BULLOUS PEMPHIGOID: Primary | ICD-10-CM

## 2023-02-21 DIAGNOSIS — R73.9 HYPERGLYCEMIA: ICD-10-CM

## 2023-02-21 DIAGNOSIS — R20.2 RIGHT HAND PARESTHESIA: ICD-10-CM

## 2023-02-21 DIAGNOSIS — I10 ESSENTIAL HYPERTENSION: ICD-10-CM

## 2023-02-21 DIAGNOSIS — Z91.81 AT HIGH RISK FOR FALLS: ICD-10-CM

## 2023-02-21 DIAGNOSIS — R60.9 PERIPHERAL EDEMA: ICD-10-CM

## 2023-02-21 DIAGNOSIS — J44.9 COPD, SEVERE (HCC): ICD-10-CM

## 2023-02-21 DIAGNOSIS — E78.5 DYSLIPIDEMIA: ICD-10-CM

## 2023-02-21 PROBLEM — C64.9 MALIGNANT TUMOR OF KIDNEY (HCC): Status: RESOLVED | Noted: 2017-10-07 | Resolved: 2023-02-21

## 2023-02-21 PROBLEM — R60.0 PERIPHERAL EDEMA: Status: ACTIVE | Noted: 2023-02-21

## 2023-02-21 PROCEDURE — G8484 FLU IMMUNIZE NO ADMIN: HCPCS | Performed by: INTERNAL MEDICINE

## 2023-02-21 PROCEDURE — 3074F SYST BP LT 130 MM HG: CPT | Performed by: INTERNAL MEDICINE

## 2023-02-21 PROCEDURE — G8427 DOCREV CUR MEDS BY ELIG CLIN: HCPCS | Performed by: INTERNAL MEDICINE

## 2023-02-21 PROCEDURE — 99204 OFFICE O/P NEW MOD 45 MIN: CPT | Performed by: INTERNAL MEDICINE

## 2023-02-21 PROCEDURE — 3017F COLORECTAL CA SCREEN DOC REV: CPT | Performed by: INTERNAL MEDICINE

## 2023-02-21 PROCEDURE — 3023F SPIROM DOC REV: CPT | Performed by: INTERNAL MEDICINE

## 2023-02-21 PROCEDURE — 3078F DIAST BP <80 MM HG: CPT | Performed by: INTERNAL MEDICINE

## 2023-02-21 PROCEDURE — G8417 CALC BMI ABV UP PARAM F/U: HCPCS | Performed by: INTERNAL MEDICINE

## 2023-02-21 PROCEDURE — 4004F PT TOBACCO SCREEN RCVD TLK: CPT | Performed by: INTERNAL MEDICINE

## 2023-02-21 PROCEDURE — 1123F ACP DISCUSS/DSCN MKR DOCD: CPT | Performed by: INTERNAL MEDICINE

## 2023-02-21 RX ORDER — FUROSEMIDE 40 MG/1
20 TABLET ORAL EVERY OTHER DAY
Qty: 30 TABLET | Refills: 1 | Status: ON HOLD
Start: 2023-02-21

## 2023-02-21 RX ORDER — DOCUSATE SODIUM 100 MG/1
100 CAPSULE, LIQUID FILLED ORAL DAILY PRN
Qty: 30 CAPSULE | Refills: 0 | Status: ON HOLD | OUTPATIENT
Start: 2023-02-21 | End: 2023-03-23

## 2023-02-21 SDOH — ECONOMIC STABILITY: FOOD INSECURITY: WITHIN THE PAST 12 MONTHS, YOU WORRIED THAT YOUR FOOD WOULD RUN OUT BEFORE YOU GOT MONEY TO BUY MORE.: NEVER TRUE

## 2023-02-21 SDOH — ECONOMIC STABILITY: HOUSING INSECURITY
IN THE LAST 12 MONTHS, WAS THERE A TIME WHEN YOU DID NOT HAVE A STEADY PLACE TO SLEEP OR SLEPT IN A SHELTER (INCLUDING NOW)?: NO

## 2023-02-21 SDOH — ECONOMIC STABILITY: FOOD INSECURITY: WITHIN THE PAST 12 MONTHS, THE FOOD YOU BOUGHT JUST DIDN'T LAST AND YOU DIDN'T HAVE MONEY TO GET MORE.: NEVER TRUE

## 2023-02-21 SDOH — ECONOMIC STABILITY: INCOME INSECURITY: HOW HARD IS IT FOR YOU TO PAY FOR THE VERY BASICS LIKE FOOD, HOUSING, MEDICAL CARE, AND HEATING?: NOT HARD AT ALL

## 2023-02-21 ASSESSMENT — PATIENT HEALTH QUESTIONNAIRE - PHQ9
SUM OF ALL RESPONSES TO PHQ QUESTIONS 1-9: 0
SUM OF ALL RESPONSES TO PHQ QUESTIONS 1-9: 0
1. LITTLE INTEREST OR PLEASURE IN DOING THINGS: 0
2. FEELING DOWN, DEPRESSED OR HOPELESS: 0
SUM OF ALL RESPONSES TO PHQ QUESTIONS 1-9: 0
SUM OF ALL RESPONSES TO PHQ QUESTIONS 1-9: 0
SUM OF ALL RESPONSES TO PHQ9 QUESTIONS 1 & 2: 0

## 2023-02-21 NOTE — PATIENT INSTRUCTIONS
Fasting for a blood test: taking the right steps before testing helps ensure your results will be accurate. Why do I need to fast before my blood test?  If your healthcare provider has told you to fast before a blood test, it means you should not eat or drink anything, except water, for several hours before your test. When you eat and drink normally, those foods and beverages are absorbed into your bloodstream. That could affect the results of certain types of blood tests. What types of blood tests require fasting? The most common tests that require fasting are:  Glucose tests, which measure your blood sugar. Lipid tests, which measure cholesterol and triglycerides. You do not need to be fasting for HbA1C test.     How long do I have to fast before the test?  You usually need to fast for 8-12 hours before the test. Most tests that require fasting are scheduled for early in the morning. That way, most of your fasting time will be overnight. Can I drink anything besides water during a fast?  No. Juice, coffee, soda, and other beverages can get in your bloodstream and affect your results. In addition, you should not:  Chew gum   Smoke   Exercise  These activities can also affect your results. But you can drink water. It's actually encouraged that you drink 2 glasses of water before any blood test. It helps keep more fluid in your veins, which can make it easier to draw blood. If you are dehydrated, your blood draw experience may be unpleasant. Can I continue taking medicine during a fast?  Most of the time it's OK to take your usual medicines with water, unless otherwise specified by your healthcare provider. You may need to avoid certain medicines that you normally take with food.      What if I make a mistake and have something to eat or drink besides water during my fast?  Tell your healthcare provider before your test. Your test will most likely have to be re-scheduled for another time when you are able to complete your fast.    When can I eat and drink normally again? As soon as your test is over. You may want to bring a snack with you, so you can eat right away. Is there anything else I need to know about fasting before a blood test?  Be sure to talk to your healthcare provider if you have any questions or concerns about fasting. You should talk to your provider before taking any lab test. Most tests don't require fasting or other special preparations. For others, you may need to avoid certain foods, medicines, or activities.        [facility] Internal Medicine  [phone]

## 2023-02-21 NOTE — PROGRESS NOTES
2/21/23    Bayron Wyman  1950    Chief Complaint   Patient presents with    Established New Doctor       History of Present Illness:  Bayron Wyman is a 74 y.o. pleasant gentleman presenting today to establish care as a new patient with a chief complaint of HTN, HL, COPD, edema, BP. 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 He has PSA done yearly. No longer sees Dr. Tha Lowe 5 years after EBRT. # Sees Dr. Phoebe Okeefe from [facility] for bullous pemphigoid. He is on Dapsone. He has had rash for > 1 year. It is under better control with Dapsone but still has some days that are bad. # Off Triamterene/HCTZ d/t hyponatremia. BP today 100/60. He has been on Lasix for edema. TTE done in Nov 2022. Sees Dr. Miguel Arias. # Follows with Dr. Erik Thao from [facility]. He has COPD. Declines NICK. He is on LABA, ICS, LAMA. # Patient has been having numbness in R fingers. He sustained a mechanical fall 3 months ago on concrete.      Melanie CECILIA  Used to be seen at the [facility] maintenance:   Health Maintenance Due   Topic Date Due    Hepatitis C screen  Never done    Shingles vaccine (1 of 2) Never done    Low dose CT lung screening  Never done    DTaP/Tdap/Td vaccine (2 - Td or Tdap) 01/03/2015    Annual Wellness Visit (AWV)  Never done    COVID-19 Vaccine (4 - Booster for Moderna series) 01/18/2022    Flu vaccine (1) 08/01/2022    Depression Screen  12/09/2022         Review of Systems:  Constitutional: no fevers, no chills   Ears: no hearing loss, no tinnitus, no vertigo  Eyes: no blurry vision, no diplopia, no dryness, no itchiness  Mouth: no oral ulcers, no dry mouth, no sore throat  Nose: no nasal congestion, no epistaxis  Cardiac: no chest pain, no palpitations, no leg swelling, no orthopnea, no PND, no syncope  Pulmonary: no dyspnea, no cough, no wheezing, no hemoptysis  GI: no nausea, no vomiting, no diarrhea, no constipation, no abdominal pain, no hematochezia  : no dysuria, no frequency, no urgency, no hematuria, no frothy urine  MSK: no arthralgias, no myalgias, no early morning stiffness, no Raynaud's   Neuro: no focal neurological deficits, no seizures  Sleep: no snoring, no daytime somnolence   Psych: no depression, no anxiety, no suicidal ideation      Physical Exam:  VITALS:   /60 (Site: Right Upper Arm, Position: Sitting, Cuff Size: Large Adult)   Pulse (!) 48   Resp 18   Ht 6' 1\" (1.854 m)   Wt 193 lb 9.6 oz (87.8 kg)   SpO2 98%   BMI 25.54 kg/m²     PHYSICAL EXAMINATION:  General: alert, awake, and oriented to time, place, person, and situation. Not in acute distress   Skin: erythematous macules on legs (no blisters appreciated)   Head: normocephalic/atraumatic  Eyes: anicteric sclera, well-injected conjunctiva. Pupils are equally round and reactive to light.  Extraocular movements are intact   Nose: no septal deviation evident  Sinuses: no sinus tenderness  Ears: external ears normal  Neck: supple, no cervical lymphadenopathy, thyroid symmetric and not enlarged, no bruits   Heart: regular rate and rhythm, regular S1/S2, no S3/S4, no audible murmurs, no audible friction rub  Lungs: clear to auscultation bilaterally, no audible crackles, no audible wheezes, no audible rhonchi    Abdomen: normal bowel sounds, soft abdomen, non-tender, no palpable masses  Extremities: no edema, warm, no cyanosis, no clubbing. Good capillary refill   MSK: no tenderness across spinous processes, full ROM in all 4 extremities. No joint swelling or tenderness   Peripheral vascular: 2+ pulses symmetric (radial)  Neuro: gait normal, CN II-XII intact, motor power 5/5 in all 4 extremities, sensation intact and symmetric    Labs   I have personally reviewed labs, and discussed pertinent findings with patient on this date 2/21/2023     Imaging   I have personally reviewed imaging, and discussed pertinent findings with patient on this date 2/21/2023     Other notes  I have personally reviewed other notes, and discussed pertinent findings with patient on this date 2/21/2023       Assessment/Plan:     1. Bullous pemphigoid  Following with Derm for Dapsone     2. Essential hypertension  Stable  Continue off Triamterene/HCTZ for hyponatremia  Continue Metoprolol tartrate 25mg BID     3. Dyslipidemia  LDL 55, TG 82 Nov 2022  Continue Atorvastatin 40mg QD   - Lipid, Fasting; Future    4. Peripheral edema  Decrease Lasix to 20mg QOD or MWF PRN     5. COPD, severe (HCC)  Continue Advair, Spiriva (declined NICK)     6. Right hand paresthesia  - XR CERVICAL SPINE (2-3 VIEWS); Future    7. Hyperglycemia  - Hemoglobin A1C; Future    8. At high risk for falls  On the basis of positive falls risk screening, assessment and plan is as follows: home safety tips provided. Care discussed with patient and questions answered. Patient verbalizes understanding and agrees with plan. Discussed with patient the importance of continuity of care. I encouraged patient to schedule next appointment within 3 months with me. Patient prefers to be reached by Phone call at [phone] for future medical correspondence. Encouraged to activate [employer]. I reviewed and reconciled the medications this visit.    I reviewed and updated the past medical, surgical, social, and family history during this visit.   After visit summary provided.        Sukumar Zavaleta MD, MPH   Internal Medicine  2/21/2023   4:31 PM

## 2023-02-24 ENCOUNTER — HOSPITAL ENCOUNTER (INPATIENT)
Age: 73
LOS: 3 days | Discharge: HOME | DRG: 871 | End: 2023-02-27
Attending: EMERGENCY MEDICINE | Admitting: STUDENT IN AN ORGANIZED HEALTH CARE EDUCATION/TRAINING PROGRAM
Payer: MEDICARE

## 2023-02-24 ENCOUNTER — APPOINTMENT (OUTPATIENT)
Dept: GENERAL RADIOLOGY | Age: 73
DRG: 871 | End: 2023-02-24
Payer: MEDICARE

## 2023-02-24 DIAGNOSIS — J96.01 ACUTE RESPIRATORY FAILURE WITH HYPOXIA (HCC): Primary | ICD-10-CM

## 2023-02-24 DIAGNOSIS — J18.9 PNEUMONIA OF RIGHT LOWER LOBE DUE TO INFECTIOUS ORGANISM: ICD-10-CM

## 2023-02-24 DIAGNOSIS — N17.9 AKI (ACUTE KIDNEY INJURY) (HCC): ICD-10-CM

## 2023-02-24 DIAGNOSIS — J18.9 COMMUNITY ACQUIRED PNEUMONIA OF RIGHT LOWER LOBE OF LUNG: ICD-10-CM

## 2023-02-24 LAB
ANION GAP SERPL CALCULATED.3IONS-SCNC: 13 MMOL/L (ref 4–16)
BASOPHILS ABSOLUTE: 0.1 K/CU MM
BASOPHILS RELATIVE PERCENT: 0.5 % (ref 0–1)
BUN SERPL-MCNC: 22 MG/DL (ref 6–23)
CALCIUM SERPL-MCNC: 8.8 MG/DL (ref 8.3–10.6)
CHLORIDE BLD-SCNC: 99 MMOL/L (ref 99–110)
CO2: 21 MMOL/L (ref 21–32)
CREAT SERPL-MCNC: 1.8 MG/DL (ref 0.9–1.3)
DIFFERENTIAL TYPE: ABNORMAL
EOSINOPHILS ABSOLUTE: 0.1 K/CU MM
EOSINOPHILS RELATIVE PERCENT: 0.5 % (ref 0–3)
GFR SERPL CREATININE-BSD FRML MDRD: 39 ML/MIN/1.73M2
GLUCOSE SERPL-MCNC: 90 MG/DL (ref 70–99)
HCT VFR BLD CALC: 41.5 % (ref 42–52)
HEMOGLOBIN: 13.1 GM/DL (ref 13.5–18)
IMMATURE NEUTROPHIL %: 0.6 % (ref 0–0.43)
LYMPHOCYTES ABSOLUTE: 0.8 K/CU MM
LYMPHOCYTES RELATIVE PERCENT: 5.6 % (ref 24–44)
MCH RBC QN AUTO: 34.8 PG (ref 27–31)
MCHC RBC AUTO-ENTMCNC: 31.6 % (ref 32–36)
MCV RBC AUTO: 110.4 FL (ref 78–100)
MONOCYTES ABSOLUTE: 1 K/CU MM
MONOCYTES RELATIVE PERCENT: 6.7 % (ref 0–4)
NUCLEATED RBC %: 0 %
PDW BLD-RTO: 13.1 % (ref 11.7–14.9)
PLATELET # BLD: 242 K/CU MM (ref 140–440)
PMV BLD AUTO: 9.1 FL (ref 7.5–11.1)
POTASSIUM SERPL-SCNC: 4.9 MMOL/L (ref 3.5–5.1)
PRO-BNP: 757.2 PG/ML
RAPID INFLUENZA  B AGN: NEGATIVE
RAPID INFLUENZA A AGN: NEGATIVE
RBC # BLD: 3.76 M/CU MM (ref 4.6–6.2)
SARS-COV-2 RDRP RESP QL NAA+PROBE: NOT DETECTED
SEGMENTED NEUTROPHILS ABSOLUTE COUNT: 12.9 K/CU MM
SEGMENTED NEUTROPHILS RELATIVE PERCENT: 86.1 % (ref 36–66)
SODIUM BLD-SCNC: 133 MMOL/L (ref 135–145)
SOURCE: NORMAL
TOTAL IMMATURE NEUTOROPHIL: 0.09 K/CU MM
TOTAL NUCLEATED RBC: 0 K/CU MM
TROPONIN T: <0.01 NG/ML
WBC # BLD: 15 K/CU MM (ref 4–10.5)

## 2023-02-24 PROCEDURE — 85025 COMPLETE CBC W/AUTO DIFF WBC: CPT

## 2023-02-24 PROCEDURE — 99285 EMERGENCY DEPT VISIT HI MDM: CPT

## 2023-02-24 PROCEDURE — 2140000000 HC CCU INTERMEDIATE R&B

## 2023-02-24 PROCEDURE — 83880 ASSAY OF NATRIURETIC PEPTIDE: CPT

## 2023-02-24 PROCEDURE — 6370000000 HC RX 637 (ALT 250 FOR IP): Performed by: STUDENT IN AN ORGANIZED HEALTH CARE EDUCATION/TRAINING PROGRAM

## 2023-02-24 PROCEDURE — 84484 ASSAY OF TROPONIN QUANT: CPT

## 2023-02-24 PROCEDURE — 2580000003 HC RX 258: Performed by: EMERGENCY MEDICINE

## 2023-02-24 PROCEDURE — 87635 SARS-COV-2 COVID-19 AMP PRB: CPT

## 2023-02-24 PROCEDURE — 6370000000 HC RX 637 (ALT 250 FOR IP): Performed by: EMERGENCY MEDICINE

## 2023-02-24 PROCEDURE — 94640 AIRWAY INHALATION TREATMENT: CPT

## 2023-02-24 PROCEDURE — 96375 TX/PRO/DX INJ NEW DRUG ADDON: CPT

## 2023-02-24 PROCEDURE — 87040 BLOOD CULTURE FOR BACTERIA: CPT

## 2023-02-24 PROCEDURE — 71045 X-RAY EXAM CHEST 1 VIEW: CPT

## 2023-02-24 PROCEDURE — 2580000003 HC RX 258: Performed by: STUDENT IN AN ORGANIZED HEALTH CARE EDUCATION/TRAINING PROGRAM

## 2023-02-24 PROCEDURE — 2700000000 HC OXYGEN THERAPY PER DAY

## 2023-02-24 PROCEDURE — 6360000002 HC RX W HCPCS: Performed by: EMERGENCY MEDICINE

## 2023-02-24 PROCEDURE — 93005 ELECTROCARDIOGRAM TRACING: CPT | Performed by: EMERGENCY MEDICINE

## 2023-02-24 PROCEDURE — 96365 THER/PROPH/DIAG IV INF INIT: CPT

## 2023-02-24 PROCEDURE — 80048 BASIC METABOLIC PNL TOTAL CA: CPT

## 2023-02-24 PROCEDURE — 87804 INFLUENZA ASSAY W/OPTIC: CPT

## 2023-02-24 RX ORDER — BUDESONIDE AND FORMOTEROL FUMARATE DIHYDRATE 160; 4.5 UG/1; UG/1
2 AEROSOL RESPIRATORY (INHALATION) 2 TIMES DAILY
Status: DISCONTINUED | OUTPATIENT
Start: 2023-02-24 | End: 2023-02-26

## 2023-02-24 RX ORDER — MAGNESIUM SULFATE IN WATER 40 MG/ML
2000 INJECTION, SOLUTION INTRAVENOUS PRN
Status: DISCONTINUED | OUTPATIENT
Start: 2023-02-24 | End: 2023-02-27 | Stop reason: HOSPADM

## 2023-02-24 RX ORDER — ATORVASTATIN CALCIUM 40 MG/1
40 TABLET, FILM COATED ORAL NIGHTLY
Status: DISCONTINUED | OUTPATIENT
Start: 2023-02-24 | End: 2023-02-27 | Stop reason: HOSPADM

## 2023-02-24 RX ORDER — SODIUM CHLORIDE 0.9 % (FLUSH) 0.9 %
5-40 SYRINGE (ML) INJECTION EVERY 12 HOURS SCHEDULED
Status: DISCONTINUED | OUTPATIENT
Start: 2023-02-24 | End: 2023-02-27 | Stop reason: HOSPADM

## 2023-02-24 RX ORDER — ACETAMINOPHEN 500 MG
1000 TABLET ORAL
Status: COMPLETED | OUTPATIENT
Start: 2023-02-24 | End: 2023-02-24

## 2023-02-24 RX ORDER — ACETAMINOPHEN 325 MG/1
650 TABLET ORAL EVERY 6 HOURS PRN
Status: DISCONTINUED | OUTPATIENT
Start: 2023-02-24 | End: 2023-02-27 | Stop reason: HOSPADM

## 2023-02-24 RX ORDER — NICOTINE 21 MG/24HR
1 PATCH, TRANSDERMAL 24 HOURS TRANSDERMAL DAILY PRN
Status: DISCONTINUED | OUTPATIENT
Start: 2023-02-24 | End: 2023-02-27 | Stop reason: HOSPADM

## 2023-02-24 RX ORDER — ASPIRIN 81 MG/1
81 TABLET ORAL DAILY
Status: DISCONTINUED | OUTPATIENT
Start: 2023-02-25 | End: 2023-02-27 | Stop reason: HOSPADM

## 2023-02-24 RX ORDER — FUROSEMIDE 10 MG/ML
40 INJECTION INTRAMUSCULAR; INTRAVENOUS ONCE
Status: COMPLETED | OUTPATIENT
Start: 2023-02-24 | End: 2023-02-24

## 2023-02-24 RX ORDER — IPRATROPIUM BROMIDE AND ALBUTEROL SULFATE 2.5; .5 MG/3ML; MG/3ML
3 SOLUTION RESPIRATORY (INHALATION) ONCE
Status: COMPLETED | OUTPATIENT
Start: 2023-02-24 | End: 2023-02-24

## 2023-02-24 RX ORDER — IPRATROPIUM BROMIDE AND ALBUTEROL SULFATE 2.5; .5 MG/3ML; MG/3ML
1 SOLUTION RESPIRATORY (INHALATION) EVERY 4 HOURS
Status: DISCONTINUED | OUTPATIENT
Start: 2023-02-24 | End: 2023-02-25

## 2023-02-24 RX ORDER — ENOXAPARIN SODIUM 100 MG/ML
40 INJECTION SUBCUTANEOUS EVERY EVENING
Status: DISCONTINUED | OUTPATIENT
Start: 2023-02-25 | End: 2023-02-27 | Stop reason: HOSPADM

## 2023-02-24 RX ORDER — DOXEPIN HYDROCHLORIDE 25 MG/1
25 CAPSULE ORAL NIGHTLY
Status: DISCONTINUED | OUTPATIENT
Start: 2023-02-24 | End: 2023-02-27 | Stop reason: HOSPADM

## 2023-02-24 RX ORDER — AZITHROMYCIN 250 MG/1
500 TABLET, FILM COATED ORAL DAILY
Status: COMPLETED | OUTPATIENT
Start: 2023-02-25 | End: 2023-02-26

## 2023-02-24 RX ORDER — POLYETHYLENE GLYCOL 3350 17 G/17G
17 POWDER, FOR SOLUTION ORAL DAILY PRN
Status: DISCONTINUED | OUTPATIENT
Start: 2023-02-24 | End: 2023-02-27 | Stop reason: HOSPADM

## 2023-02-24 RX ORDER — SODIUM CHLORIDE 9 MG/ML
INJECTION, SOLUTION INTRAVENOUS PRN
Status: DISCONTINUED | OUTPATIENT
Start: 2023-02-24 | End: 2023-02-27 | Stop reason: HOSPADM

## 2023-02-24 RX ORDER — PANTOPRAZOLE SODIUM 40 MG/1
40 TABLET, DELAYED RELEASE ORAL DAILY
Status: DISCONTINUED | OUTPATIENT
Start: 2023-02-25 | End: 2023-02-27 | Stop reason: HOSPADM

## 2023-02-24 RX ORDER — SODIUM CHLORIDE 0.9 % (FLUSH) 0.9 %
5-40 SYRINGE (ML) INJECTION PRN
Status: DISCONTINUED | OUTPATIENT
Start: 2023-02-24 | End: 2023-02-27 | Stop reason: HOSPADM

## 2023-02-24 RX ORDER — POTASSIUM CHLORIDE 7.45 MG/ML
10 INJECTION INTRAVENOUS PRN
Status: DISCONTINUED | OUTPATIENT
Start: 2023-02-24 | End: 2023-02-27 | Stop reason: HOSPADM

## 2023-02-24 RX ORDER — ACETAMINOPHEN 650 MG/1
650 SUPPOSITORY RECTAL EVERY 6 HOURS PRN
Status: DISCONTINUED | OUTPATIENT
Start: 2023-02-24 | End: 2023-02-27 | Stop reason: HOSPADM

## 2023-02-24 RX ORDER — DAPSONE 25 MG/1
100 TABLET ORAL DAILY
Status: DISCONTINUED | OUTPATIENT
Start: 2023-02-25 | End: 2023-02-27 | Stop reason: HOSPADM

## 2023-02-24 RX ORDER — ONDANSETRON 2 MG/ML
4 INJECTION INTRAMUSCULAR; INTRAVENOUS EVERY 6 HOURS PRN
Status: DISCONTINUED | OUTPATIENT
Start: 2023-02-24 | End: 2023-02-27 | Stop reason: HOSPADM

## 2023-02-24 RX ORDER — AZITHROMYCIN 250 MG/1
500 TABLET, FILM COATED ORAL ONCE
Status: COMPLETED | OUTPATIENT
Start: 2023-02-24 | End: 2023-02-24

## 2023-02-24 RX ORDER — ONDANSETRON 4 MG/1
4 TABLET, ORALLY DISINTEGRATING ORAL EVERY 8 HOURS PRN
Status: DISCONTINUED | OUTPATIENT
Start: 2023-02-24 | End: 2023-02-27 | Stop reason: HOSPADM

## 2023-02-24 RX ORDER — PREDNISONE 20 MG/1
60 TABLET ORAL ONCE
Status: COMPLETED | OUTPATIENT
Start: 2023-02-24 | End: 2023-02-24

## 2023-02-24 RX ORDER — TAMSULOSIN HYDROCHLORIDE 0.4 MG/1
0.4 CAPSULE ORAL DAILY
Status: DISCONTINUED | OUTPATIENT
Start: 2023-02-25 | End: 2023-02-27 | Stop reason: HOSPADM

## 2023-02-24 RX ADMIN — SODIUM CHLORIDE, PRESERVATIVE FREE 10 ML: 5 INJECTION INTRAVENOUS at 23:46

## 2023-02-24 RX ADMIN — ACETAMINOPHEN 1000 MG: 500 TABLET ORAL at 20:21

## 2023-02-24 RX ADMIN — CEFTRIAXONE SODIUM 1000 MG: 1 INJECTION, POWDER, FOR SOLUTION INTRAMUSCULAR; INTRAVENOUS at 20:29

## 2023-02-24 RX ADMIN — IPRATROPIUM BROMIDE AND ALBUTEROL SULFATE 1 AMPULE: 2.5; .5 SOLUTION RESPIRATORY (INHALATION) at 23:53

## 2023-02-24 RX ADMIN — PREDNISONE 60 MG: 20 TABLET ORAL at 18:39

## 2023-02-24 RX ADMIN — FUROSEMIDE 40 MG: 10 INJECTION, SOLUTION INTRAVENOUS at 18:45

## 2023-02-24 RX ADMIN — ATORVASTATIN CALCIUM 40 MG: 40 TABLET, FILM COATED ORAL at 23:45

## 2023-02-24 RX ADMIN — IPRATROPIUM BROMIDE AND ALBUTEROL SULFATE 3 AMPULE: 2.5; .5 SOLUTION RESPIRATORY (INHALATION) at 18:56

## 2023-02-24 RX ADMIN — AZITHROMYCIN MONOHYDRATE 500 MG: 250 TABLET ORAL at 18:39

## 2023-02-24 ASSESSMENT — PAIN SCALES - GENERAL: PAINLEVEL_OUTOF10: 0

## 2023-02-25 LAB
ALBUMIN SERPL-MCNC: 3.6 GM/DL (ref 3.4–5)
ALP BLD-CCNC: 63 IU/L (ref 40–128)
ALT SERPL-CCNC: 9 U/L (ref 10–40)
ANION GAP SERPL CALCULATED.3IONS-SCNC: 14 MMOL/L (ref 4–16)
AST SERPL-CCNC: 16 IU/L (ref 15–37)
B PARAP IS1001 DNA NPH QL NAA+NON-PROBE: NOT DETECTED
B PERT.PT PRMT NPH QL NAA+NON-PROBE: NOT DETECTED
BASOPHILS ABSOLUTE: 0 K/CU MM
BASOPHILS RELATIVE PERCENT: 0.1 % (ref 0–1)
BILIRUB SERPL-MCNC: 0.7 MG/DL (ref 0–1)
BILIRUBIN URINE: NEGATIVE MG/DL
BLOOD, URINE: NEGATIVE
BUN SERPL-MCNC: 30 MG/DL (ref 6–23)
C PNEUM DNA NPH QL NAA+NON-PROBE: NOT DETECTED
CALCIUM SERPL-MCNC: 8.9 MG/DL (ref 8.3–10.6)
CHLORIDE BLD-SCNC: 99 MMOL/L (ref 99–110)
CLARITY: CLEAR
CO2: 21 MMOL/L (ref 21–32)
COLOR: YELLOW
COMMENT UA: NORMAL
CREAT SERPL-MCNC: 1.8 MG/DL (ref 0.9–1.3)
DIFFERENTIAL TYPE: ABNORMAL
EKG ATRIAL RATE: 86 BPM
EKG DIAGNOSIS: NORMAL
EKG P AXIS: -29 DEGREES
EKG P-R INTERVAL: 80 MS
EKG Q-T INTERVAL: 354 MS
EKG QRS DURATION: 86 MS
EKG QTC CALCULATION (BAZETT): 423 MS
EKG R AXIS: -23 DEGREES
EKG T AXIS: 136 DEGREES
EKG VENTRICULAR RATE: 86 BPM
EOSINOPHILS ABSOLUTE: 0 K/CU MM
EOSINOPHILS RELATIVE PERCENT: 0.1 % (ref 0–3)
FLUAV H1 2009 PAN RNA NPH NAA+NON-PROBE: NOT DETECTED
FLUAV H1 RNA NPH QL NAA+NON-PROBE: NOT DETECTED
FLUAV H3 RNA NPH QL NAA+NON-PROBE: NOT DETECTED
FLUAV RNA NPH QL NAA+NON-PROBE: NOT DETECTED
FLUBV RNA NPH QL NAA+NON-PROBE: NOT DETECTED
FOLATE SERPL-MCNC: 4.3 NG/ML (ref 3.1–17.5)
GFR SERPL CREATININE-BSD FRML MDRD: 39 ML/MIN/1.73M2
GLUCOSE SERPL-MCNC: 136 MG/DL (ref 70–99)
GLUCOSE, URINE: NEGATIVE MG/DL
HADV DNA NPH QL NAA+NON-PROBE: NOT DETECTED
HCOV 229E RNA NPH QL NAA+NON-PROBE: NOT DETECTED
HCOV HKU1 RNA NPH QL NAA+NON-PROBE: NOT DETECTED
HCOV NL63 RNA NPH QL NAA+NON-PROBE: NOT DETECTED
HCOV OC43 RNA NPH QL NAA+NON-PROBE: NOT DETECTED
HCT VFR BLD CALC: 36.4 % (ref 42–52)
HEMOGLOBIN: 11.7 GM/DL (ref 13.5–18)
HMPV RNA NPH QL NAA+NON-PROBE: NOT DETECTED
HPIV1 RNA NPH QL NAA+NON-PROBE: NOT DETECTED
HPIV2 RNA NPH QL NAA+NON-PROBE: NOT DETECTED
HPIV3 RNA NPH QL NAA+NON-PROBE: NOT DETECTED
HPIV4 RNA NPH QL NAA+NON-PROBE: NOT DETECTED
IMMATURE NEUTROPHIL %: 1.2 % (ref 0–0.43)
INR BLD: 0.88 INDEX
KETONES, URINE: NEGATIVE MG/DL
LEUKOCYTE ESTERASE, URINE: NEGATIVE
LYMPHOCYTES ABSOLUTE: 0.8 K/CU MM
LYMPHOCYTES RELATIVE PERCENT: 5.1 % (ref 24–44)
M PNEUMO DNA NPH QL NAA+NON-PROBE: NOT DETECTED
MCH RBC QN AUTO: 34.4 PG (ref 27–31)
MCHC RBC AUTO-ENTMCNC: 32.1 % (ref 32–36)
MCV RBC AUTO: 107.1 FL (ref 78–100)
MONOCYTES ABSOLUTE: 0.8 K/CU MM
MONOCYTES RELATIVE PERCENT: 5 % (ref 0–4)
NITRITE URINE, QUANTITATIVE: NEGATIVE
NUCLEATED RBC %: 0 %
PDW BLD-RTO: 13.2 % (ref 11.7–14.9)
PH, URINE: 5.5 (ref 5–8)
PLATELET # BLD: 223 K/CU MM (ref 140–440)
PMV BLD AUTO: 9.1 FL (ref 7.5–11.1)
POTASSIUM SERPL-SCNC: 3.9 MMOL/L (ref 3.5–5.1)
PROCALCITONIN SERPL-MCNC: 20.86 NG/ML
PROTEIN UA: NEGATIVE MG/DL
PROTHROMBIN TIME: 11.4 SECONDS (ref 11.7–14.5)
RBC # BLD: 3.4 M/CU MM (ref 4.6–6.2)
RSV RNA NPH QL NAA+NON-PROBE: NOT DETECTED
RV+EV RNA NPH QL NAA+NON-PROBE: NOT DETECTED
SARS-COV-2 RNA NPH QL NAA+NON-PROBE: NOT DETECTED
SEGMENTED NEUTROPHILS ABSOLUTE COUNT: 13.4 K/CU MM
SEGMENTED NEUTROPHILS RELATIVE PERCENT: 88.5 % (ref 36–66)
SODIUM BLD-SCNC: 134 MMOL/L (ref 135–145)
SPECIFIC GRAVITY UA: 1.01 (ref 1–1.03)
TOTAL IMMATURE NEUTOROPHIL: 0.18 K/CU MM
TOTAL NUCLEATED RBC: 0 K/CU MM
TOTAL PROTEIN: 5.4 GM/DL (ref 6.4–8.2)
UROBILINOGEN, URINE: 0.2 MG/DL (ref 0.2–1)
VITAMIN B-12: 481.8 PG/ML (ref 211–911)
WBC # BLD: 15.2 K/CU MM (ref 4–10.5)

## 2023-02-25 PROCEDURE — 6360000002 HC RX W HCPCS: Performed by: STUDENT IN AN ORGANIZED HEALTH CARE EDUCATION/TRAINING PROGRAM

## 2023-02-25 PROCEDURE — 2700000000 HC OXYGEN THERAPY PER DAY

## 2023-02-25 PROCEDURE — 6370000000 HC RX 637 (ALT 250 FOR IP): Performed by: STUDENT IN AN ORGANIZED HEALTH CARE EDUCATION/TRAINING PROGRAM

## 2023-02-25 PROCEDURE — 85610 PROTHROMBIN TIME: CPT

## 2023-02-25 PROCEDURE — 6360000002 HC RX W HCPCS: Performed by: INTERNAL MEDICINE

## 2023-02-25 PROCEDURE — 82607 VITAMIN B-12: CPT

## 2023-02-25 PROCEDURE — 84145 PROCALCITONIN (PCT): CPT

## 2023-02-25 PROCEDURE — 36415 COLL VENOUS BLD VENIPUNCTURE: CPT

## 2023-02-25 PROCEDURE — 81003 URINALYSIS AUTO W/O SCOPE: CPT

## 2023-02-25 PROCEDURE — 2580000003 HC RX 258: Performed by: INTERNAL MEDICINE

## 2023-02-25 PROCEDURE — 2580000003 HC RX 258: Performed by: STUDENT IN AN ORGANIZED HEALTH CARE EDUCATION/TRAINING PROGRAM

## 2023-02-25 PROCEDURE — 94761 N-INVAS EAR/PLS OXIMETRY MLT: CPT

## 2023-02-25 PROCEDURE — 82746 ASSAY OF FOLIC ACID SERUM: CPT

## 2023-02-25 PROCEDURE — 0202U NFCT DS 22 TRGT SARS-COV-2: CPT

## 2023-02-25 PROCEDURE — 93010 ELECTROCARDIOGRAM REPORT: CPT | Performed by: INTERNAL MEDICINE

## 2023-02-25 PROCEDURE — 87040 BLOOD CULTURE FOR BACTERIA: CPT

## 2023-02-25 PROCEDURE — 94640 AIRWAY INHALATION TREATMENT: CPT

## 2023-02-25 PROCEDURE — 2140000000 HC CCU INTERMEDIATE R&B

## 2023-02-25 PROCEDURE — 94664 DEMO&/EVAL PT USE INHALER: CPT

## 2023-02-25 PROCEDURE — 80053 COMPREHEN METABOLIC PANEL: CPT

## 2023-02-25 PROCEDURE — 6370000000 HC RX 637 (ALT 250 FOR IP): Performed by: INTERNAL MEDICINE

## 2023-02-25 PROCEDURE — 85025 COMPLETE CBC W/AUTO DIFF WBC: CPT

## 2023-02-25 RX ORDER — PREDNISONE 20 MG/1
40 TABLET ORAL DAILY
Status: DISCONTINUED | OUTPATIENT
Start: 2023-02-25 | End: 2023-02-27 | Stop reason: HOSPADM

## 2023-02-25 RX ORDER — SODIUM CHLORIDE 9 MG/ML
INJECTION, SOLUTION INTRAVENOUS CONTINUOUS
Status: DISCONTINUED | OUTPATIENT
Start: 2023-02-25 | End: 2023-02-27

## 2023-02-25 RX ORDER — 0.9 % SODIUM CHLORIDE 0.9 %
500 INTRAVENOUS SOLUTION INTRAVENOUS ONCE
Status: COMPLETED | OUTPATIENT
Start: 2023-02-25 | End: 2023-02-25

## 2023-02-25 RX ADMIN — DOXEPIN HYDROCHLORIDE 25 MG: 25 CAPSULE ORAL at 20:09

## 2023-02-25 RX ADMIN — DOXEPIN HYDROCHLORIDE 25 MG: 25 CAPSULE ORAL at 02:24

## 2023-02-25 RX ADMIN — ENOXAPARIN SODIUM 40 MG: 100 INJECTION SUBCUTANEOUS at 18:30

## 2023-02-25 RX ADMIN — AZITHROMYCIN MONOHYDRATE 500 MG: 250 TABLET ORAL at 18:30

## 2023-02-25 RX ADMIN — IPRATROPIUM BROMIDE AND ALBUTEROL SULFATE 1 AMPULE: 2.5; .5 SOLUTION RESPIRATORY (INHALATION) at 04:35

## 2023-02-25 RX ADMIN — SODIUM CHLORIDE 500 ML: 9 INJECTION, SOLUTION INTRAVENOUS at 12:13

## 2023-02-25 RX ADMIN — CEFTRIAXONE SODIUM 2000 MG: 2 INJECTION, POWDER, FOR SOLUTION INTRAMUSCULAR; INTRAVENOUS at 18:37

## 2023-02-25 RX ADMIN — CEFTRIAXONE 1000 MG: 1 INJECTION, POWDER, FOR SOLUTION INTRAMUSCULAR; INTRAVENOUS at 00:04

## 2023-02-25 RX ADMIN — ASPIRIN 81 MG: 81 TABLET, COATED ORAL at 10:18

## 2023-02-25 RX ADMIN — ATORVASTATIN CALCIUM 40 MG: 40 TABLET, FILM COATED ORAL at 20:09

## 2023-02-25 RX ADMIN — TAMSULOSIN HYDROCHLORIDE 0.4 MG: 0.4 CAPSULE ORAL at 10:18

## 2023-02-25 RX ADMIN — BUDESONIDE AND FORMOTEROL FUMARATE DIHYDRATE 2 PUFF: 160; 4.5 AEROSOL RESPIRATORY (INHALATION) at 07:46

## 2023-02-25 RX ADMIN — DAPSONE 100 MG: 25 TABLET ORAL at 10:18

## 2023-02-25 RX ADMIN — IPRATROPIUM BROMIDE AND ALBUTEROL SULFATE 1 AMPULE: 2.5; .5 SOLUTION RESPIRATORY (INHALATION) at 15:24

## 2023-02-25 RX ADMIN — PREDNISONE 40 MG: 20 TABLET ORAL at 20:09

## 2023-02-25 RX ADMIN — SODIUM CHLORIDE: 9 INJECTION, SOLUTION INTRAVENOUS at 18:36

## 2023-02-25 RX ADMIN — SODIUM CHLORIDE, PRESERVATIVE FREE 15 ML: 5 INJECTION INTRAVENOUS at 10:23

## 2023-02-25 RX ADMIN — IPRATROPIUM BROMIDE AND ALBUTEROL SULFATE 1 AMPULE: 2.5; .5 SOLUTION RESPIRATORY (INHALATION) at 07:35

## 2023-02-25 RX ADMIN — PANTOPRAZOLE SODIUM 40 MG: 40 TABLET, DELAYED RELEASE ORAL at 06:26

## 2023-02-25 RX ADMIN — HYDROCORTISONE SODIUM SUCCINATE 50 MG: 100 INJECTION, POWDER, FOR SOLUTION INTRAMUSCULAR; INTRAVENOUS at 13:57

## 2023-02-25 RX ADMIN — SODIUM CHLORIDE, PRESERVATIVE FREE 10 ML: 5 INJECTION INTRAVENOUS at 20:12

## 2023-02-25 RX ADMIN — METOPROLOL TARTRATE 25 MG: 25 TABLET, FILM COATED ORAL at 20:09

## 2023-02-25 ASSESSMENT — PAIN SCALES - GENERAL: PAINLEVEL_OUTOF10: 0

## 2023-02-25 NOTE — ED PROVIDER NOTES
Triage Chief Complaint:    Shortness of Breath    HPI   Wilman Jeffrey is a 74 y.o. male that presents for evaluation of shortness of breath. The patient states that he does have history of COPD so was not sure if this was related to that. He states he has not taken his Lasix as he normally would as well. Patient reports that he talked his primary care doctor about this. Shortness of breath has been progressive over the last 2 days. He has denied any productive cough but has had mild congestion. He has denied any fevers but has had some chills. Has denied any abdominal pain nausea or vomiting. No chest pain. No increased leg swelling but the patient has chronic leg swelling. Denies wearing oxygen at home. History from : Patient    Limitations to history : None    ROS:  10 systems reviewed and negative except as above. Past Medical History:   Diagnosis Date    CAD (coronary artery disease)     Sees Dr. Alvaro Zimmerman [facility])     bladder, prostate and right kidney    Carotid stenosis     Chronic back pain     low back    COPD (chronic obstructive pulmonary disease) (HCC)     Erectile dysfunction     Fatigue     Hematuria, gross     Bladder tumor surgery scheduled for 6/30/14.     History of external beam radiation therapy 2017    Prostate 6,600 cGy per  at [facility]    Hyperlipidemia     Hypertension     MI (mitral incompetence)     MI (myocardial infarction) ([facility] [address] 2010    Osteoarthritis     low back    Peyronie's disease     Pneumothorax 2002    hemothorax - s/p fall - had chest tube    Retinal detachment     left    Sciatica      Past Surgical History:   Procedure Laterality Date    APPENDECTOMY  1957    BLADDER TUMOR EXCISION  06/30/14    TURB w r stent placement    CARDIOVASCULAR STRESS TEST  6/2011 & 6/23/2014    COLONOSCOPY  10/16/2014    polyps times seven, diverticulosis    EYE SURGERY  2011    cataract removal    EYE SURGERY  2004    retinal detachment    INCISIONAL HERNIA REPAIR N/A 12/10/2015    NASAL HEMORRHAGE CONTROL  1991    PROSTATECTOMY N/A 12/10/2015    robotic assisted laporascopic    TONSILLECTOMY AND ADENOIDECTOMY  childhood    TOTAL NEPHRECTOMY Right 2018    at [address] N/A 12/10/2015     Family History   Problem Relation Age of Onset    Cancer Mother         colon, kidney, liver    Prostate Cancer Brother     Depression Brother     High Blood Pressure Brother     Other Father         AAA    High Blood Pressure Father         possible    Substance Abuse Brother         alcohol, tobacco    Cancer Brother         prostate     Social History     Socioeconomic History    Marital status:      Spouse name: Not on file    Number of children: Not on file    Years of education: Not on file    Highest education level: Not on file   Occupational History    Occupation: [employer]   Tobacco Use    Smoking status: Every Day     Packs/day: 2.00     Years: 40.00     Pack years: 80.00     Types: Cigarettes    Smokeless tobacco: Current   Substance and Sexual Activity    Alcohol use: Yes     Alcohol/week: 0.0 standard drinks     Comment: 2 vodka martinis daily      CAFFEINE: 2 cups coffee & can pop daily    Drug use: No    Sexual activity: Not on file   Other Topics Concern    Not on file   Social History Narrative    Not on file     Social Determinants of Health     Financial Resource Strain: Low Risk     Difficulty of Paying Living Expenses: Not hard at all   Food Insecurity: No Food Insecurity    Worried About [address] [facility] in the Last Year: Never true    [address] in the Last Year: Never true   Transportation Needs: Unknown    Lack of Transportation (Medical): Not on file    Lack of Transportation (Non-Medical):  No   Physical Activity: Not on file   Stress: Not on file   Social Connections: Not on file   Intimate Partner Violence: Not on file   Housing Stability: Unknown    Unable to Pay for Housing in the Last Year: Not on file    Number of Places Lived in the Last Year: Not on file    Unstable Housing in the Last Year: No     Current Facility-Administered Medications   Medication Dose Route Frequency Provider Last Rate Last Admin    [START ON 2/25/2023] aspirin EC tablet 81 mg  81 mg Oral Daily Patijovita Salguero MD        atorvastatin (LIPITOR) tablet 40 mg  40 mg Oral Nightly Patience MD Jose M        [START ON 2/25/2023] azithromycin (ZITHROMAX) tablet 500 mg  500 mg Oral Daily Danielaence MD Jose M        [START ON 2/25/2023] cefTRIAXone (ROCEPHIN) 2,000 mg in sodium chloride 0.9 % 50 mL IVPB (mini-bag)  2,000 mg IntraVENous Q24H Patience MD Jose M        ipratropium-albuterol (DUONEB) nebulizer solution 1 ampule  1 ampule Inhalation Q4H Patience MD Jose M        cefTRIAXone (ROCEPHIN) 1,000 mg in sodium chloride 0.9 % 50 mL IVPB (mini-bag)  1,000 mg IntraVENous Once Patience MD Jose M         Current Outpatient Medications   Medication Sig Dispense Refill    furosemide (LASIX) 40 MG tablet Take 0.5 tablets by mouth every other day 30 tablet 1    docusate sodium (COLACE) 100 MG capsule Take 1 capsule by mouth daily as needed for Constipation 30 capsule 0    dapsone 100 MG tablet Take 100 mg by mouth daily      doxepin (SINEQUAN) 25 MG capsule Take 25 mg by mouth at bedtime      fluticasone-salmeterol (ADVAIR) 250-50 MCG/ACT AEPB diskus inhaler Inhale 1 puff into the lungs every 12 hours      nicotine (NICODERM CQ) 14 MG/24HR Place 1 patch onto the skin daily 42 patch 0    nicotine (NICODERM CQ) 7 MG/24HR Place 1 patch onto the skin daily for 14 days 14 patch 0    nicotine (NICODERM CQ) 21 MG/24HR Place 1 patch onto the skin daily 30 patch 3    nicotine polacrilex (COMMIT) 2 MG lozenge Take 1 lozenge by mouth every hour as needed for Smoking cessation 100 each 3    metoprolol tartrate (LOPRESSOR) 25 MG tablet Take 25 mg by mouth 2 times daily      terbinafine (LAMISIL) 1 % cream Apply topically 2 times daily.  42 g 2    tamsulosin (FLOMAX) 0.4 MG capsule Take 0.4 mg by mouth daily      pantoprazole (PROTONIX) 40 MG tablet TAKE 1 TABLET BY MOUTH DAILY 90 tablet 1    SPIRIVA HANDIHALER 18 MCG inhalation capsule INHALE THE CONTENTS OF 1 CAPSULE INTO THE LUNGS DAILY 90 capsule 3    atorvastatin (LIPITOR) 40 MG tablet Take 1 tablet by mouth daily (Patient taking differently: Take 40 mg by mouth nightly) 30 tablet 0    aspirin 81 MG EC tablet Take 1 tablet by mouth daily 30 tablet 0    acetaminophen (TYLENOL) 500 MG tablet Take 1,000 mg by mouth in the morning and 1,000 mg at noon and 1,000 mg in the evening and 1,000 mg before bedtime. .       Allergies   Allergen Reactions    Lisinopril Other (See Comments)     Cough    Triamterene-Hydrochlorothiazide [Hydrochlorothiazide W-Triamterene] Rash       Nursing Notes Reviewed    Physical Exam:     ED Triage Vitals   Enc Vitals Group      BP 02/24/23 1836 100/66      Heart Rate 02/24/23 1836 87      Resp 02/24/23 1836 19      Temp 02/24/23 1856 98.2 °F (36.8 °C)      Temp Source 02/24/23 1856 Oral      SpO2 02/24/23 1836 94 %      Weight --       Height --       Head Circumference --       Peak Flow --       Pain Score --       Pain Loc --       Pain Edu? --       Excl. in [address]? --        My pulse ox interpretation is - normal    General appearance: Moderate acute distress  Skin:  Warm. Dry. No pallor. No rash. Eye:  Normal conjuctiva. no Icterus. Ears, nose, mouth and throat:  Oral mucosa moist   Heart:  Regular rate and rhythm    Perfusion:  Capillary refill <2 seconds   Respiratory:  Respirations labored. expiratory wheezes noted, slight tachypnea     Abdominal:  Soft. Nontender. Non distended.      Extremity:  No edema or tenderness  Neurological:  Alert and oriented,  Motor, sensory and coordination intact       I have reviewed and interpreted all of the currently available lab results from this visit (if applicable):  Results for orders placed or performed during the hospital encounter of 02/24/23   COVID-19, Rapid Specimen: Nasopharyngeal   Result Value Ref Range    Source UNKNOWN     SARS-CoV-2, NAAT NOT DETECTED NOT DETECTED   Rapid Flu Swab    Specimen: Nasopharyngeal   Result Value Ref Range    Rapid Influenza A Ag NEGATIVE NEGATIVE    Rapid Influenza B Ag NEGATIVE NEGATIVE   CBC with Auto Differential   Result Value Ref Range    WBC 15.0 (H) 4.0 - 10.5 K/CU MM    RBC 3.76 (L) 4.6 - 6.2 M/CU MM    Hemoglobin 13.1 (L) 13.5 - 18.0 GM/DL    Hematocrit 41.5 (L) 42 - 52 %    .4 (H) 78 - 100 FL    MCH 34.8 (H) 27 - 31 PG    MCHC 31.6 (L) 32.0 - 36.0 %    RDW 13.1 11.7 - 14.9 %    Platelets 441 246 - 933 K/CU MM    MPV 9.1 7.5 - 11.1 FL    Differential Type AUTOMATED DIFFERENTIAL     Segs Relative 86.1 (H) 36 - 66 %    Lymphocytes % 5.6 (L) 24 - 44 %    Monocytes % 6.7 (H) 0 - 4 %    Eosinophils % 0.5 0 - 3 %    Basophils % 0.5 0 - 1 %    Segs Absolute 12.9 K/CU MM    Lymphocytes Absolute 0.8 K/CU MM    Monocytes Absolute 1.0 K/CU MM    Eosinophils Absolute 0.1 K/CU MM    Basophils Absolute 0.1 K/CU MM    Nucleated RBC % 0.0 %    Total Nucleated RBC 0.0 K/CU MM    Total Immature Neutrophil 0.09 K/CU MM    Immature Neutrophil % 0.6 (H) 0 - 0.43 %   BMP   Result Value Ref Range    Sodium 133 (L) 135 - 145 MMOL/L    Potassium 4.9 3.5 - 5.1 MMOL/L    Chloride 99 99 - 110 mMol/L    CO2 21 21 - 32 MMOL/L    Anion Gap 13 4 - 16    BUN 22 6 - 23 MG/DL    Creatinine 1.8 (H) 0.9 - 1.3 MG/DL    Est, Glom Filt Rate 39 (L) >60 mL/min/1.73m2    Glucose 90 70 - 99 MG/DL    Calcium 8.8 8.3 - 10.6 MG/DL   Troponin   Result Value Ref Range    Troponin T <0.010 <0.01 NG/ML   Brain Natriuretic Peptide   Result Value Ref Range    Pro-.2 (H) <300 PG/ML   EKG 12 Lead   Result Value Ref Range    Ventricular Rate 86 BPM    Atrial Rate 86 BPM    P-R Interval 80 ms    QRS Duration 86 ms    Q-T Interval 354 ms    QTc Calculation (Bazett) 423 ms    P Axis -29 degrees    R Axis -23 degrees    T Axis 136 degrees    Diagnosis Undetermined rhythm  Low voltage QRS  Cannot rule out Anterior infarct , age undetermined  ST & T wave abnormality, consider lateral ischemia  Abnormal ECG  When compared with ECG of 22-NOV-2022 11:28,  Current undetermined rhythm precludes rhythm comparison, needs review  Inverted T waves have replaced nonspecific T wave abnormality in Anterior leads        Radiographs (if obtained):  [] The following radiograph was interpreted by myself in the absence of a radiologist:   [] Radiologist's Report Reviewed:  XR CHEST PORTABLE   Final Result   Findings of right lung base pneumonia with suspected small layering right   parapneumonic effusion. Procedures:  12 lead EKG per my interpretation:  Normal Sinus Rhythm and sinus arrhythmia. I do note that there are P waves in lead V1 and V3  Rate: 86  QTc is  normal  There are no T wave changes appreciated. There are no ST wave changes appreciated. Prior EKG to compare with was available and is similar    (All EKG's are interpreted by myself in the absence of a cardiologist)    Critical Care: Total critical care time today provided was 33 minutes. This excludes seperately billable procedure. Critical care time provided for potential or impending respiratory failure that required close evaluation and/or intervention with concern for patient decompensation. Chart review shows recent radiographs:  No results found.       MDM:     Discussion with Other Professionals : Admitting Team Dr. Bianca Espinosa    Social Determinants: None    Records Reviewed : Inpatient Notes showed patient had prior hospitalization for this in November and at that time also had to be placed on oxygen    Chronic conditions affecting care: COPD    Labs ordered and results as above (interpreted by myself), concerning for leukocytosis, slightly elevated BNP, slightly elevated creatinine  Imaging interpreted and reviewed by myself: CXR showed possible pneumonia in the right lower lobe with a small effusion  EKG interpreted by myself as above, not acutely concerning    Patient was given the following medications:  Medications   aspirin EC tablet 81 mg (has no administration in time range)   atorvastatin (LIPITOR) tablet 40 mg (has no administration in time range)   azithromycin (ZITHROMAX) tablet 500 mg (has no administration in time range)   cefTRIAXone (ROCEPHIN) 2,000 mg in sodium chloride 0.9 % 50 mL IVPB (mini-bag) (has no administration in time range)   ipratropium-albuterol (DUONEB) nebulizer solution 1 ampule (has no administration in time range)   cefTRIAXone (ROCEPHIN) 1,000 mg in sodium chloride 0.9 % 50 mL IVPB (mini-bag) (has no administration in time range)   ipratropium-albuterol (DUONEB) nebulizer solution 3 ampule (3 ampules Inhalation Given 2/24/23 1856)   azithromycin (ZITHROMAX) tablet 500 mg (500 mg Oral Given 2/24/23 1839)   predniSONE (DELTASONE) tablet 60 mg (60 mg Oral Given 2/24/23 1839)   furosemide (LASIX) injection 40 mg (40 mg IntraVENous Given 2/24/23 1845)   acetaminophen (TYLENOL) tablet 1,000 mg (1,000 mg Oral Given 2/24/23 2021)   cefTRIAXone (ROCEPHIN) 1,000 mg in sodium chloride 0.9 % 50 mL IVPB (mini-bag) (1,000 mg IntraVENous New Bag 2/24/23 2029)       Disposition Discussion:  Concern is that the patient is requiring oxygen and drops precipitously if you try to walk for with minimal exertion. He is maintaining his oxygenation on 2 L here. The patient has a leukocytosis further supporting that this may be more related to a pneumonia. Initially I given him azithromycin but I added on ceftriaxone. Patient had stated he had not taken his Lasix yet and so I did give him a dose of that. His EKG was nonischemic. His initial troponin was also reassuring so lower suspicion for ACS. Creatinine slightly elevated as well so there is a mild acute kidney injury for this.   This may be contributory but higher suspicion this is more infectious in nature given his congestion/cough and chest x-ray    Disposition: Hospitalized     I am the primary physician of record    Clinical Impression:  1. Acute respiratory failure with hypoxia ([facility] [address]    2. Pneumonia of right lower lobe due to infectious organism      Disposition referral (if applicable):  No follow-up provider specified. Disposition medications (if applicable):  New Prescriptions    No medications on file       Comment: Please note this report has been produced using speech recognition software and may contain errors related to that system including errors in grammar, punctuation, and spelling, as well as words and phrases that may be inappropriate. If there are any questions or concerns please feel free to contact the dictating provider for clarification.        Yolande Villagomez MD  02/24/23 3058

## 2023-02-25 NOTE — PROGRESS NOTES
[address] [place] [address]  Phone: [phone]  Office Hours: 8:30AM - 4:30PM  Monday - Friday    Thank You Dr Yuriy Grove for consulting Nephrology and the input/discussion  Full note to follow  Please see the prelim orders

## 2023-02-25 NOTE — PLAN OF CARE
Problem: Discharge Planning  Goal: Discharge to home or other facility with appropriate resources  Outcome: Progressing  Flowsheets (Taken 2/24/2023 2210 by Janette Hollingsworth RN)  Discharge to home or other facility with appropriate resources: Identify barriers to discharge with patient and caregiver

## 2023-02-25 NOTE — PROGRESS NOTES
4 Eyes Skin Assessment     NAME:  Leif Hardin  YOB: 1950  MEDICAL RECORD NUMBER:  [id number]    The patient is being assessed for  Admission    I agree that One RN has performed a thorough Head to Toe Skin Assessment on the patient. ALL assessment sites listed below have been assessed. Areas assessed by both nurses:    Head, Face, Ears, Shoulders, Back, Chest, Arms, Elbows, Hands, Sacrum. Buttock, Coccyx, Ischium, and Legs. Feet and Heels, Legs. Scabs and small abrasions noted to BLE and BUE. Healing abrasions noted to bilateral buttock cheeks. Does the Patient have a Wound? Yes wound(s) were present on assessment.  LDA wound assessment was Initiated and completed by RN       Trip Prevention initiated by RN: Yes   Wound Care Orders initiated by RN: No    Pressure Injury (Stage 3,4, Unstageable, DTI, NWPT, and Complex wounds) if present, place referral order by RN under : No    New and Established Ostomies, if present place, referral order under : No      Nurse 1 eSignature: Electronically signed by Verito Lozoya RN on 2/24/23 at 10:23 PM EST    **SHARE this note so that the co-signing nurse can place an eSignature**    Nurse 2 eSignature: Electronically signed by Bryan Arevalo RN on 2/25/23 at 12:39 AM EST

## 2023-02-25 NOTE — ED NOTES
ED TO INPATIENT SBAR HANDOFF    Patient Name: Lan Nixon   :  1950  74 y.o. MRN:  [id number]  Preferred Name     ED Room #:  ED33/ED-33  Family/Caregiver Present yes   Restraints no   Sitter no   Sepsis Risk Score Sepsis Risk Score: 3.61    Situation  Code Status: Prior No additional code details. Allergies: Lisinopril and Triamterene-hydrochlorothiazide [hydrochlorothiazide w-triamterene]  Weight: No data found. Arrived from: home  Chief Complaint:   Chief Complaint   Patient presents with    Shortness of [place] Problem/Diagnosis:  Active Problems:    * No active hospital problems. *  Resolved Problems:    * No resolved hospital problems. *    Imaging:   XR CHEST PORTABLE   Final Result   Findings of right lung base pneumonia with suspected small layering right   parapneumonic effusion.            Abnormal labs:   Abnormal Labs Reviewed   CBC WITH AUTO DIFFERENTIAL - Abnormal; Notable for the following components:       Result Value    WBC 15.0 (*)     RBC 3.76 (*)     Hemoglobin 13.1 (*)     Hematocrit 41.5 (*)     .4 (*)     MCH 34.8 (*)     MCHC 31.6 (*)     Segs Relative 86.1 (*)     Lymphocytes % 5.6 (*)     Monocytes % 6.7 (*)     Immature Neutrophil % 0.6 (*)     All other components within normal limits   BASIC METABOLIC PANEL - Abnormal; Notable for the following components:    Sodium 133 (*)     Creatinine 1.8 (*)     Est, Glom Filt Rate 39 (*)     All other components within normal limits   BRAIN NATRIURETIC PEPTIDE - Abnormal; Notable for the following components:    Pro-.2 (*)     All other components within normal limits     Critical values: no     Abnormal Assessment Findings: Pneumonia     Background  History:   Past Medical History:   Diagnosis Date    CAD (coronary artery disease)     Sees Dr. Addie Gallegos [facility])     bladder, prostate and right kidney    Carotid stenosis     Chronic back pain     low back    COPD (chronic obstructive pulmonary disease) [facility])     Erectile dysfunction     Fatigue     Hematuria, gross     Bladder tumor surgery scheduled for 6/30/14. History of external beam radiation therapy 2017    Prostate 6,600 cGy per  [facility]    Hyperlipidemia     Hypertension     MI (mitral incompetence)     MI (myocardial infarction) ([facility] [address] 2010    Osteoarthritis     low back    Peyronie's disease     Pneumothorax 2002    hemothorax - s/p fall - had chest tube    Retinal detachment     left    Sciatica        Assessment    Vitals/MEWS: MEWS Score: 4  Level of Consciousness: Alert (0)   Vitals:    02/24/23 1930 02/24/23 1941 02/24/23 2000 02/24/23 2030   BP: 101/72 101/72 92/60 99/65   Pulse: 88 88 95 88   Resp: 16 16 (!) 32 (!) 31   Temp: 99.1 °F (37.3 °C) 99.1 °F (37.3 °C)  98.2 °F (36.8 °C)   TempSrc: Oral Oral     SpO2: 93% 93% 91% 92%     FiO2 (%):      O2 Flow Rate: O2 Device: Nasal cannula O2 Flow Rate (L/min): 2 L/min  Cardiac Rhythm:    Pain Assessment:   [] Verbal [] Micah Smita Scale  Pain Scale: Pain Assessment  Pain Assessment: None - Denies Pain  Pain Level: 0  Patient's Stated Pain Goal: 0 - No pain  Last documented pain score (0-10 scale) Pain Level: 0  Last documented pain medication administered:    Mental Status: oriented, alert, coherent, and logical  NIH Score: NIH     C-SSRS: Risk of Suicide: No Risk  Bedside swallow:    Nicolette Coma Scale (GCS): Active LDA's:   Peripheral IV 02/24/23 Right Wrist (Active)     PO Status: Regular  Pertinent or High Risk Medications/Drips: no   If Yes, please provide details:    Pending Blood Product Administration: no     You may also review the ED PT Care Timeline found under the Summary Nursing Index tab. Recommendation    Pending orders    Plan for Discharge (if known):    Additional Comments:     If any further questions, please call Sending RN at  [phone]    Electronically signed by: Electronically signed by Meron Ortega RN on 2/24/2023 at 8:40 PM       Meron Ortega NERIS  02/24/23 2042

## 2023-02-25 NOTE — CONSULTS
[address] [address] [address] [place] [address]  Phone: [phone]  Office Hours: 8:30AM - 4:30PM  Monday - Friday     Nephrology Service Consultation    Patient:  Marko Brantley  MRN: [id number]  Consulting physician:  Phong Gandhi MD  Reason for Consult: CHELSEY and hyponatremia    History Obtained From:  patient, electronic medical record  PCP: Eden Segura MD    HISTORY OF PRESENT ILLNESS:   The patient is a 74 y.o. male who presents with soa, found to have Chelsey and low Na  He has a hist of [address] and s/p Rt nephrectomy  Follows up with [facility] locally  No edema  Hist of bullous pemphigoid- back on steroids    Past Medical History:        Diagnosis Date    CAD (coronary artery disease)     Sees Dr. Kwan Abbott [facility])     bladder, prostate and right kidney    Carotid stenosis     Chronic back pain     low back    COPD (chronic obstructive pulmonary disease) ([facility] [address]     Erectile dysfunction     Fatigue     Hematuria, gross     Bladder tumor surgery scheduled for 6/30/14.     History of external beam radiation therapy 2017    Prostate 6,600 cGy per  at [facility]    Hyperlipidemia     Hypertension     MI (mitral incompetence)     MI (myocardial infarction) ([address] 2010    Osteoarthritis     low back    Peyronie's disease     Pneumothorax 2002    hemothorax - s/p fall - had chest tube    Retinal detachment     left    Sciatica        Past Surgical History:        Procedure Laterality Date    APPENDECTOMY  1957    BLADDER TUMOR EXCISION  06/30/14    TURB w r stent placement    CARDIOVASCULAR STRESS TEST  6/2011 & 6/23/2014    COLONOSCOPY  10/16/2014    polyps times seven, diverticulosis    EYE SURGERY  2011    cataract removal    EYE SURGERY  2004    retinal detachment    INCISIONAL HERNIA REPAIR N/A 12/10/2015    NASAL HEMORRHAGE CONTROL  1991    PROSTATECTOMY N/A 12/10/2015    robotic assisted laporascopic    TONSILLECTOMY AND ADENOIDECTOMY  childhood    TOTAL NEPHRECTOMY Right 2018    at [address]  N/A 12/10/2015       Medications:   Scheduled Meds:   sodium chloride  500 mL IntraVENous Once    predniSONE  40 mg Oral Daily    aspirin  81 mg Oral Daily    atorvastatin  40 mg Oral Nightly    dapsone  100 mg Oral Daily    doxepin  25 mg Oral Nightly    budesonide-formoterol  2 puff Inhalation BID    metoprolol tartrate  25 mg Oral BID    pantoprazole  40 mg Oral Daily    tamsulosin  0.4 mg Oral Daily    sodium chloride flush  5-40 mL IntraVENous 2 times per day    enoxaparin  40 mg SubCUTAneous QPM    azithromycin  500 mg Oral Daily    cefTRIAXone (ROCEPHIN) IV  2,000 mg IntraVENous Q24H     Continuous Infusions:   sodium chloride       PRN Meds:.sodium chloride flush, sodium chloride, potassium chloride, magnesium sulfate, ondansetron **OR** ondansetron, polyethylene glycol, nicotine, acetaminophen **OR** acetaminophen    Allergies:  Lisinopril and Triamterene-hydrochlorothiazide [hydrochlorothiazide w-triamterene]    Social History:   TOBACCO:   reports that he has been smoking cigarettes. He has a 80.00 pack-year smoking history. He uses smokeless tobacco.  ETOH:   reports current alcohol use. OCCUPATION:      Family History:       Problem Relation Age of Onset    Cancer Mother         colon, kidney, liver    Prostate Cancer Brother     Depression Brother     High Blood Pressure Brother     Other Father         AAA    High Blood Pressure Father         possible    Substance Abuse Brother         alcohol, tobacco    Cancer Brother         prostate       REVIEW OF SYSTEMS:  Negative except for cough and SOA.     Physical Exam:    Vitals: BP (!) 114/59   Pulse (!) 110   Temp 99.3 °F (37.4 °C) (Oral)   Resp 19   Ht 6' 1\" (1.854 m)   Wt 198 lb (89.8 kg)   SpO2 91%   BMI 26.12 kg/m²   General appearance: alert, appears stated age and cooperative  Skin: Skin color, texture, turgor normal. No rashes or lesions  HEENT: Head: Normocephalic, no lesions, without obvious abnormality. Neck: no adenopathy, no carotid bruit, no JVD, supple, symmetrical, trachea midline, and thyroid not enlarged, symmetric, no tenderness/mass/nodules  Lungs: diminished breath sounds bibasilar and rhonchi bilaterally  Heart: regular rate and rhythm, S1, S2 normal, no murmur, click, rub or gallop  Abdomen: soft, non-tender; bowel sounds normal; no masses,  no organomegaly  Extremities: extremities normal, atraumatic, no cyanosis or edema  Neurologic: Mental status: Alert, oriented, thought content appropriate    CBC:   Recent Labs     02/24/23 1847 02/25/23  1002   WBC 15.0* 15.2*   HGB 13.1* 11.7*    223     BMP:    Recent Labs     02/24/23 1847 02/25/23  1002   * 134*   K 4.9 3.9   CL 99 99   CO2 21 21   BUN 22 30*   CREATININE 1.8* 1.8*   GLUCOSE 90 136*     Troponin: No results for input(s): TROPONINI in the last 72 hours. BNP: No results for input(s): BNP in the last 72 hours. Lipids: No results for input(s): CHOL, HDL in the last 72 hours.     Invalid input(s): LDLCALCU  ABGs:   Lab Results   Component Value Date/Time    PO2ART 73 11/22/2022 02:00 PM    IPA3YRX 41.0 11/22/2022 02:00 PM     -----------------------------------------------------------------      Assessment and Recommendations     Patient Active Problem List   Diagnosis Code    COPD, severe ([facility] J44.9    Essential hypertension I10    Dyslipidemia E78.5    Tobacco use Z72.0    ED (erectile dysfunction) N52.9    CAD (coronary artery disease) I25.10    History of MI (myocardial infarction) I25.2    Prostate cancer ([facility] C61    Urinary frequency R35.0    GERD (gastroesophageal reflux disease) K21.9    Hordeolum externum of right lower eyelid H00.012    Acute respiratory failure with hypoxia (HCC) J96.01    Peripheral edema R60.9    Community acquired pneumonia of right lower lobe of lung J18.9   MDM  CHELSEY likely prerenal  and less likely ATN or obstruction- non oliguric  Hyponatremia hypovolemic  BP normally runs  as OP  Suggest  Urine studies  Start IVF NS at 75/hr  Will follow david Trujillo MD, MD    negative No joint pain, swelling or deformity; no limitation of movement

## 2023-02-25 NOTE — H&P
History and Physical      Name:  Lina Uribe /Age/Sex: 1950  (74 y.o. male)   MRN & CSN:  [id number] & [id number] Encounter Date/Time: 2023 9:25 PM EST   Location:  ED33/ED-33 PCP: Lorene Tapia MD       Hospital Day: 1    Assessment and Plan:     Patient is a 64-year-old male who presented with SOB x2 days. # Sepsis and acute hypoxemic respiratory failure secondary to community-acquired pneumonia  - Presented with worsening SOB and subjective fevers x2 days. No known sick contacts or travels. - Requiring 2L NC in the ED. AF, borderline tachycardia (on BB), mild leukocytosis of 15.0. LA ordered, no AG. CXR showing RLL pneumonia with small parapneumonia fluid. RVP ordered. - Started on Rocephin/Azithro in the ED, continue. No IVF due to evidence of volume overload. - Follow-up cultures and inflammatory markers. # Severe COPD (not on O2 at baseline)  - PFTs in 2015 showing severe COPD.  - Continues to smoke. Compliant with inhalers. - No change in cough or sputum. No wheezing on exam.  - O2 as needed for goal SpO2 of 88-92%. Continue ICS/LABA and NICK/LAMA. # CHELSEY  - Hx of right nephrectomy in . - Cr 1.8 on admission, baseline ~ 1.2.  - UA ordered. - No recent NSAIDs or contrast. Received Lasix in the ED for volume overload. - Follow-up repeat labs. # CAD  - Allegedly stress test in 2022 was negative. - Continue ASA and Lipitor with Lopressor. # Bullous pemphigoid  - Followed by Sang Rivera. - Continue Dapsone. # Tobacco abuse  - Hx of smoking 1-1.5 PPD for over 50 years. - Advised on importance of complete cessation.  - Nicotine patches prn. # Hx of prostate cancer s/p prostatectomy in 2015  - Followed by Urology. - Continue Flomax. Checklist:  Advanced directive: full  Antibiotics: as above  Catheter: none  DVT ppx: Lovenox  Nutrition/Diet: cardiac    Disposition: patient requires continued admission due to CAP. MDM: high.     History of Present Illness:     Chief Complaint: shortness of breath    Patient is a 77-year-old male with a PMHx of CAD, Hx of prostate cancer s/p prostatectomy in 12/2015, severe COPD (not on O2), HLD, Bullous pemphigoid and tobacco abuse who presented to the ED with worsening SOB and subjective fevers x2 days. No known sick contacts or travels. No change in cough or sputum. No CP, N/V, abdominal pain, diarrhea or urinary changes. Continues to smoke 1-1.5 PPD, and occasional alcohol use. ROS:     Ten point ROS reviewed negative, unless as noted above. Objective:     No intake or output data in the 24 hours ending 02/24/23 2125     Vitals:   Vitals:    02/24/23 1930 02/24/23 1941 02/24/23 2000 02/24/23 2030   BP: 101/72 101/72 92/60 99/65   Pulse: 88 88 95 88   Resp: 16 16 (!) 32 (!) 31   Temp: 99.1 °F (37.3 °C) 99.1 °F (37.3 °C)  98.2 °F (36.8 °C)   TempSrc: Oral Oral     SpO2: 93% 93% 91% 92%     BMI: There is no height or weight on file to calculate BMI. General: Awake. WDWN. AAOx3. HEENT: PERRLA. Vision grossly intact. Hearing grossly intact. Oropharynx clear. Neck: Supple. JVD to mid neck. CV: RRR. NL S1/S2. CR <2 secs. Trace peripheral edema. Pulm: Increased effort on 2L NC. Decreased BS in right base. Mild left basilar rales. GI: +BS x4. Soft. NT/ND. : No CVA or suprapubic tenderness. No Dewitt catheter. Skin: Intact. Normal coloration, warm, dry. MSK: No gross joint deformities. Full ROM. Neuro: CNs grossly intact. Normal speech. No focal deficit. Psych: Good judgement and reason. Past History: PMHx:   Past Medical History:   Diagnosis Date    CAD (coronary artery disease)     Sees Dr. Jacque Hayes [facility])     bladder, prostate and right kidney    Carotid stenosis     Chronic back pain     low back    COPD (chronic obstructive pulmonary disease) (HCC)     Erectile dysfunction     Fatigue     Hematuria, gross     Bladder tumor surgery scheduled for 6/30/14.     History of external beam radiation therapy 2017    Prostate 6,600 cGy per  [facility]    Hyperlipidemia     Hypertension     MI (mitral incompetence)     MI (myocardial infarction) ([facility] [address] 2010    Osteoarthritis     low back    Peyronie's disease     Pneumothorax 2002    hemothorax - s/p fall - had chest tube    Retinal detachment     left    Sciatica        PSHx:   Past Surgical History:   Procedure Laterality Date    APPENDECTOMY  1957    BLADDER TUMOR EXCISION  06/30/14    TURB w r stent placement    CARDIOVASCULAR STRESS TEST  6/2011 & 6/23/2014    COLONOSCOPY  10/16/2014    polyps times seven, diverticulosis    EYE SURGERY  2011    cataract removal    EYE SURGERY  2004    retinal detachment    INCISIONAL HERNIA REPAIR N/A 12/10/2015    NASAL HEMORRHAGE CONTROL  1991    PROSTATECTOMY N/A 12/10/2015    robotic assisted laporascopic    TONSILLECTOMY AND ADENOIDECTOMY  childhood    TOTAL NEPHRECTOMY Right 2018    at [address] N/A 12/10/2015       Allergies: Allergies   Allergen Reactions    Lisinopril Other (See Comments)     Cough    Triamterene-Hydrochlorothiazide [Hydrochlorothiazide W-Triamterene] Rash       FHx: family history includes Cancer in his brother and mother; Depression in his brother; High Blood Pressure in his brother and father; Other in his father; Prostate Cancer in his brother; Substance Abuse in his brother. SHx:   Social History     Socioeconomic History    Marital status:      Spouse name: None    Number of children: None    Years of education: None    Highest education level: None   Occupational History    Occupation: [employer]   Tobacco Use    Smoking status: Every Day     Packs/day: 2.00     Years: 40.00     Pack years: 80.00     Types: Cigarettes    Smokeless tobacco: Current   Substance and Sexual Activity    Alcohol use:  Yes     Alcohol/week: 0.0 standard drinks     Comment: 2 vodka martinis daily      CAFFEINE: 2 cups coffee & can pop daily    Drug use: No     Social Determinants of Health     Financial Resource Strain: Low Risk     Difficulty of Paying Living Expenses: Not hard at all   Food Insecurity: No Food Insecurity    Worried About Running Out of Food in the Last Year: Never true    Ran Out of Food in the Last Year: Never true   Transportation Needs: Unknown    Lack of Transportation (Non-Medical): No   Housing Stability: Unknown    Unstable Housing in the Last Year: No       Medications Prior to Admission     Prior to Admission medications    Medication Sig Start Date End Date Taking? Authorizing Provider   furosemide (LASIX) 40 MG tablet Take 0.5 tablets by mouth every other day 2/21/23   Marko Reeves MD   docusate sodium (COLACE) 100 MG capsule Take 1 capsule by mouth daily as needed for Constipation 2/21/23 3/23/23  Marko Reeves MD   dapsone 100 MG tablet Take 100 mg by mouth daily 1/24/23   Historical Provider, MD   doxepin (SINEQUAN) 25 MG capsule Take 25 mg by mouth at bedtime 1/24/23   Historical Provider, MD   fluticasone-salmeterol (ADVAIR) 250-50 MCG/ACT AEPB diskus inhaler Inhale 1 puff into the lungs every 12 hours    Historical Provider, MD   nicotine (NICODERM CQ) 14 MG/24HR Place 1 patch onto the skin daily 12/5/22 1/16/23  CHRISTOPHE Christiansen   nicotine (NICODERM CQ) 7 MG/24HR Place 1 patch onto the skin daily for 14 days 12/5/22 12/19/22  CHRISTOPHE Christiansen   nicotine (NICODERM CQ) 21 MG/24HR Place 1 patch onto the skin daily 11/26/22   Pepper Sharp MD   nicotine polacrilex (COMMIT) 2 MG lozenge Take 1 lozenge by mouth every hour as needed for Smoking cessation 11/25/22   Pepper Sharp MD   metoprolol tartrate (LOPRESSOR) 25 MG tablet Take 25 mg by mouth 2 times daily    Historical Provider, MD   terbinafine (LAMISIL) 1 % cream Apply topically 2 times daily.  12/9/21   Nati Junior MD   tamsulosin (FLOMAX) 0.4 MG capsule Take 0.4 mg by mouth daily    Historical Provider, MD   pantoprazole (PROTONIX) 40 MG tablet TAKE 1 TABLET BY MOUTH DAILY 1/7/20   Alex De Los Snatos MD   SPIRIVA HANDIHALER 18 MCG inhalation capsule INHALE THE CONTENTS OF 1 CAPSULE INTO THE LUNGS DAILY 8/26/19   Paulo Gonzalez MD   atorvastatin (LIPITOR) 40 MG tablet Take 1 tablet by mouth daily  Patient taking differently: Take 40 mg by mouth nightly 8/3/16   Alex De Los Santos MD   aspirin 81 MG EC tablet Take 1 tablet by mouth daily 8/3/16   Alex De Los Santos MD   acetaminophen (TYLENOL) 500 MG tablet Take 1,000 mg by mouth in the morning and 1,000 mg at noon and 1,000 mg in the evening and 1,000 mg before bedtime. Alexei Mauricio MD       Data:     CBC:   Recent Labs     02/24/23  1847   WBC 15.0*   HGB 13.1*      .4*   RDW 13.1   LYMPHOPCT 5.6*   MONOPCT 6.7*   BASOPCT 0.5   MONOSABS 1.0   LYMPHSABS 0.8   EOSABS 0.1   BASOSABS 0.1     CMP:    Recent Labs     02/24/23  1847   *   K 4.9   CL 99   CO2 21   BUN 22   CREATININE 1.8*   GLUCOSE 90   CALCIUM 8.8     Lipids:   Lab Results   Component Value Date/Time    CHOL 156 11/23/2022 05:56 AM    CHOL 171 06/09/2014 11:45 AM    HDL 85 11/23/2022 05:56 AM    TRIG 82 11/23/2022 05:56 AM     Hemoglobin A1C:   Lab Results   Component Value Date/Time    LABA1C 4.4 12/09/2021 03:28 PM     TSH: No results found for: TSH  Troponin:   Lab Results   Component Value Date/Time    TROPONINT <0.010 02/24/2023 06:47 PM    TROPONINT <0.010 11/22/2022 11:28 AM     BNP:   Recent Labs     02/24/23  1847   PROBNP 757.2*     Lactic Acid: No results for input(s): LACTA in the last 72 hours.   UA:  Lab Results   Component Value Date/Time    NITRU NEGATIVE 03/17/2022 01:47 PM    NITRU Positive 03/28/2018 12:00 AM    NITRU NEGATIVE 08/12/2011 02:00 PM    COLORU YELLOW 03/17/2022 01:47 PM    PHUR 6.5 03/28/2018 12:00 AM    WBCUA 132 03/17/2022 01:47 PM    RBCUA NONE SEEN 03/17/2022 01:47 PM    MUCUS FEW 03/22/2012 05:25 PM    TRICHOMONAS NONE SEEN 03/17/2022 01:47 PM    BACTERIA FEW 03/17/2022 01:47 PM CLARITYU SLIGHTLY CLOUDY 03/17/2022 01:47 PM    SPECGRAV 1.010 03/17/2022 01:47 PM    LEUKOCYTESUR NEGATIVE 03/17/2022 01:47 PM    UROBILINOGEN 0.2 03/17/2022 01:47 PM    BILIRUBINUR NEGATIVE 03/17/2022 01:47 PM    BILIRUBINUR negative 06/09/2014 11:45 AM    BLOODU SMALL 03/17/2022 01:47 PM    GLUCOSEU Negative 03/28/2018 12:00 AM    KETUA NEGATIVE 03/17/2022 01:47 PM     Urine Cultures: No results found for: LABURIN  Blood Cultures: No results found for: BC  No results found for: BLOODCULT2  Organism: No results found for: [facility]    Radiology results:  XR CHEST PORTABLE   Final Result   Findings of right lung base pneumonia with suspected small layering right   parapneumonic effusion.              Medications:     Medications:    [START ON 2/25/2023] aspirin  81 mg Oral Daily    atorvastatin  40 mg Oral Nightly    [START ON 2/25/2023] azithromycin  500 mg Oral Daily    [START ON 2/25/2023] cefTRIAXone (ROCEPHIN) IV  2,000 mg IntraVENous Q24H    ipratropium-albuterol  1 ampule Inhalation Q4H        Infusions:       PRN Meds:        Vicki Mae MD  02/24/23 9:25 PM

## 2023-02-25 NOTE — PROGRESS NOTES
Pt requesting tylenol. States he takes it Three times a day every day. Attempted to perfect serve Dr. Sylvester Rangel but message came up to call. Tried to call and was not able to LM.

## 2023-02-26 LAB
ANION GAP SERPL CALCULATED.3IONS-SCNC: 9 MMOL/L (ref 4–16)
BUN SERPL-MCNC: 31 MG/DL (ref 6–23)
CALCIUM SERPL-MCNC: 8.7 MG/DL (ref 8.3–10.6)
CHLORIDE BLD-SCNC: 104 MMOL/L (ref 99–110)
CO2: 25 MMOL/L (ref 21–32)
CREAT SERPL-MCNC: 1.4 MG/DL (ref 0.9–1.3)
CREAT UR-MCNC: 102 MG/DL (ref 39–259)
ESTIMATED AVERAGE GLUCOSE: 77 MG/DL
GFR SERPL CREATININE-BSD FRML MDRD: 53 ML/MIN/1.73M2
GLUCOSE SERPL-MCNC: 170 MG/DL (ref 70–99)
HBA1C MFR BLD: 4.3 % (ref 4.2–6.3)
LACTATE: 2.2 MMOL/L (ref 0.5–1.9)
MAGNESIUM: 2.1 MG/DL (ref 1.8–2.4)
PHOSPHORUS: 2.4 MG/DL (ref 2.5–4.9)
POTASSIUM SERPL-SCNC: 4.2 MMOL/L (ref 3.5–5.1)
SODIUM BLD-SCNC: 138 MMOL/L (ref 135–145)
SODIUM URINE: 16 MMOL/L (ref 35–167)

## 2023-02-26 PROCEDURE — 6370000000 HC RX 637 (ALT 250 FOR IP): Performed by: INTERNAL MEDICINE

## 2023-02-26 PROCEDURE — 6360000002 HC RX W HCPCS: Performed by: STUDENT IN AN ORGANIZED HEALTH CARE EDUCATION/TRAINING PROGRAM

## 2023-02-26 PROCEDURE — 94640 AIRWAY INHALATION TREATMENT: CPT

## 2023-02-26 PROCEDURE — 87899 AGENT NOS ASSAY W/OPTIC: CPT

## 2023-02-26 PROCEDURE — 94664 DEMO&/EVAL PT USE INHALER: CPT

## 2023-02-26 PROCEDURE — 2580000003 HC RX 258: Performed by: STUDENT IN AN ORGANIZED HEALTH CARE EDUCATION/TRAINING PROGRAM

## 2023-02-26 PROCEDURE — 82570 ASSAY OF URINE CREATININE: CPT

## 2023-02-26 PROCEDURE — 2700000000 HC OXYGEN THERAPY PER DAY

## 2023-02-26 PROCEDURE — 83735 ASSAY OF MAGNESIUM: CPT

## 2023-02-26 PROCEDURE — 83605 ASSAY OF LACTIC ACID: CPT

## 2023-02-26 PROCEDURE — 2140000000 HC CCU INTERMEDIATE R&B

## 2023-02-26 PROCEDURE — 36415 COLL VENOUS BLD VENIPUNCTURE: CPT

## 2023-02-26 PROCEDURE — 80048 BASIC METABOLIC PNL TOTAL CA: CPT

## 2023-02-26 PROCEDURE — 87449 NOS EACH ORGANISM AG IA: CPT

## 2023-02-26 PROCEDURE — 84300 ASSAY OF URINE SODIUM: CPT

## 2023-02-26 PROCEDURE — 83036 HEMOGLOBIN GLYCOSYLATED A1C: CPT

## 2023-02-26 PROCEDURE — 84100 ASSAY OF PHOSPHORUS: CPT

## 2023-02-26 PROCEDURE — 94761 N-INVAS EAR/PLS OXIMETRY MLT: CPT

## 2023-02-26 PROCEDURE — 6370000000 HC RX 637 (ALT 250 FOR IP): Performed by: STUDENT IN AN ORGANIZED HEALTH CARE EDUCATION/TRAINING PROGRAM

## 2023-02-26 RX ORDER — ALBUTEROL SULFATE 90 UG/1
2 AEROSOL, METERED RESPIRATORY (INHALATION)
Status: DISCONTINUED | OUTPATIENT
Start: 2023-02-26 | End: 2023-02-26

## 2023-02-26 RX ORDER — BUDESONIDE AND FORMOTEROL FUMARATE DIHYDRATE 160; 4.5 UG/1; UG/1
2 AEROSOL RESPIRATORY (INHALATION) 2 TIMES DAILY
Status: DISCONTINUED | OUTPATIENT
Start: 2023-02-26 | End: 2023-02-27 | Stop reason: HOSPADM

## 2023-02-26 RX ORDER — ALBUTEROL SULFATE 90 UG/1
2 AEROSOL, METERED RESPIRATORY (INHALATION)
Status: DISCONTINUED | OUTPATIENT
Start: 2023-02-26 | End: 2023-02-27 | Stop reason: HOSPADM

## 2023-02-26 RX ADMIN — TAMSULOSIN HYDROCHLORIDE 0.4 MG: 0.4 CAPSULE ORAL at 09:57

## 2023-02-26 RX ADMIN — ATORVASTATIN CALCIUM 40 MG: 40 TABLET, FILM COATED ORAL at 21:04

## 2023-02-26 RX ADMIN — ALBUTEROL SULFATE 2 PUFF: 90 AEROSOL, METERED RESPIRATORY (INHALATION) at 15:35

## 2023-02-26 RX ADMIN — PANTOPRAZOLE SODIUM 40 MG: 40 TABLET, DELAYED RELEASE ORAL at 05:47

## 2023-02-26 RX ADMIN — BUDESONIDE AND FORMOTEROL FUMARATE DIHYDRATE 2 PUFF: 160; 4.5 AEROSOL RESPIRATORY (INHALATION) at 11:51

## 2023-02-26 RX ADMIN — CEFTRIAXONE SODIUM 2000 MG: 2 INJECTION, POWDER, FOR SOLUTION INTRAMUSCULAR; INTRAVENOUS at 20:59

## 2023-02-26 RX ADMIN — ENOXAPARIN SODIUM 40 MG: 100 INJECTION SUBCUTANEOUS at 17:25

## 2023-02-26 RX ADMIN — DAPSONE 100 MG: 25 TABLET ORAL at 12:17

## 2023-02-26 RX ADMIN — DOXEPIN HYDROCHLORIDE 25 MG: 25 CAPSULE ORAL at 21:12

## 2023-02-26 RX ADMIN — SODIUM CHLORIDE, PRESERVATIVE FREE 10 ML: 5 INJECTION INTRAVENOUS at 21:13

## 2023-02-26 RX ADMIN — ALBUTEROL SULFATE 2 PUFF: 90 AEROSOL, METERED RESPIRATORY (INHALATION) at 11:49

## 2023-02-26 RX ADMIN — BUDESONIDE AND FORMOTEROL FUMARATE DIHYDRATE 2 PUFF: 160; 4.5 AEROSOL RESPIRATORY (INHALATION) at 19:48

## 2023-02-26 RX ADMIN — AZITHROMYCIN MONOHYDRATE 500 MG: 250 TABLET ORAL at 17:25

## 2023-02-26 RX ADMIN — ALBUTEROL SULFATE 2 PUFF: 90 AEROSOL, METERED RESPIRATORY (INHALATION) at 19:48

## 2023-02-26 RX ADMIN — IPRATROPIUM BROMIDE 2 PUFF: 17 AEROSOL, METERED RESPIRATORY (INHALATION) at 15:36

## 2023-02-26 RX ADMIN — IPRATROPIUM BROMIDE 2 PUFF: 17 AEROSOL, METERED RESPIRATORY (INHALATION) at 11:50

## 2023-02-26 RX ADMIN — PREDNISONE 40 MG: 20 TABLET ORAL at 09:57

## 2023-02-26 RX ADMIN — ASPIRIN 81 MG: 81 TABLET, COATED ORAL at 09:57

## 2023-02-26 ASSESSMENT — PAIN SCALES - GENERAL
PAINLEVEL_OUTOF10: 0
PAINLEVEL_OUTOF10: 0

## 2023-02-26 NOTE — CONSULTS
I have personally seen and examined this patient. I have fully participated in the care of this patient and I have reviewed and agree with all pertinent clinical information including history, physical exam, and plan. See my impression and plan below. Stable from cardiac wise. I agree with decreasing the dose of Lopressor due to the hypotension and bradycardia. Blood pressure improved. Denies any significant chest pain. On oxygen consider weaning it off. Electronically signed by Dillan Randall DO on 2/26/23 at 12:41 PM EST      Dictation [id number]  SOB, PNA; Pulm is following  ATB per primary  Hypotensive and tachycardic yesterday  Cont lopressor 12.5mg BID  CHELSEY, hx of right Nephrectomy; renal following   Hx of Bullous pemphigoid, has been on long term steroids  Will cont' to follow  Thanks for the consult    Echo 11/22/22   Technically difficult examination due to poor acoustical windows from COPD   exacerbation. Left ventricular systolic function is normal.   Ejection fraction is visually estimated at 50-55%. Aortic valve leaflets are somewhat thickened. Mild aortic insufficiency with PHT of 593 msec. Mild tricuspid regurgitation; RVSP: 45 mmHg consistent with PTHN. No evidence of any pericardial effusion. Stress 09/21/2022  EF 66%, ESV 36, No ischemia. No ischemia Artifacts decrease sensitivity of this finding. Compared to study of 8/10/2020, no changes. Evon Celestin

## 2023-02-26 NOTE — PROGRESS NOTES
[address] [place] [address], [address] [place] [address]  Phone: [phone]  Office Hours: 8:30AM - 4:30PM  Monday - Friday     Nephrology Progress Note  2/26/2023 10:17 AM  Subjective:   Admit Date: 2/24/2023  PCP: Bello Ornelas MD  Interval History: feels a little better  Follows up with [facility] since 2018 for scope every 6 months  Lives with wife  Making urine  Had agent orange exposure in [facility] tours    Diet: ADULT DIET; Regular      Data:   Scheduled Meds:   metoprolol tartrate  12.5 mg Oral BID    predniSONE  40 mg Oral Daily    aspirin  81 mg Oral Daily    atorvastatin  40 mg Oral Nightly    dapsone  100 mg Oral Daily    doxepin  25 mg Oral Nightly    budesonide-formoterol  2 puff Inhalation BID    pantoprazole  40 mg Oral Daily    tamsulosin  0.4 mg Oral Daily    sodium chloride flush  5-40 mL IntraVENous 2 times per day    enoxaparin  40 mg SubCUTAneous QPM    azithromycin  500 mg Oral Daily    cefTRIAXone (ROCEPHIN) IV  2,000 mg IntraVENous Q24H     Continuous Infusions:   sodium chloride 75 mL/hr at 02/25/23 1836    sodium chloride       PRN Meds:sodium chloride flush, sodium chloride, potassium chloride, magnesium sulfate, ondansetron **OR** ondansetron, polyethylene glycol, nicotine, acetaminophen **OR** acetaminophen  I/O last 3 completed shifts: In: 4990 [P.O.:720; I.V.:10; IV Piggyback:500]  Out: 1100 [Urine:1100]  I/O this shift:  In: 240 [P.O.:240]  Out: -     Intake/Output Summary (Last 24 hours) at 2/26/2023 1017  Last data filed at 2/26/2023 0955  Gross per 24 hour   Intake 1230 ml   Output 800 ml   Net 430 ml     CBC:   Recent Labs     02/24/23 1847 02/25/23  1002   WBC 15.0* 15.2*   HGB 13.1* 11.7*    223     BMP:    Recent Labs     02/24/23 1847 02/25/23  1002   * 134*   K 4.9 3.9   CL 99 99   CO2 21 21   BUN 22 30*   CREATININE 1.8* 1.8*   GLUCOSE 90 136*     Troponin: No results for input(s): TROPONINI in the last 72 hours.   BNP: No results for input(s): BNP in the last 72 hours. Lipids: No results for input(s): CHOL, HDL in the last 72 hours. Invalid input(s): LDLCALCU  ABGs:   Lab Results   Component Value Date/Time    PO2ART 73 11/22/2022 02:00 PM    IXH8DWR 41.0 11/22/2022 02:00 PM       Objective:   Vitals: BP 98/61   Pulse 83   Temp (!) 96.2 °F (35.7 °C) (Oral)   Resp 24   Ht 6' 1\" (1.854 m)   Wt 190 lb 7 oz (86.4 kg)   SpO2 92%   BMI 25.13 kg/m²   General appearance: alert and cooperative with exam  HEENT: Head: Normocephalic, no lesions, without obvious abnormality.   Neck: no adenopathy, no carotid bruit, no JVD, supple, symmetrical, trachea midline, and thyroid not enlarged, symmetric, no tenderness/mass/nodules  Lungs: diminished breath sounds bibasilar  Heart: S1, S2 normal  Abdomen: soft, non-tender; bowel sounds normal; no masses,  no organomegaly  Extremities: extremities normal, atraumatic, no cyanosis or edema  Neurologic: Mental status: Alert, oriented, thought content appropriate    Assessment and Plan:     Patient Active Problem List:     COPD, severe ([address]     Essential hypertension     Dyslipidemia     Tobacco use     ED (erectile dysfunction)     CAD (coronary artery disease)     History of MI (myocardial infarction)     Prostate cancer (HCC)     Urinary frequency     GERD (gastroesophageal reflux disease)     Hordeolum externum of right lower eyelid     Acute respiratory failure with hypoxia ([address]     Peripheral edema     Community acquired pneumonia of right lower lobe of lung      Plan   cont IVF at 75  Na stable  Urine Na 16 hypovolemic  Creat unchanged at 1.8  Will follow  Single functioning left kidney      Ronald Keller MD, MD

## 2023-02-26 NOTE — PROGRESS NOTES
V2.0  [facility] Hospitalist Progress Note      Name:  Liborio Vázquez /Age/Sex: 1950  (74 y.o. male)   MRN & CSN:  [id number] & [id number] Encounter Date/Time: 2023 8:14 PM EST    Location:  [address] [place] [address]-A PCP: Danelle Monroe MD       Hospital Day: 3    Assessment and Plan:   Liborio Vázquez is a 74 y.o. male        - notified of SBP in 80's. He was on prednisone (for bullous pemphigoid) for 1 month ending about 4 weeks ago - ordered 1 dose IV hydrocortisone and also started prednisone later in the day for wheezing/COPD exac. Started IV fluids for hypotension. Patient is a 64-year-old male who presented with SOB x2 days. # Sepsis and acute hypoxemic respiratory failure secondary to community-acquired pneumonia  - Presented with worsening SOB and subjective fevers x2 days. No known sick contacts or travels. - Requiring 2L NC in the ED. AF, borderline tachycardia (on BB), mild leukocytosis of 15.0. LA ordered, no AG. CXR showing RLL pneumonia with small parapneumonia fluid. RVP ordered. - Started on Rocephin/Azithro in the ED, continue. No IVF due to evidence of volume overload. - Follow-up cultures and inflammatory markers. # Severe COPD (not on O2 at baseline)  - PFTs in 2015 showing severe COPD.  - Continues to smoke. Compliant with inhalers. - No change in cough or sputum. No wheezing on exam.  - O2 as needed for goal SpO2 of 88-92%. Continue ICS/LABA and NICK/LAMA. # CHELSEY  - Hx of right nephrectomy in 2018. - Cr 1.8 on admission, baseline ~ 1.2.  - UA ordered. - No recent NSAIDs or contrast. Received Lasix in the ED for volume overload. - Follow-up repeat labs. # CAD  - Allegedly stress test in 2022 was negative. - Continue ASA and Lipitor with Lopressor. # Bullous pemphigoid  - Followed by Ale Johnson. - Continue Dapsone. # Tobacco abuse  - Hx of smoking 1-1.5 PPD for over 50 years.   - Advised on importance of complete cessation.  - Nicotine patches prn. # Hx of prostate cancer s/p prostatectomy in 12/2015  - Followed by Urology. - Continue Flomax      Diet ADULT DIET; Regular   DVT Prophylaxis [x] Lovenox, []  Heparin, [] SCDs, [] Ambulation,  [] Eliquis, [] Xarelto  [] Coumadin   Code Status Full Code   Disposition From: home  Expected Disposition: home  Estimated Date of Discharge: in about 2 days  Patient requires continued admission due to BP is low today   Surrogate Decision Maker/ POA Spouse Gael Barry is listed as the alternate contact in the chart     Subjective:     Chief Complaint: Shortness of Breath       Caro Skinner is a 74 y.o. male who presents with dyspnea    He reports he did not feel good yesterday and went to bed around 1 pm.          Review of Systems:    Review of Systems    No dizziness or vomiting reported    Objective:      Intake/Output Summary (Last 24 hours) at 2/26/2023 1836  Last data filed at 2/26/2023 1731  Gross per 24 hour   Intake 790 ml   Output 1100 ml   Net -310 ml          Vitals:   Vitals:    02/26/23 1800   BP: 102/69   Pulse: 79   Resp: 16   Temp:    SpO2:        Physical Exam:     General: NAD  Eyes: EOMI  ENT: neck supple  Lungs: bilat wheezing     Medications:    metoprolol tartrate  12.5 mg Oral BID    albuterol sulfate HFA  2 puff Inhalation Q4H While awake    And    ipratropium  2 puff Inhalation Q4H While awake    predniSONE  40 mg Oral Daily    aspirin  81 mg Oral Daily    atorvastatin  40 mg Oral Nightly    dapsone  100 mg Oral Daily    doxepin  25 mg Oral Nightly    budesonide-formoterol  2 puff Inhalation BID    pantoprazole  40 mg Oral Daily    tamsulosin  0.4 mg Oral Daily    sodium chloride flush  5-40 mL IntraVENous 2 times per day    enoxaparin  40 mg SubCUTAneous QPM    cefTRIAXone (ROCEPHIN) IV  2,000 mg IntraVENous Q24H      Infusions:    sodium chloride 75 mL/hr at 02/25/23 1836    sodium chloride       PRN Meds: sodium chloride flush, 5-40 mL, PRN  sodium chloride, , PRN  potassium chloride, 10 mEq, PRN  magnesium sulfate, 2,000 mg, PRN  ondansetron, 4 mg, Q8H PRN   Or  ondansetron, 4 mg, Q6H PRN  polyethylene glycol, 17 g, Daily PRN  nicotine, 1 patch, Daily PRN  acetaminophen, 650 mg, Q6H PRN   Or  acetaminophen, 650 mg, Q6H PRN      Labs      Recent Results (from the past 24 hour(s))   Sodium, urine, random - (Necessary for FENa calculation)    Collection Time: 02/26/23 12:18 AM   Result Value Ref Range    Sodium, Ur 16 (L) 35 - 167 MMOL/L   Creatinine, urine, random - (Necessary for FENa calculation)    Collection Time: 02/26/23 12:18 AM   Result Value Ref Range    Creatinine, Ur 102.0 39 - 259 MG/DL   Hemoglobin A1C    Collection Time: 02/26/23  9:49 AM   Result Value Ref Range    Hemoglobin A1C 4.3 4.2 - 6.3 %    eAG 77 mg/dL   Critical Care Panel    Collection Time: 02/26/23  9:49 AM   Result Value Ref Range    Sodium 138 135 - 145 MMOL/L    Potassium 4.2 3.5 - 5.1 MMOL/L    Chloride 104 99 - 110 mMol/L    CO2 25 21 - 32 MMOL/L    Anion Gap 9 4 - 16    BUN 31 (H) 6 - 23 MG/DL    Creatinine 1.4 (H) 0.9 - 1.3 MG/DL    Est, Glom Filt Rate 53 (L) >60 mL/min/1.73m2    Glucose 170 (H) 70 - 99 MG/DL    Calcium 8.7 8.3 - 10.6 MG/DL    Phosphorus 2.4 (L) 2.5 - 4.9 MG/DL    Magnesium 2.1 1.8 - 2.4 mg/dl        Imaging/Diagnostics Last 24 Hours   XR CHEST PORTABLE    Result Date: 2/24/2023  EXAMINATION: ONE XRAY VIEW OF THE CHEST 2/24/2023 6:37 pm COMPARISON: 11/22/2022 HISTORY: ORDERING SYSTEM PROVIDED HISTORY: shortness of breath TECHNOLOGIST PROVIDED HISTORY: Reason for exam:->shortness of breath Reason for Exam: shortness of breath Additional signs and symptoms: na Relevant Medical/Surgical History: bladder, prostate and right kidney cancer FINDINGS: New right lung base consolidation with blunting of the costophrenic sulcus. Left lung clear. No pneumothorax or left pleural effusion. Cardiac and mediastinal contours stable. No acute osseous abnormality.      Findings of right lung base pneumonia with suspected small layering right parapneumonic effusion.        Electronically signed by Arlene Prasad MD on 2/26/2023 at 6:36 PM

## 2023-02-26 NOTE — PROGRESS NOTES
V2.0  [facility] Hospitalist Progress Note      Name:  Winston Aceves /Age/Sex: 1950  (74 y.o. male)   MRN & CSN:  [id number] & [id number] Encounter Date/Time: 2023 8:14 PM EST    Location:  [address] [place] [address]-A PCP: Cassie Steward MD       Hospital Day: 2    Assessment and Plan:   Winston Aceves is a 74 y.o. male        - notified of SBP in 80's. He was on prednisone (for bullous pemphigoid) for 1 month ending about 4 weeks ago - ordered 1 dose IV hydrocortisone and also started prednisone later in the day for wheezing/COPD exac. Started IV fluids for hypotension. Patient is a 58-year-old male who presented with SOB x2 days. # Sepsis and acute hypoxemic respiratory failure secondary to community-acquired pneumonia  - Presented with worsening SOB and subjective fevers x2 days. No known sick contacts or travels. - Requiring 2L NC in the ED. AF, borderline tachycardia (on BB), mild leukocytosis of 15.0. LA ordered, no AG. CXR showing RLL pneumonia with small parapneumonia fluid. RVP ordered. - Started on Rocephin/Azithro in the ED, continue. No IVF due to evidence of volume overload. - Follow-up cultures and inflammatory markers. # Severe COPD (not on O2 at baseline)  - PFTs in 2015 showing severe COPD.  - Continues to smoke. Compliant with inhalers. - No change in cough or sputum. No wheezing on exam.  - O2 as needed for goal SpO2 of 88-92%. Continue ICS/LABA and NICK/LAMA. # CHELSEY  - Hx of right nephrectomy in 2018. - Cr 1.8 on admission, baseline ~ 1.2.  - UA ordered. - No recent NSAIDs or contrast. Received Lasix in the ED for volume overload. - Follow-up repeat labs. # CAD  - Allegedly stress test in 2022 was negative. - Continue ASA and Lipitor with Lopressor. # Bullous pemphigoid  - Followed by Fransisca Lemos. - Continue Dapsone. # Tobacco abuse  - Hx of smoking 1-1.5 PPD for over 50 years.   - Advised on importance of complete cessation.  - Nicotine patches prn. # Hx of prostate cancer s/p prostatectomy in 12/2015  - Followed by Urology. - Continue Flomax      Diet ADULT DIET; Regular   DVT Prophylaxis [x] Lovenox, []  Heparin, [] SCDs, [] Ambulation,  [] Eliquis, [] Xarelto  [] Coumadin   Code Status Full Code   Disposition From: home  Expected Disposition: home  Estimated Date of Discharge: in about 2 days  Patient requires continued admission due to BP is low today   Surrogate Decision Maker/ POA Spouse Celso Baires is listed as the alternate contact in the chart     Subjective:     Chief Complaint: Shortness of Breath       Steve Helton is a 74 y.o. male who presents with dyspnea    He reports he did not feel good yesterday and went to bed around 1 pm.          Review of Systems:    Review of Systems    No dizziness or vomiting reported    Objective:      Intake/Output Summary (Last 24 hours) at 2/25/2023 2014  Last data filed at 2/25/2023 2012  Gross per 24 hour   Intake 990 ml   Output 600 ml   Net 390 ml        Vitals:   Vitals:    02/25/23 2000   BP: (!) 102/55   Pulse: (!) 108   Resp: 19   Temp: 97 °F (36.1 °C)   SpO2: 91%       Physical Exam:     General: NAD  Eyes: EOMI  ENT: neck supple  Lungs: bilat wheezing     Medications:    predniSONE  40 mg Oral Daily    aspirin  81 mg Oral Daily    atorvastatin  40 mg Oral Nightly    dapsone  100 mg Oral Daily    doxepin  25 mg Oral Nightly    budesonide-formoterol  2 puff Inhalation BID    metoprolol tartrate  25 mg Oral BID    pantoprazole  40 mg Oral Daily    tamsulosin  0.4 mg Oral Daily    sodium chloride flush  5-40 mL IntraVENous 2 times per day    enoxaparin  40 mg SubCUTAneous QPM    azithromycin  500 mg Oral Daily    cefTRIAXone (ROCEPHIN) IV  2,000 mg IntraVENous Q24H      Infusions:    sodium chloride 75 mL/hr at 02/25/23 1836    sodium chloride       PRN Meds: sodium chloride flush, 5-40 mL, PRN  sodium chloride, , PRN  potassium chloride, 10 mEq, PRN  magnesium sulfate, 2,000 mg, PRN  ondansetron, 4 mg, Q8H PRN   Or  ondansetron, 4 mg, Q6H PRN  polyethylene glycol, 17 g, Daily PRN  nicotine, 1 patch, Daily PRN  acetaminophen, 650 mg, Q6H PRN   Or  acetaminophen, 650 mg, Q6H PRN        Labs      Recent Results (from the past 24 hour(s))   Respiratory Panel, Molecular, with COVID-19 (Restricted: peds pts or suitable admitted adults)    Collection Time: 02/25/23  5:10 AM    Specimen: Nasopharyngeal   Result Value Ref Range    Adenovirus Detection by PCR NOT DETECTED NOT DETECTED    Coronavirus 229E PCR NOT DETECTED NOT DETECTED    Coronavirus HKU1 PCR NOT DETECTED NOT DETECTED    Coronavirus NL63 PCR NOT DETECTED NOT DETECTED    Coronavirus OC43 PCR NOT DETECTED NOT DETECTED    SARS-CoV-2 NOT DETECTED NOT DETECTED    Human Metapneumovirus PCR NOT DETECTED NOT DETECTED    Rhinovirus Enterovirus PCR NOT DETECTED NOT DETECTED    Influenza A by PCR NOT DETECTED NOT DETECTED    Influenza A H1 Pandemic PCR NOT DETECTED NOT DETECTED    Influenza A H1 (2009) PCR NOT DETECTED NOT DETECTED    Influenza A H3 PCR NOT DETECTED NOT DETECTED    Influenza B by PCR NOT DETECTED NOT DETECTED    Parainfluenza 1 PCR NOT DETECTED NOT DETECTED    Parainfluenza 2 PCR NOT DETECTED NOT DETECTED    Parainfluenza 3 PCR NOT DETECTED NOT DETECTED    Parainfluenza 4 PCR NOT DETECTED NOT DETECTED    RSV PCR NOT DETECTED NOT DETECTED    Bordetella parapertussis by PCR NOT DETECTED NOT DETECTED    B Pertussis by PCR NOT DETECTED NOT DETECTED    Chlamydophila Pneumonia PCR NOT DETECTED NOT DETECTED    Mycoplasma pneumo by PCR NOT DETECTED NOT DETECTED   Urinalysis with Reflex to Culture    Collection Time: 02/25/23  5:10 AM    Specimen: Urine   Result Value Ref Range    Color, UA YELLOW YELLOW    Clarity, UA CLEAR CLEAR    Glucose, Urine NEGATIVE NEGATIVE MG/DL    Bilirubin Urine NEGATIVE NEGATIVE MG/DL    Ketones, Urine NEGATIVE NEGATIVE MG/DL    Specific Gravity, UA 1.010 1.001 - 1.035    Blood, Urine NEGATIVE NEGATIVE    pH, Urine 5.5 5.0 - 8.0    Protein, UA NEGATIVE NEGATIVE MG/DL    Urobilinogen, Urine 0.2 0.2 - 1.0 MG/DL    Nitrite Urine, Quantitative NEGATIVE NEGATIVE    Leukocyte Esterase, Urine NEGATIVE NEGATIVE    Urinalysis Comments       Microscopic exam not performed based on chemical results unless requested in original order.    Vitamin B12 & Folate    Collection Time: 02/25/23 10:02 AM   Result Value Ref Range    Vitamin B-12 481.8 211 - 911 pg/ml    Folate 4.3 3.1 - 17.5 NG/ML   CBC with Auto Differential    Collection Time: 02/25/23 10:02 AM   Result Value Ref Range    WBC 15.2 (H) 4.0 - 10.5 K/CU MM    RBC 3.40 (L) 4.6 - 6.2 M/CU MM    Hemoglobin 11.7 (L) 13.5 - 18.0 GM/DL    Hematocrit 36.4 (L) 42 - 52 %    .1 (H) 78 - 100 FL    MCH 34.4 (H) 27 - 31 PG    MCHC 32.1 32.0 - 36.0 %    RDW 13.2 11.7 - 14.9 %    Platelets 027 266 - 140 K/CU MM    MPV 9.1 7.5 - 11.1 FL    Differential Type AUTOMATED DIFFERENTIAL     Segs Relative 88.5 (H) 36 - 66 %    Lymphocytes % 5.1 (L) 24 - 44 %    Monocytes % 5.0 (H) 0 - 4 %    Eosinophils % 0.1 0 - 3 %    Basophils % 0.1 0 - 1 %    Segs Absolute 13.4 K/CU MM    Lymphocytes Absolute 0.8 K/CU MM    Monocytes Absolute 0.8 K/CU MM    Eosinophils Absolute 0.0 K/CU MM    Basophils Absolute 0.0 K/CU MM    Nucleated RBC % 0.0 %    Total Nucleated RBC 0.0 K/CU MM    Total Immature Neutrophil 0.18 K/CU MM    Immature Neutrophil % 1.2 (H) 0 - 0.43 %   Comprehensive Metabolic Panel w/ Reflex to MG    Collection Time: 02/25/23 10:02 AM   Result Value Ref Range    Sodium 134 (L) 135 - 145 MMOL/L    Potassium 3.9 3.5 - 5.1 MMOL/L    Chloride 99 99 - 110 mMol/L    CO2 21 21 - 32 MMOL/L    BUN 30 (H) 6 - 23 MG/DL    Creatinine 1.8 (H) 0.9 - 1.3 MG/DL    Est, Glom Filt Rate 39 (L) >60 mL/min/1.73m2    Glucose 136 (H) 70 - 99 MG/DL    Calcium 8.9 8.3 - 10.6 MG/DL    Albumin 3.6 3.4 - 5.0 GM/DL    Total Protein 5.4 (L) 6.4 - 8.2 GM/DL    Total Bilirubin 0.7 0.0 - 1.0 MG/DL    ALT 9 (L) 10 - 40 U/L AST 16 15 - 37 IU/L    Alkaline Phosphatase 63 40 - 128 IU/L    Anion Gap 14 4 - 16   Procalcitonin    Collection Time: 02/25/23 10:02 AM   Result Value Ref Range    Procalcitonin 20.86    Protime-INR    Collection Time: 02/25/23 10:02 AM   Result Value Ref Range    Protime 11.4 (L) 11.7 - 14.5 SECONDS    INR 0.88 INDEX        Imaging/Diagnostics Last 24 Hours   XR CHEST PORTABLE    Result Date: 2/24/2023  EXAMINATION: ONE XRAY VIEW OF THE CHEST 2/24/2023 6:37 pm COMPARISON: 11/22/2022 HISTORY: ORDERING SYSTEM PROVIDED HISTORY: shortness of breath TECHNOLOGIST PROVIDED HISTORY: Reason for exam:->shortness of breath Reason for Exam: shortness of breath Additional signs and symptoms: na Relevant Medical/Surgical History: bladder, prostate and right kidney cancer FINDINGS: New right lung base consolidation with blunting of the costophrenic sulcus. Left lung clear. No pneumothorax or left pleural effusion. Cardiac and mediastinal contours stable. No acute osseous abnormality. Findings of right lung base pneumonia with suspected small layering right parapneumonic effusion.        Electronically signed by Brad Waters MD on 2/25/2023 at 8:14 PM

## 2023-02-26 NOTE — CONSULTS
Subjective:   CHIEF COMPLAINT / HPI:  74year old male with increasing sob and off and on wheeze. He felt very weak and became bedridden at home. He has cough with occasional yellow sputum      Past Medical History:  Past Medical History:   Diagnosis Date    CAD (coronary artery disease)     Sees Dr. Pierre Siemens [facility])     bladder, prostate and right kidney    Carotid stenosis     Chronic back pain     low back    COPD (chronic obstructive pulmonary disease) (HCC)     Erectile dysfunction     Fatigue     Hematuria, gross     Bladder tumor surgery scheduled for 6/30/14.     History of external beam radiation therapy 2017    Prostate 6,600 cGy per  at [facility]    Hyperlipidemia     Hypertension     MI (mitral incompetence)     MI (myocardial infarction) ([facility] [address] 2010    Osteoarthritis     low back    Peyronie's disease     Pneumothorax 2002    hemothorax - s/p fall - had chest tube    Retinal detachment     left    Sciatica        Past Surgical History:        Procedure Laterality Date    APPENDECTOMY  1957    BLADDER TUMOR EXCISION  06/30/14    TURB w r stent placement    CARDIOVASCULAR STRESS TEST  6/2011 & 6/23/2014    COLONOSCOPY  10/16/2014    polyps times seven, diverticulosis    EYE SURGERY  2011    cataract removal    EYE SURGERY  2004    retinal detachment    INCISIONAL HERNIA REPAIR N/A 12/10/2015    NASAL HEMORRHAGE CONTROL  1991    PROSTATECTOMY N/A 12/10/2015    robotic assisted laporascopic    TONSILLECTOMY AND ADENOIDECTOMY  childhood    TOTAL NEPHRECTOMY Right 2018    at [address] [facility] N/A 12/10/2015       Current Medications:    Current Facility-Administered Medications: predniSONE (DELTASONE) tablet 40 mg, 40 mg, Oral, Daily  0.9 % sodium chloride infusion, , IntraVENous, Continuous  aspirin EC tablet 81 mg, 81 mg, Oral, Daily  atorvastatin (LIPITOR) tablet 40 mg, 40 mg, Oral, Nightly  dapsone tablet 100 mg, 100 mg, Oral, Daily  doxepin (SINEQUAN) capsule 25 mg, 25 mg, Oral, Nightly  budesonide-formoterol (SYMBICORT) 160-4.5 MCG/ACT inhaler 2 puff, 2 puff, Inhalation, BID  metoprolol tartrate (LOPRESSOR) tablet 25 mg, 25 mg, Oral, BID  pantoprazole (PROTONIX) tablet 40 mg, 40 mg, Oral, Daily  tamsulosin (FLOMAX) capsule 0.4 mg, 0.4 mg, Oral, Daily  sodium chloride flush 0.9 % injection 5-40 mL, 5-40 mL, IntraVENous, 2 times per day  sodium chloride flush 0.9 % injection 5-40 mL, 5-40 mL, IntraVENous, PRN  0.9 % sodium chloride infusion, , IntraVENous, PRN  potassium chloride 10 mEq/100 mL IVPB (Peripheral Line), 10 mEq, IntraVENous, PRN  magnesium sulfate 2000 mg in 50 mL IVPB premix, 2,000 mg, IntraVENous, PRN  enoxaparin (LOVENOX) injection 40 mg, 40 mg, SubCUTAneous, QPM  ondansetron (ZOFRAN-ODT) disintegrating tablet 4 mg, 4 mg, Oral, Q8H PRN **OR** ondansetron (ZOFRAN) injection 4 mg, 4 mg, IntraVENous, Q6H PRN  polyethylene glycol (GLYCOLAX) packet 17 g, 17 g, Oral, Daily PRN  nicotine (NICODERM CQ) 21 MG/24HR 1 patch, 1 patch, TransDERmal, Daily PRN  acetaminophen (TYLENOL) tablet 650 mg, 650 mg, Oral, Q6H PRN **OR** acetaminophen (TYLENOL) suppository 650 mg, 650 mg, Rectal, Q6H PRN  azithromycin (ZITHROMAX) tablet 500 mg, 500 mg, Oral, Daily  cefTRIAXone (ROCEPHIN) 2,000 mg in sodium chloride 0.9 % 50 mL IVPB (mini-bag), 2,000 mg, IntraVENous, Q24H    Allergies   Allergen Reactions    Lisinopril Other (See Comments)     Cough    Triamterene-Hydrochlorothiazide [Hydrochlorothiazide W-Triamterene] Rash       Social History:    Social History     Socioeconomic History    Marital status:      Spouse name: None    Number of children: None    Years of education: None    Highest education level: None   Occupational History    Occupation: [employer]   Tobacco Use    Smoking status: Every Day     Packs/day: 2.00     Years: 40.00     Pack years: 80.00     Types: Cigarettes    Smokeless tobacco: Current   Substance and Sexual Activity    Alcohol use:  Yes     Alcohol/week: 0.0 standard drinks     Comment: 2 vodka martinis daily      CAFFEINE: 2 cups coffee & can pop daily    Drug use: No     Social Determinants of Health     Financial Resource Strain: Low Risk     Difficulty of Paying Living Expenses: Not hard at all   Food Insecurity: No Food Insecurity    Worried About Running Out of Food in the Last Year: Never true    Ran Out of Food in the Last Year: Never true   Transportation Needs: Unknown    Lack of Transportation (Non-Medical): No   Housing Stability: Unknown    Unstable Housing in the Last Year: No       Family History:   Family History   Problem Relation Age of Onset    Cancer Mother         colon, kidney, liver    Prostate Cancer Brother     Depression Brother     High Blood Pressure Brother     Other Father         AAA    High Blood Pressure Father         possible    Substance Abuse Brother         alcohol, tobacco    Cancer Brother         prostate       Immunization:  Immunization History   Administered Date(s) Administered    COVID-19, MODERNA BLUE border, Primary or Immunocompromised, (age 12y+), IM, 100 mcg/0.5mL 02/19/2021, 03/25/2021    COVID-19, MODERNA Booster BLUE border, (age 18y+), IM, 50mcg/0.25mL 11/23/2021    Influenza Virus Vaccine 11/30/2015, 12/05/2018    Influenza Whole 12/04/2017    Influenza, FLUZONE (age 72 y+), High Dose, 0.7mL 11/01/2020, 11/23/2021    Influenza, High Dose (Fluzone 65 yrs and older) 12/05/2018, 11/17/2019    Pneumococcal Conjugate 13-valent (Fhctbxg01) 11/30/2015, 12/04/2017    Pneumococcal Conjugate 7-valent (Prevnar7) 09/06/2011    Pneumococcal Polysaccharide ([id number]) 12/24/2013, 12/05/2018, 12/05/2018    Tdap (Boostrix, Adacel) 01/03/2005         REVIEW OF SYSTEMS:    CONSTITUTIONAL:  negative for fevers, chills, diaphoresis, activity change, appetite change, fatigue, night sweats and unexpected weight change.    EYES:  negative for blurred vision, eye discharge, visual disturbance and icterus  HEENT:  negative for hearing loss, tinnitus, ear drainage, sinus pressure, nasal congestion, epistaxis and snoring  RESPIRATORY:  See HPI  CARDIOVASCULAR:  negative for chest pain, palpitations, exertional chest pressure/discomfort, edema, syncope  GASTROINTESTINAL:  negative for nausea, vomiting, diarrhea, constipation, blood in stool and abdominal pain  GENITOURINARY:  negative for frequency, dysuria and hematuria  HEMATOLOGIC/LYMPHATIC:  negative for easy bruising, bleeding and lymphadenopathy  ALLERGIC/IMMUNOLOGIC:  negative for recurrent infections, angioedema, anaphylaxis and drug reactions  ENDOCRINE:  negative for weight changes and diabetic symptoms including polyuria, polydipsia and polyphagia    MUSCULOSKELETAL:  negative for  pain, joint swelling, decreased range of motion and muscle weakness  NEUROLOGICAL:  negative for headaches, slurred speech, unilateral weakness  PSYCHIATRIC/BEHAVIORAL: negative for hallucinations, behavioral problems, confusion and agitation. Objective:   PHYSICAL EXAM:      VITALS:    Vitals:    02/25/23 2145 02/25/23 2200 02/26/23 0107 02/26/23 0554   BP:  99/61 98/62    Pulse: 99 94 71    Resp: 19 21 20    Temp:   97.1 °F (36.2 °C)    TempSrc:   Tympanic    SpO2:  92%     Weight:    190 lb 7 oz (86.4 kg)   Height:             CONSTITUTIONAL:  awake, alert, cooperative, no apparent distress, and appears stated age  NECK:  Supple, symmetrical, trachea midline, no adenopathy, thyroid symmetric, not enlarged and no tenderness  CHEST: Chest expansion equal and symmetrical, no intercostal retraction. LUNGS:  no increased work of breathing, has expiratory wheezes both lungs, no crackles. CARDIOVASCULAR: S1 and S2, no edema and no JVD  ABDOMEN:  normal bowel sounds, non-distended and no masses palpated, and no tenderness to palpation. No hepatospleenomegaly  LYMPHADENOPATHY:  no axillary or supraclavicular adenopathy. No cervical adnenopathy  PSYCHIATRIC: Oriented to person place and time.  No obvious depression or anxiety. MUSCULOSKELETAL: No obvious misalignment or effusion of the joints. No clubbing, cyanosis of the digits. RIGHT AND LEFT LOWER EXTREMITIES: No edema, no inflammation, no tenderness. SKIN:  normal skin color, texture, turgor and no redness, warmth, or swelling. No palpable nodules    DATA:       EXAMINATION:   CTA OF THE CHEST 11/22/2022 2:47 pm       TECHNIQUE:   CTA of the chest was performed after the administration of intravenous   contrast.  Multiplanar reformatted images are provided for review. MIP   images are provided for review. Automated exposure control, iterative   reconstruction, and/or weight based adjustment of the mA/kV was utilized to   reduce the radiation dose to as low as reasonably achievable. COMPARISON:   Abdomen and pelvis CT, 01/24/2017       HISTORY:   ORDERING SYSTEM PROVIDED HISTORY: dyspnea hx of renal cell ca   TECHNOLOGIST PROVIDED HISTORY:   Reason for exam:->dyspnea hx of renal cell ca   Reason for Exam: dyspnea hx of renal cell ca       FINDINGS:   Pulmonary Arteries: The pulmonary arteries are adequately opacified. No   filling defects are seen within the pulmonary arteries to suggest pulmonary   embolism. Mediastinum: Visualized thyroid unremarkable. No mediastinal or hilar   lymphadenopathy. Esophagus unremarkable. Cardiac chambers unremarkable. Thoracic aorta is at the upper limits of   normal in caliber, but is otherwise unremarkable, without evidence of   dissection. Lungs/pleura: Elevation of the right hemidiaphragm is seen, similar when   compared to the previous exam.  Adjacent atelectasis is noted. An acute infiltrate is not identified. There is diffuse bronchial wall   thickening. No filling defects are seen within the airways. All of these findings are seen superimposed on a background emphysema. Upper Abdomen: Limited images of the upper abdomen are unremarkable.        Soft Tissues/Bones: Chronic right-sided rib fractures are noted. Multilevel   chronic appearing compression fractures are seen within the thoracic spine. No paravertebral edema is identified. No acute bony abnormality identified. Impression   No evidence of pulmonary embolism or dissection. Bronchial wall thickening, which may be seen in inflammatory conditions such   as bronchitis, reactive airways disease, pulmonary vascular congestion, and   smoking. Assessment:      Ac hypoxic resp failure  Pneumonia CA RLL  Ac copd severe          Plan:     D/w pt  Advised to quit smoking and help[ offered  Continue symbicort  On prednisone will switch to iv if condition dont improve  Cx  Agree with rocephin and zithromax  Thanks will follow

## 2023-02-26 NOTE — PLAN OF CARE
Problem: Discharge Planning  Goal: Discharge to home or other facility with appropriate resources  2/25/2023 2223 by Deshawn Shaffer RN  Outcome: Progressing  2/25/2023 1024 by Evaristo Feldman RN  Outcome: Progressing  Flowsheets (Taken 2/24/2023 2210 by Ignacio Bonilla, RN)  Discharge to home or other facility with appropriate resources: Identify barriers to discharge with patient and caregiver     Problem: ABCDS Injury Assessment  Goal: Absence of physical injury  Outcome: Progressing     Problem: Chronic Conditions and Co-morbidities  Goal: Patient's chronic conditions and co-morbidity symptoms are monitored and maintained or improved  Outcome: Progressing  Flowsheets (Taken 2/25/2023 2223)  Care Plan - Patient's Chronic Conditions and Co-Morbidity Symptoms are Monitored and Maintained or Improved: Monitor and assess patient's chronic conditions and comorbid symptoms for stability, deterioration, or improvement

## 2023-02-27 VITALS
RESPIRATION RATE: 14 BRPM | WEIGHT: 196 LBS | OXYGEN SATURATION: 92 % | SYSTOLIC BLOOD PRESSURE: 117 MMHG | HEART RATE: 76 BPM | HEIGHT: 73 IN | TEMPERATURE: 98.7 F | BODY MASS INDEX: 25.98 KG/M2 | DIASTOLIC BLOOD PRESSURE: 63 MMHG

## 2023-02-27 LAB
ANION GAP SERPL CALCULATED.3IONS-SCNC: 9 MMOL/L (ref 4–16)
BUN SERPL-MCNC: 29 MG/DL (ref 6–23)
CALCIUM SERPL-MCNC: 8.4 MG/DL (ref 8.3–10.6)
CHLORIDE BLD-SCNC: 105 MMOL/L (ref 99–110)
CO2: 22 MMOL/L (ref 21–32)
CREAT SERPL-MCNC: 1.2 MG/DL (ref 0.9–1.3)
GFR SERPL CREATININE-BSD FRML MDRD: >60 ML/MIN/1.73M2
GLUCOSE SERPL-MCNC: 147 MG/DL (ref 70–99)
HCT VFR BLD CALC: 31.7 % (ref 42–52)
HEMOGLOBIN: 10 GM/DL (ref 13.5–18)
L PNEUMO AG UR QL IA: NEGATIVE
MAGNESIUM: 2.2 MG/DL (ref 1.8–2.4)
MCH RBC QN AUTO: 34.2 PG (ref 27–31)
MCHC RBC AUTO-ENTMCNC: 31.5 % (ref 32–36)
MCV RBC AUTO: 108.6 FL (ref 78–100)
PDW BLD-RTO: 13.2 % (ref 11.7–14.9)
PHOSPHORUS: 2.3 MG/DL (ref 2.5–4.9)
PLATELET # BLD: 211 K/CU MM (ref 140–440)
PMV BLD AUTO: 9.7 FL (ref 7.5–11.1)
POTASSIUM SERPL-SCNC: 4.2 MMOL/L (ref 3.5–5.1)
PROCALCITONIN SERPL-MCNC: 8.36 NG/ML
RBC # BLD: 2.92 M/CU MM (ref 4.6–6.2)
S PNEUM AG CSF QL: NORMAL
SODIUM BLD-SCNC: 136 MMOL/L (ref 135–145)
WBC # BLD: 11.2 K/CU MM (ref 4–10.5)

## 2023-02-27 PROCEDURE — 94640 AIRWAY INHALATION TREATMENT: CPT

## 2023-02-27 PROCEDURE — 94618 PULMONARY STRESS TESTING: CPT

## 2023-02-27 PROCEDURE — 94761 N-INVAS EAR/PLS OXIMETRY MLT: CPT

## 2023-02-27 PROCEDURE — 83735 ASSAY OF MAGNESIUM: CPT

## 2023-02-27 PROCEDURE — 2580000003 HC RX 258: Performed by: STUDENT IN AN ORGANIZED HEALTH CARE EDUCATION/TRAINING PROGRAM

## 2023-02-27 PROCEDURE — 80048 BASIC METABOLIC PNL TOTAL CA: CPT

## 2023-02-27 PROCEDURE — 36415 COLL VENOUS BLD VENIPUNCTURE: CPT

## 2023-02-27 PROCEDURE — 85027 COMPLETE CBC AUTOMATED: CPT

## 2023-02-27 PROCEDURE — 2700000000 HC OXYGEN THERAPY PER DAY

## 2023-02-27 PROCEDURE — 6370000000 HC RX 637 (ALT 250 FOR IP): Performed by: STUDENT IN AN ORGANIZED HEALTH CARE EDUCATION/TRAINING PROGRAM

## 2023-02-27 PROCEDURE — 6370000000 HC RX 637 (ALT 250 FOR IP): Performed by: NURSE PRACTITIONER

## 2023-02-27 PROCEDURE — 84145 PROCALCITONIN (PCT): CPT

## 2023-02-27 PROCEDURE — 84100 ASSAY OF PHOSPHORUS: CPT

## 2023-02-27 PROCEDURE — 6370000000 HC RX 637 (ALT 250 FOR IP): Performed by: INTERNAL MEDICINE

## 2023-02-27 RX ORDER — AZITHROMYCIN 250 MG/1
500 TABLET, FILM COATED ORAL DAILY
Status: DISCONTINUED | OUTPATIENT
Start: 2023-02-27 | End: 2023-02-27 | Stop reason: HOSPADM

## 2023-02-27 RX ORDER — CEFDINIR 300 MG/1
300 CAPSULE ORAL 2 TIMES DAILY
Qty: 8 CAPSULE | Refills: 0 | Status: SHIPPED | OUTPATIENT
Start: 2023-02-27 | End: 2023-03-03

## 2023-02-27 RX ORDER — AZITHROMYCIN 500 MG/1
500 TABLET, FILM COATED ORAL DAILY
Qty: 2 TABLET | Refills: 0 | Status: SHIPPED | OUTPATIENT
Start: 2023-02-27 | End: 2023-03-01

## 2023-02-27 RX ORDER — PREDNISONE 20 MG/1
40 TABLET ORAL DAILY
Qty: 2 TABLET | Refills: 0 | Status: SHIPPED | OUTPATIENT
Start: 2023-02-28 | End: 2023-03-01

## 2023-02-27 RX ADMIN — TAMSULOSIN HYDROCHLORIDE 0.4 MG: 0.4 CAPSULE ORAL at 08:35

## 2023-02-27 RX ADMIN — PANTOPRAZOLE SODIUM 40 MG: 40 TABLET, DELAYED RELEASE ORAL at 06:39

## 2023-02-27 RX ADMIN — ASPIRIN 81 MG: 81 TABLET, COATED ORAL at 08:35

## 2023-02-27 RX ADMIN — ALBUTEROL SULFATE 2 PUFF: 90 AEROSOL, METERED RESPIRATORY (INHALATION) at 11:50

## 2023-02-27 RX ADMIN — BUDESONIDE AND FORMOTEROL FUMARATE DIHYDRATE 2 PUFF: 160; 4.5 AEROSOL RESPIRATORY (INHALATION) at 07:52

## 2023-02-27 RX ADMIN — PREDNISONE 40 MG: 20 TABLET ORAL at 08:35

## 2023-02-27 RX ADMIN — DAPSONE 100 MG: 25 TABLET ORAL at 08:37

## 2023-02-27 RX ADMIN — METOPROLOL TARTRATE 12.5 MG: 25 TABLET, FILM COATED ORAL at 08:35

## 2023-02-27 RX ADMIN — SODIUM CHLORIDE, PRESERVATIVE FREE 10 ML: 5 INJECTION INTRAVENOUS at 08:35

## 2023-02-27 RX ADMIN — ALBUTEROL SULFATE 2 PUFF: 90 AEROSOL, METERED RESPIRATORY (INHALATION) at 07:50

## 2023-02-27 NOTE — PROGRESS NOTES
Outpatient Pharmacy Progress Note for Meds-to-Beds    Total number of Prescriptions Filled: 4  The following medications were dispensed to the patient during the discharge process:  Azithromycin  Cefdinir  Metoprolol tartrate  Prednisone    Additional Documentation:  Patient picked-up the medication(s) in the OP Pharmacy      Thank you for letting us serve your patients.   [address] [place]    [address], [address] [facility]    Phone: [phone]    Fax: [phone]

## 2023-02-27 NOTE — PLAN OF CARE
Problem: Discharge Planning  Goal: Discharge to home or other facility with appropriate resources  Outcome: Adequate for Discharge     Problem: ABCDS Injury Assessment  Goal: Absence of physical injury  Outcome: Adequate for Discharge     Problem: Chronic Conditions and Co-morbidities  Goal: Patient's chronic conditions and co-morbidity symptoms are monitored and maintained or improved  Outcome: Adequate for Discharge

## 2023-02-27 NOTE — CARE COORDINATION
.Case Management Assessment  Initial Evaluation    Date/Time of Evaluation: 2/27/2023 3:25 PM  Assessment Completed by: Juan Luis Genao RN    If patient is discharged prior to next notation, then this note serves as note for discharge by case management. Patient Name: Esperanza Pedroza                   YOB: 1950  Diagnosis: Acute respiratory failure with hypoxia ([address] [J96.01]  Pneumonia of right lower lobe due to infectious organism [J18.9]  Community acquired pneumonia of right lower lobe of lung [J18.9]                   Date / Time: 2/24/2023  6:00 PM    Patient Admission Status: Inpatient   Readmission Risk (Low < 19, Mod (19-27), High > 27): Readmission Risk Score: 14.2    Current PCP: Jayne Mcbride MD  PCP verified by CM? Yes    Chart Reviewed: Yes      History Provided by: Patient  Patient Orientation: Alert and Oriented    Patient Cognition: Alert    Hospitalization in the last 30 days (Readmission):  No    If yes, Readmission Assessment in  Navigator will be completed. Advance Directives:      Code Status: Full Code   Patient's Primary Decision Maker is:  (SELF)    Primary Decision Maker: Sabine Fernandez - Spouse - [phone]    Discharge Planning:    Patient lives with: Spouse/Significant Other Type of Home: House  Primary Care Giver: Self  Patient Support Systems include: Spouse/Significant Other   Current Financial resources: Medicare  Current community resources: None  Current services prior to admission: None            Current DME:              Type of Home Care services:  None    ADLS  Prior functional level: Independent in ADLs/IADLs  Current functional level: Independent in ADLs/IADLs    PT AM-PAC:   /24  OT AM-PAC:   /24    Family can provide assistance at DC: Yes  Would you like Case Management to discuss the discharge plan with any other family members/significant others, and if so, who?  No  Plans to Return to Present Housing: Yes  Other Identified Issues/Barriers to RETURNING to current housing: NONE  Potential Assistance needed at discharge: N/A            Potential DME:    Patient expects to discharge to: [facility] for transportation at discharge:      Financial    Payor: Favian Contreras / Plan: MEDICARE PART A AND B / Product Type: *No Product type* /     Does insurance require precert for SNF: No    Potential assistance Purchasing Medications: No  Meds-to-Beds request: Yes      [facility] [address] [facility] [address] [phone] - f [phone]  [facility] [address] [facility]  Phone: [phone] Fax: [phone]      Notes:    Factors facilitating achievement of predicted outcomes: Family support, Cooperative, Pleasant, and Good insight into deficits    Barriers to discharge: NONE    Additional Case Management Notes: . CM met with pt for d/c planning. Introduced self and updated white board. Pt lives with spouse and is independent with ADL's. Pt drives, has a PCP, has insurance, and is able to afford his medication. Pt has a walker and a shower seat. C offered and pt refused. Pt denies any d/c needs at this time. D/C PLAN IS HOME WITH SPOUSE, NO NEEDS. NOTIFY CM IF ANY D/C NEEDS ARISE. TE    The Plan for Transition of Care is related to the following treatment goals of Acute respiratory failure with hypoxia ([address] [J96.01]  Pneumonia of right lower lobe due to infectious organism [J18.9]  Community acquired pneumonia of right lower lobe of lung [O13.8]    IF APPLICABLE: The Patient and/or patient representative Leigh Ann Wolff and his family were provided with a choice of provider and agrees with the discharge plan. Freedom of choice list with basic dialogue that supports the patient's individualized plan of care/goals and shares the quality data associated with the providers was provided to:     Patient Representative Name:       The Patient and/or Patient Representative Agree with the Discharge Plan?       Ronald Loyola RN  Case Management Department

## 2023-02-27 NOTE — CONSULTS
[address] [place] [address], [address] [facility]                                  CONSULTATION    PATIENT NAME: Grisel Delgado                    :        1950  MED REC NO:   [id number]                          ROOM:       [id number]  ACCOUNT NO:   [de-identified]                           ADMIT DATE: 2023  PROVIDER:     Willie Gates    CONSULT DATE:  2023    INDICATIONS:  Shortness of breath and elevated ProBNP levels. HISTORY OF PRESENT ILLNESS:  The patient is a 15-year-old male with a  past medical history of CAD and COPD, who came into the hospital for  ongoing shortness of breath. Chest x-ray was done at that time and it  showed right lung base pneumonia with suspected small airway  parapneumonic effusion. At that time, he has been giving antibiotics  and sent back home. Respiratory panel has been negative. ProBNP levels  were mildly elevated at 757. Some IV Lasix was given in the emergency  department. He was seen today. He states his breathing today is little  bit better. He has been working on quitting smoking. He denies any  sort of chest pain. No dizziness or lightheadedness. No palpitations. Yesterday, his blood pressure was going low and his heart rate was being  elevated and concerned from that perspective as well. He also has a  past history of having bullous pemphigoid, has been on long-term  steroids for that following _____. White blood count is at 15.2. Troponin has not been elevated. EKG reviewed, no ischemic changes. His  last echo was done on 2022. At that time, EF was at 56%. Mild-to-moderate MR, mild AR was noted. His last stress test was on  2022. At that time, no ischemia was noted on the findings.     PAST MEDICAL HISTORY:  CAD; bladder, prostate, and right kidney CA;  carotid stenosis; chronic back pain; COPD; fatigue; hematuria;  hypertension; hyperlipidemia; osteoarthritis. PAST SURGICAL HISTORY:  Appendectomy, bladder tumor excision, hernia  repair, prostatectomy, tonsillectomy and adenoidectomy, right total  nephrectomy. SOCIAL HISTORY:  He is a current every day smoker. Also has regular  alcohol use. No illicit drugs. ALLERGIES:  LISINOPRIL, _____, TRIAMTERENE, and HYDROCHLOROTHIAZIDE. HOME MEDICATIONS:  He is on Lasix 20 mg every other day, _____  Lopressor, baby aspirin, Lipitor, Protonix, Flomax, Advair, Dapsone, and  Doxil. REVIEW OF SYSTEMS:  A 10-point review of system is otherwise negative  unless noted above in the HPI. PHYSICAL EXAMINATION:  VITAL SIGNS:  The patient is afebrile at 97.1. Respirations at 20. Pulse is of 71. Blood pressure 98/62. He is saturating at 94% on 3  liters. GENERAL:  The patient is awake and alert, answering questions  appropriately. He does not appear to be in any acute distress. HEENT:  Head is normocephalic and atraumatic. Pupils are equal and  reactive to light. CHEST:  Equal and expansive. LUNGS:  Diminished in base, right sided. HEART:  Rate and rhythm irregular. No clicks, gallops, or murmurs  noted. ABDOMEN:  Soft and nontender. Bowel sounds are present in all four  quadrants. No hepatosplenomegaly or guarding noted. EXTREMITIES: No clubbing, cyanosis, or edema noted. NEUROLOGICAL:  Cranial nerves II through XII are within normal limits. LABORATORY DATA:  Creatinine is at 1.8; Renal is following. Potassium  at 4.9. ProBNP was at 757. Troponin was not elevated. White blood  cell count was at 15.2. Viral panel was negative. IMPRESSION AND PLAN:  The patient is a 68-year-old male who presented to  the hospital with an acute shortness of breath and pneumonia. He is  also becoming hypotensive and tachypneic with it. At this time, we will  reduce the Lopressor 12.5 mg b.i.d. He has just had an echo done in  November and that was stable. No need to repeat.   We will continue to  follow alongside the case and make recommendations when appropriate. This plan was discussed with Dr. Greg Mccall.         Татьяна Quiros    D: 02/26/2023 10:00:12       T: 02/26/2023 14:45:56     [email or web address]  Job#: [id number]     Doc#: [id number]    CC:

## 2023-02-27 NOTE — DISCHARGE INSTRUCTIONS
- please schedule an appointment to see your PCP  - please schedule an appointment to see your pulmonologist, cardiologist and nephrologist  - please go to lab in one week for blood work  - please take medications as prescribed

## 2023-02-27 NOTE — PROGRESS NOTES
Ambulated patient around nurses station. Patient remained on RA. Spo2 stayed at 90-92% for duration of ambulation.

## 2023-02-27 NOTE — PROGRESS NOTES
Pt did not qualify for oxygen @ rest or during ambulation. IF patient is still admitted, an overnight trending will be done.  If not, this can be done outpatient if desired

## 2023-02-27 NOTE — PROGRESS NOTES
Nephrology Progress Note        [address], [address] [address]  Phone: [phone]  Office Hours: 8:30AM - 4:30PM  Monday - Friday 2/27/2023 9:18 AM  Subjective:   Admit Date: 2/24/2023  PCP: Jordan Patricio MD  Interval History: On nc  Doing well  Eating breakfast    Diet: ADULT DIET; Regular      Data:   Scheduled Meds:   cefTRIAXone (ROCEPHIN) IV  2,000 mg IntraVENous Q24H    azithromycin  500 mg Oral Daily    metoprolol tartrate  12.5 mg Oral BID    albuterol sulfate HFA  2 puff Inhalation Q4H WA    And    ipratropium  2 puff Inhalation Q4H While awake    budesonide-formoterol  2 puff Inhalation BID    predniSONE  40 mg Oral Daily    aspirin  81 mg Oral Daily    atorvastatin  40 mg Oral Nightly    dapsone  100 mg Oral Daily    doxepin  25 mg Oral Nightly    pantoprazole  40 mg Oral Daily    tamsulosin  0.4 mg Oral Daily    sodium chloride flush  5-40 mL IntraVENous 2 times per day    enoxaparin  40 mg SubCUTAneous QPM     Continuous Infusions:   sodium chloride       PRN Meds:sodium chloride flush, sodium chloride, potassium chloride, magnesium sulfate, ondansetron **OR** ondansetron, polyethylene glycol, nicotine, acetaminophen **OR** acetaminophen  I/O last 3 completed shifts: In: 2508 [P. O.:780; I.V.:1728]  Out: 1425 [Urine:1425]  I/O this shift:  In: -   Out: 250 [Urine:250]    Intake/Output Summary (Last 24 hours) at 2/27/2023 0918  Last data filed at 2/27/2023 0704  Gross per 24 hour   Intake 2257.97 ml   Output 1175 ml   Net 1082.97 ml       CBC:   Recent Labs     02/24/23  1847 02/25/23  1002 02/27/23  0122   WBC 15.0* 15.2* 11.2*   HGB 13.1* 11.7* 10.0*    223 211       BMP:    Recent Labs     02/25/23  1002 02/26/23  0949 02/27/23  0122   * 138 136   K 3.9 4.2 4.2   CL 99 104 105   CO2 21 25 22   BUN 30* 31* 29*   CREATININE 1.8* 1.4* 1.2   GLUCOSE 136* 170* 147*     Hepatic:   Recent Labs     02/25/23  1002   AST 16   ALT 9*   BILITOT 0.7 ALKPHOS 63     Troponin: No results for input(s): TROPONINI in the last 72 hours. BNP: No results for input(s): BNP in the last 72 hours. Lipids: No results for input(s): CHOL, HDL in the last 72 hours.     Invalid input(s): LDLCALCU  ABGs:   Lab Results   Component Value Date/Time    PO2ART 73 11/22/2022 02:00 PM    JFH5KGH 41.0 11/22/2022 02:00 PM     INR:   Recent Labs     02/25/23  1002   INR 0.88       Objective:   Vitals: /71   Pulse 76   Temp 98.4 °F (36.9 °C) (Oral)   Resp 14   Ht 6' 1\" (1.854 m)   Wt 196 lb (88.9 kg)   SpO2 92%   BMI 25.86 kg/m²   General appearance: alert and cooperative with exam, in no acute distress  HEENT: normocephalic, atraumatic,   Neck: supple, trachea midline  Lungs: breathing comfortably on nc  Heart[de-identified] regular rate and rhythm,  Extremities: extremities atraumatic, no cyanosis or edema  Neurologic: alert, oriented, follows commands, interactive    MEDICAL DECISION MAKING     Patient Active Problem List    Diagnosis Date Noted    Community acquired pneumonia of right lower lobe of lung 02/24/2023    Peripheral edema 02/21/2023    Acute respiratory failure with hypoxia ([address] 11/22/2022    Hordeolum externum of right lower eyelid 10/07/2017    Urinary frequency 06/06/2017    GERD (gastroesophageal reflux disease) 06/06/2017    Prostate cancer ([address] 11/30/2015    History of MI (myocardial infarction) 07/23/2015    CAD (coronary artery disease)     Tobacco use 08/04/2014    ED (erectile dysfunction) 08/04/2014    Essential hypertension 06/09/2014    Dyslipidemia 06/09/2014    COPD, severe ([address] 11/20/2013     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

## 2023-02-27 NOTE — PROGRESS NOTES
Daily Progress Note  Subjective:  Awake and alert; up in chair  Pt. Feeling well  SOB improving  BP and HR stable; NSR on tele  Echo today  Stable from cardiac standpoint    Attending Note:    Awake alert no chest pain  Heart rate and BP stable  Remain in sinus  He is feeling better  Came in with dyspnea and pneumonia  He has a  hx of COPD seen by pulmonary   Cr is stable -=renal on case   Possible pneumonia   Echo pending   Hx of right nephrectomy  Stress test 9/22-negative for ischemia       Impression and Plan:   Acute hypoxic respiratory failure    CXR form 2/24/23 suggest right lung PNA    WBC 15.2 at peak    BC negative at 48 hours    ABX per primary    Pulm is following    Hx of COPD as well    PO steroids    SOB is improving, down to 2L NC sating well    Will obtain echo    Tachycardia; Likely secondary to acute infection    Restarted metoprolol at 12.5mg dosing, tolerating it well    Will cont' this dose as HR is currently in the 60's    Echo pending    Hx of CAD    Cont aspirin and statin     Lopressor 12.5mg BID    Most Recent Echo  Echo 11/22/22   Technically difficult examination due to poor acoustical windows from COPD   exacerbation. Left ventricular systolic function is normal.   Ejection fraction is visually estimated at 50-55%. Aortic valve leaflets are somewhat thickened. Mild aortic insufficiency with PHT of 593 msec. Mild tricuspid regurgitation; RVSP: 45 mmHg consistent with PTHN. No evidence of any pericardial effusion. Most Recent Stress test  Stress 09/21/2022  EF 66%, ESV 36, No ischemia. No ischemia Artifacts decrease sensitivity of this finding. Compared to study of 8/10/2020, no changes. .    [id number] Score for Atrial Fibrillation Stroke Risk   Risk   Factors  Component Value   C CHF No 0   H HTN Yes 1   A2 Age >= 75 No,  (69 y.o.) 0   D DM No 0   S2 Prior Stroke/TIA No 0   V Vascular Disease No 0   A Age 74-69 Yes,  (69 y.o.) 1   Sc Sex male 0 [id number]  Score  2   Score last updated 2/27/23 26:99 AM EST    Click here for a link to the UpToDate guideline \"Atrial Fibrillation: Anticoagulation therapy to prevent embolization    Disclaimer: Risk Score calculation is dependent on accuracy of patient problem list and past encounter diagnosis. PAST MEDICAL HISTORY:  CAD; bladder, prostate, and right kidney CA;  carotid stenosis; chronic back pain; COPD; fatigue; hematuria;  hypertension; hyperlipidemia; osteoarthritis. PAST SURGICAL HISTORY:  Appendectomy, bladder tumor excision, hernia  repair, prostatectomy, tonsillectomy and adenoidectomy, right total  nephrectomy. SOCIAL HISTORY:  He is a current every day smoker. Also has regular  alcohol use. No illicit drugs. ALLERGIES:  LISINOPRIL, _____, TRIAMTERENE, and HYDROCHLOROTHIAZIDE. HOME MEDICATIONS:  He is on Lasix 20 mg every other day, _____  Lopressor, baby aspirin, Lipitor, Protonix, Flomax, Advair, Dapsone, and  Doxil.      Objective:   /71   Pulse 76   Temp 98.4 °F (36.9 °C) (Oral)   Resp 14   Ht 6' 1\" (1.854 m)   Wt 196 lb (88.9 kg)   SpO2 92%   BMI 25.86 kg/m²     Intake/Output Summary (Last 24 hours) at 2/27/2023 1050  Last data filed at 2/27/2023 0704  Gross per 24 hour   Intake 2017.97 ml   Output 875 ml   Net 1142.97 ml       Medications:   Scheduled Meds:   cefTRIAXone (ROCEPHIN) IV  2,000 mg IntraVENous Q24H    azithromycin  500 mg Oral Daily    metoprolol tartrate  12.5 mg Oral BID    albuterol sulfate HFA  2 puff Inhalation Q4H WA    And    ipratropium  2 puff Inhalation Q4H While awake    budesonide-formoterol  2 puff Inhalation BID    predniSONE  40 mg Oral Daily    aspirin  81 mg Oral Daily    atorvastatin  40 mg Oral Nightly    dapsone  100 mg Oral Daily    doxepin  25 mg Oral Nightly    pantoprazole  40 mg Oral Daily    tamsulosin  0.4 mg Oral Daily    sodium chloride flush  5-40 mL IntraVENous 2 times per day    enoxaparin  40 mg SubCUTAneous QPM Infusions:   sodium chloride        PRN Meds:  sodium chloride flush, sodium chloride, potassium chloride, magnesium sulfate, ondansetron **OR** ondansetron, polyethylene glycol, nicotine, acetaminophen **OR** acetaminophen     Physical Exam:  Vitals:    02/27/23 0800   BP:    Pulse:    Resp:    Temp: 98.4 °F (36.9 °C)   SpO2:         General: AAO, NAD  Chest: Nontender  Cardiac: First and Second Heart Sounds are Normal, No Murmurs or Gallops noted  Lungs:Clear to auscultation and percussion. Abdomen: Soft, NT, ND, +BS  Extremities: No clubbing, no edema  Vascular:  Equal 2+ peripheral pulses. Lab Data:  CBC:   Recent Labs     02/24/23  1847 02/25/23  1002 02/27/23  0122   WBC 15.0* 15.2* 11.2*   HGB 13.1* 11.7* 10.0*   HCT 41.5* 36.4* 31.7*   .4* 107.1* 108.6*    223 211     BMP:   Recent Labs     02/25/23  1002 02/26/23  0949 02/27/23  0122   * 138 136   K 3.9 4.2 4.2   CL 99 104 105   CO2 21 25 22   PHOS  --  2.4* 2.3*   BUN 30* 31* 29*   CREATININE 1.8* 1.4* 1.2     LIVER PROFILE:   Recent Labs     02/25/23  1002   AST 16   ALT 9*   BILITOT 0.7   ALKPHOS 63     PT/INR:   Recent Labs     02/25/23  1002   PROTIME 11.4*   INR 0.88     APTT: No results for input(s): APTT in the last 72 hours. BNP:  No results for input(s): BNP in the last 72 hours.       Assessment:  Patient Active Problem List    Diagnosis Date Noted    Community acquired pneumonia of right lower lobe of lung 02/24/2023    Peripheral edema 02/21/2023    Acute respiratory failure with hypoxia ([facility] 11/22/2022    Hordeolum externum of right lower eyelid 10/07/2017    Urinary frequency 06/06/2017    GERD (gastroesophageal reflux disease) 06/06/2017    Prostate cancer ([facility] 11/30/2015    History of MI (myocardial infarction) 07/23/2015    CAD (coronary artery disease)     Tobacco use 08/04/2014    ED (erectile dysfunction) 08/04/2014    Essential hypertension 06/09/2014    Dyslipidemia 06/09/2014    COPD, severe ([facility] 11/20/2013       Electronically signed by TERESITA Millan CNP on 2/27/2023 at 10:50 AM    Electronically signed by Magdaleno Sagastume MD on 2/27/23 at 11:29 AM EST

## 2023-02-27 NOTE — PROGRESS NOTES
pulmonary      SUBJECTIVE:  he feels good     OBJECTIVE    VITALS:  /63   Pulse 76   Temp 98.7 °F (37.1 °C) (Oral)   Resp 14   Ht 6' 1\" (1.854 m)   Wt 196 lb (88.9 kg)   SpO2 92%   BMI 25.86 kg/m²   HEAD AND FACE EXAM:  No throat injection, no active exudate,no thrush  NECK EXAM;No JVD, no masses, symmetrical  CHEST EXAM; Expansion equal and symmetrical, no masses  LUNG EXAM; Good breath sounds bilaterally. There are expiratory wheezes both lungs, there are crackles at both lung bases  CARDIOVASCULAR EXAM: Positive S1 and S2, no S3 or S4, no clicks ,no murmurs  RIGHT AND LEFT LOWER EXTRIMITY EXAM: No edema, no swelling, no inflamation  CNS EXAM: Alert and oriented X3          LABS   Lab Results   Component Value Date    WBC 11.2 (H) 02/27/2023    HGB 10.0 (L) 02/27/2023    HCT 31.7 (L) 02/27/2023    .6 (H) 02/27/2023     02/27/2023     Lab Results   Component Value Date    CREATININE 1.2 02/27/2023    BUN 29 (H) 02/27/2023     02/27/2023    K 4.2 02/27/2023     02/27/2023    CO2 22 02/27/2023     Lab Results   Component Value Date    INR 0.88 02/25/2023    PROTIME 11.4 (L) 02/25/2023          Lab Results   Component Value Date/Time    PHOS 2.3 02/27/2023 01:22 AM    PHOS 2.4 02/26/2023 09:49 AM    PHOS 2.5 12/12/2015 06:00 AM      No results for input(s): PH, PO2ART, OAX7LQJ, HCO3, BEART, O2SAT in the last 72 hours.       Wt Readings from Last 3 Encounters:   02/27/23 196 lb (88.9 kg)   02/21/23 193 lb 9.6 oz (87.8 kg)   02/13/23 199 lb (90.3 kg)               ASSESMENT  Ac resp failure  Ac copd  pneumonia        PLAN  Cpm  Homwe o2 eval  If dc then fu as outpt    2/27/2023  Stephanie Johnson MD, M.JIMI.

## 2023-02-27 NOTE — PROGRESS NOTES
2/27/2023 12:10 PM  Patient Room #: 5767/7035-V  Patient Name: Caro Skinner    (Step 1 Done by RN if possible otherwise call Pulmonary Diagnostics)  Place patient on room air at rest for at least 30 minutes. If patient falls below 88% before 30 minutes then you can record the level and stop. Record room air saturation level _92_ %. If patient is at 88% or below, they will qualify for home oxygen and you can stop. If level does not fall below 88%, fill in level above. If indicated continue to Step 2. Signature:___June Cosby RRT__ Date: _02/27/2023__  (Step 2&3 Done by RCP)  Ambulate patient on room air until saturation falls below 89%. Record level of room air saturation with ambulation__90_ %. Next, place patient back on ___lpm oxygen and ambulate, record level __%. (Note:  this level must show improvement from room air level done with ambulation.)  If patients saturation on room air with ambulation is 88% or below AND patient shows improvement with oxygen during ambulation, they will qualify for home oxygen and you can stop. If patient does not drop below 89%, then patient should have an overnight oximetry trending on room air to see if level falls below 88%. Complete level in Step 3 below. Room air overnight oximetry level 88 % for___  cumulative minutes. If patients room air oxygen level is < 89% for at least 5 cumulative minutes, patient will qualify for home oxygen and you can stop. (Attach Night Trending Report)    Complete order below: Diagnosis:__COPD, CAD, Pneumonia__  Home oxygen at:  Length of Need: ?X Lifetime ?  3 Months     ___lpm or __%   via  [] nasal cannula  []mask  [] other         []continuous []  with activity  []  Nocturnal   [] Portable Tanks []  Concentrator  [] Conserving Device        Therapist Signature:_______     Date:  ___  Physician Signature:  __Electronically Signed in EMR_    Date:___  Physician Printed Name:  Louann Moseley MD  NPI: [id number]_    [] Patient Qualifies      [] Patient Does NOT qualify

## 2023-02-28 NOTE — DISCHARGE SUMMARY
Discharge Summary    Name:  Serenity Brenner /Age/Sex: 1950  (74 y.o. male)   MRN & CSN:  [id number] & [id number] Admission Date/Time: 2023  6:00 PM   Attending:  No att. providers found Discharging Physician: Tonny Tapia MD     Hospital Course:   Serenity Brenner is a 74 y.o.  male  who presents with Community acquired pneumonia of right lower lobe of lung    HPI  \"Patient is a 70-year-old male with a PMHx of CAD, Hx of prostate cancer s/p prostatectomy in 2015, severe COPD (not on O2), HLD, Bullous pemphigoid and tobacco abuse who presented to the ED with worsening SOB and subjective fevers x2 days. No known sick contacts or travels. No change in cough or sputum. No CP, N/V, abdominal pain, diarrhea or urinary changes. Continues to smoke 1-1.5 PPD, and occasional alcohol use. \"       The following problems have been addressed during this hospitalization. Sepsis and acute hypoxemic respiratory failure secondary to community-acquired pneumonia  - Presented with worsening SOB and subjective fevers x2 days. No known sick contacts or travels. - Requiring 2L NC in the ED. AF, borderline tachycardia (on BB), mild leukocytosis of 15.0. LA ordered, no AG. CXR showing RLL pneumonia with small parapneumonia fluid. Resp viral panel, resp cx, legionella, and streptococcus  negative. - received Rocephin/Azithro for 3 days, discharged on azith+cefdinir PO    Severe COPD (not on O2 at baseline)  - PFTs in 2015 showing severe COPD.  - Continues to smoke. Compliant with inhalers. - No change in cough or sputum. No wheezing on exam.  - pt received IV steroids; discharged on prednisone PO for 2 more days  - home meds     CHELSEY - resolving  - Hx of right nephrectomy in 2018. - Cr 1.8 on admission, baseline ~ 1.2.  - received IVF       CAD  - Allegedly stress test in 2022 was negative. - Continue ASA and Lipitor   - home Lopressor dose decreased to 12.5 mg BID due to hypotension.  Pt to follow up with cardiology outpatient. Bullous pemphigoid  - Followed by [facility]. - on Dapsone. Tobacco abuse  - Hx of smoking 1-1.5 PPD for over 50 years. - Advised on importance of complete cessation.  - Nicotine patches prn. Hx of prostate cancer s/p prostatectomy in 12/2015  - Followed by Urology. - Continue Flomax    Patient was seen and examined at bedside on day of discharge. This note can be used as the progress note for today's visit. Pt off O2. Feels better. Still has cough but improved. No new complaints or concerns. Denies lightheadedness, dizziness, fever, night sweats, chills, chest pain, cough, dyspnea, palpitations, abd pain, nausea, vomiting, diarrhea, dysuria. Physical Exam  Vitals:   Vitals:    02/27/23 1117   BP: 117/63   Pulse:    Resp:    Temp: 98.7 °F (37.1 °C)   SpO2:        General: NAD  Eyes: EOMI  ENT: neck supple  Cardiovascular: Regular rate. Respiratory: Clear to auscultation  Gastrointestinal: Soft, non tender  Genitourinary: no suprapubic tenderness  Musculoskeletal: No edema  Skin: warm, dry  Neuro: Alert. Psych: Mood appropriate. The patient expressed appropriate understanding of and agreement with the discharge recommendations, medications, and plan.      Consults this admission:  IP CONSULT TO NEPHROLOGY      Discharge Instruction:   Handoff to PCP:     Follow up appointments: pulmonology, cardiology, nephrology  Primary care physician: Lorie Mckee MD      Diet:  cardiac diet   Activity: activity as tolerated  Disposition: Discharged to:   [x]Home, [][facility], []SNF, []Acute Rehab, []Hospice   Condition on discharge: Stable    Discharge Medications:        Medication List        START taking these medications      azithromycin 500 MG tablet  Commonly known as: ZITHROMAX  Take 1 tablet by mouth daily for 2 doses     cefdinir 300 MG capsule  Commonly known as: OMNICEF  Take 1 capsule by mouth 2 times daily for 4 days     predniSONE 20 MG tablet  Commonly known as: DELTASONE  Take 2 tablets by mouth daily for 1 dose            CHANGE how you take these medications      atorvastatin 40 MG tablet  Commonly known as: LIPITOR  Take 1 tablet by mouth daily  What changed: when to take this     metoprolol tartrate 25 MG tablet  Commonly known as: LOPRESSOR  Take 0.5 tablets by mouth 2 times daily  What changed: how much to take            CONTINUE taking these medications      acetaminophen 500 MG tablet  Commonly known as: TYLENOL     aspirin 81 MG EC tablet  Take 1 tablet by mouth daily     dapsone 100 MG tablet     docusate sodium 100 MG capsule  Commonly known as: COLACE  Take 1 capsule by mouth daily as needed for Constipation     doxepin 25 MG capsule  Commonly known as: SINEQUAN     fluticasone-salmeterol 250-50 MCG/ACT Aepb diskus inhaler  Commonly known as: ADVAIR     furosemide 40 MG tablet  Commonly known as: LASIX  Take 0.5 tablets by mouth every other day     * nicotine 21 MG/24HR  Commonly known as: NICODERM CQ  Place 1 patch onto the skin daily     * nicotine 14 MG/24HR  Commonly known as: NICODERM CQ  Place 1 patch onto the skin daily     * nicotine 7 MG/24HR  Commonly known as: NICODERM CQ  Place 1 patch onto the skin daily for 14 days     nicotine polacrilex 2 MG lozenge  Commonly known as: COMMIT  Take 1 lozenge by mouth every hour as needed for Smoking cessation     pantoprazole 40 MG tablet  Commonly known as: PROTONIX  TAKE 1 TABLET BY MOUTH DAILY     Spiriva HandiHaler 18 MCG inhalation capsule  Generic drug: tiotropium  INHALE THE CONTENTS OF 1 CAPSULE INTO THE LUNGS DAILY     tamsulosin 0.4 MG capsule  Commonly known as: FLOMAX     terbinafine 1 % cream  Commonly known as: LAMISIL  Apply topically 2 times daily. * This list has 3 medication(s) that are the same as other medications prescribed for you. Read the directions carefully, and ask your doctor or other care provider to review them with you.                    Where to Get Your Medications These medications were sent to [facility], [address], [facility] [address]      Phone: [phone]   azithromycin 500 MG tablet  cefdinir 300 MG capsule  metoprolol tartrate 25 MG tablet  predniSONE 20 MG tablet         Objective Findings at Discharge:       BMP/CBC  Recent Labs     02/26/23  0949 02/27/23  0122    136   K 4.2 4.2    105   CO2 25 22   BUN 31* 29*   CREATININE 1.4* 1.2   WBC  --  11.2*   HCT  --  31.7*   PLT  --  211       IMAGING:      Additional Information: Patient seen and examined day of discharge.  For more information regarding patient's care please contact [facility] records [phone]    Discharge Time of 35 minutes    Electronically signed by Yanique Salguero MD on 2/28/2023 at 2:55 PM

## 2023-03-02 LAB
CULTURE: NORMAL
CULTURE: NORMAL
Lab: NORMAL
Lab: NORMAL
SPECIMEN: NORMAL
SPECIMEN: NORMAL

## 2023-03-08 ENCOUNTER — HOSPITAL ENCOUNTER (OUTPATIENT)
Age: 73
Discharge: HOME | End: 2023-03-08
Payer: MEDICARE

## 2023-03-08 ENCOUNTER — HOSPITAL ENCOUNTER (OUTPATIENT)
Dept: GENERAL RADIOLOGY | Age: 73
Discharge: HOME | End: 2023-03-08
Payer: MEDICARE

## 2023-03-08 DIAGNOSIS — R20.2 RIGHT HAND PARESTHESIA: ICD-10-CM

## 2023-03-08 LAB
ALBUMIN SERPL-MCNC: 4.2 GM/DL (ref 3.4–5)
ALP BLD-CCNC: 108 IU/L (ref 40–129)
ALT SERPL-CCNC: 16 U/L (ref 10–40)
ANION GAP SERPL CALCULATED.3IONS-SCNC: 9 MMOL/L (ref 4–16)
AST SERPL-CCNC: 21 IU/L (ref 15–37)
BASOPHILS ABSOLUTE: 0.1 K/CU MM
BASOPHILS ABSOLUTE: 0.1 K/CU MM
BASOPHILS RELATIVE PERCENT: 1.2 % (ref 0–1)
BASOPHILS RELATIVE PERCENT: 1.2 % (ref 0–1)
BILIRUB SERPL-MCNC: 0.6 MG/DL (ref 0–1)
BILIRUBIN DIRECT: 0.2 MG/DL (ref 0–0.3)
BILIRUBIN, INDIRECT: 0.4 MG/DL (ref 0–0.7)
BUN SERPL-MCNC: 15 MG/DL (ref 6–23)
CALCIUM SERPL-MCNC: 9.3 MG/DL (ref 8.3–10.6)
CHLORIDE BLD-SCNC: 101 MMOL/L (ref 99–110)
CHOLEST SERPL-MCNC: 140 MG/DL
CO2: 26 MMOL/L (ref 21–32)
CREAT SERPL-MCNC: 1.6 MG/DL (ref 0.9–1.3)
DIFFERENTIAL TYPE: ABNORMAL
DIFFERENTIAL TYPE: ABNORMAL
EOSINOPHILS ABSOLUTE: 1 K/CU MM
EOSINOPHILS ABSOLUTE: 1 K/CU MM
EOSINOPHILS RELATIVE PERCENT: 9.4 % (ref 0–3)
EOSINOPHILS RELATIVE PERCENT: 9.4 % (ref 0–3)
ESTIMATED AVERAGE GLUCOSE: 80 MG/DL
GFR SERPL CREATININE-BSD FRML MDRD: 45 ML/MIN/1.73M2
GLUCOSE SERPL-MCNC: 94 MG/DL (ref 70–99)
HBA1C MFR BLD: 4.4 % (ref 4.2–6.3)
HCT VFR BLD CALC: 38.4 % (ref 42–52)
HCT VFR BLD CALC: 38.4 % (ref 42–52)
HDLC SERPL-MCNC: 54 MG/DL
HEMOGLOBIN: 12 GM/DL (ref 13.5–18)
HEMOGLOBIN: 12 GM/DL (ref 13.5–18)
IMMATURE NEUTROPHIL %: 0.6 % (ref 0–0.43)
IMMATURE NEUTROPHIL %: 0.6 % (ref 0–0.43)
LDLC SERPL CALC-MCNC: 66 MG/DL
LYMPHOCYTES ABSOLUTE: 2 K/CU MM
LYMPHOCYTES ABSOLUTE: 2 K/CU MM
LYMPHOCYTES RELATIVE PERCENT: 18.6 % (ref 24–44)
LYMPHOCYTES RELATIVE PERCENT: 18.6 % (ref 24–44)
MCH RBC QN AUTO: 34 PG (ref 27–31)
MCH RBC QN AUTO: 34 PG (ref 27–31)
MCHC RBC AUTO-ENTMCNC: 31.3 % (ref 32–36)
MCHC RBC AUTO-ENTMCNC: 31.3 % (ref 32–36)
MCV RBC AUTO: 108.8 FL (ref 78–100)
MCV RBC AUTO: 108.8 FL (ref 78–100)
MONOCYTES ABSOLUTE: 0.9 K/CU MM
MONOCYTES ABSOLUTE: 0.9 K/CU MM
MONOCYTES RELATIVE PERCENT: 8.2 % (ref 0–4)
MONOCYTES RELATIVE PERCENT: 8.2 % (ref 0–4)
NUCLEATED RBC %: 0 %
NUCLEATED RBC %: 0 %
PDW BLD-RTO: 13 % (ref 11.7–14.9)
PDW BLD-RTO: 13 % (ref 11.7–14.9)
PLATELET # BLD: 420 K/CU MM (ref 140–440)
PLATELET # BLD: 420 K/CU MM (ref 140–440)
PMV BLD AUTO: 8.6 FL (ref 7.5–11.1)
PMV BLD AUTO: 8.6 FL (ref 7.5–11.1)
POTASSIUM SERPL-SCNC: 4.7 MMOL/L (ref 3.5–5.1)
RBC # BLD: 3.53 M/CU MM (ref 4.6–6.2)
RBC # BLD: 3.53 M/CU MM (ref 4.6–6.2)
SEGMENTED NEUTROPHILS ABSOLUTE COUNT: 6.8 K/CU MM
SEGMENTED NEUTROPHILS ABSOLUTE COUNT: 6.8 K/CU MM
SEGMENTED NEUTROPHILS RELATIVE PERCENT: 62 % (ref 36–66)
SEGMENTED NEUTROPHILS RELATIVE PERCENT: 62 % (ref 36–66)
SODIUM BLD-SCNC: 136 MMOL/L (ref 135–145)
TOTAL IMMATURE NEUTOROPHIL: 0.07 K/CU MM
TOTAL IMMATURE NEUTOROPHIL: 0.07 K/CU MM
TOTAL NUCLEATED RBC: 0 K/CU MM
TOTAL NUCLEATED RBC: 0 K/CU MM
TOTAL PROTEIN: 6.2 GM/DL (ref 6.4–8.2)
TRIGL SERPL-MCNC: 102 MG/DL
WBC # BLD: 10.9 K/CU MM (ref 4–10.5)
WBC # BLD: 10.9 K/CU MM (ref 4–10.5)

## 2023-03-08 PROCEDURE — 80053 COMPREHEN METABOLIC PANEL: CPT

## 2023-03-08 PROCEDURE — 82248 BILIRUBIN DIRECT: CPT

## 2023-03-08 PROCEDURE — 72040 X-RAY EXAM NECK SPINE 2-3 VW: CPT

## 2023-03-08 PROCEDURE — 80048 BASIC METABOLIC PNL TOTAL CA: CPT

## 2023-03-08 PROCEDURE — 85025 COMPLETE CBC W/AUTO DIFF WBC: CPT

## 2023-03-08 PROCEDURE — 80061 LIPID PANEL: CPT

## 2023-03-08 PROCEDURE — 83036 HEMOGLOBIN GLYCOSYLATED A1C: CPT

## 2023-03-08 PROCEDURE — 36415 COLL VENOUS BLD VENIPUNCTURE: CPT

## 2023-03-09 DIAGNOSIS — M54.12 CERVICAL RADICULOPATHY: Primary | ICD-10-CM

## 2023-03-09 DIAGNOSIS — N17.9 AKI (ACUTE KIDNEY INJURY) (HCC): ICD-10-CM

## 2023-03-09 DIAGNOSIS — M99.79 DISC NARROWING: ICD-10-CM

## 2023-03-09 RX ORDER — FUROSEMIDE 40 MG/1
40 TABLET ORAL EVERY OTHER DAY
Qty: 30 TABLET | Refills: 1
Start: 2023-03-09

## 2023-03-09 NOTE — RESULT ENCOUNTER NOTE
TELL PT;  -THE CREATININE IS 1.6 FROM 1.2 [address] [facility], WE WILL MONITOR IT  -DID YOU LOSE THE WATER WEIGHT WITH THE LASIX?  -HOW MUCH LASIX ARE YOU ON CURRENTLY?   THX

## 2023-03-24 ENCOUNTER — HOSPITAL ENCOUNTER (OUTPATIENT)
Dept: MRI IMAGING | Age: 73
Discharge: HOME | End: 2023-03-24
Payer: MEDICARE

## 2023-03-24 ENCOUNTER — HOSPITAL ENCOUNTER (OUTPATIENT)
Age: 73
Discharge: HOME | End: 2023-03-24
Payer: MEDICARE

## 2023-03-24 DIAGNOSIS — M54.12 CERVICAL RADICULOPATHY: ICD-10-CM

## 2023-03-24 DIAGNOSIS — M99.79 DISC NARROWING: ICD-10-CM

## 2023-03-24 LAB
ANION GAP SERPL CALCULATED.3IONS-SCNC: 8 MMOL/L (ref 4–16)
BUN SERPL-MCNC: 14 MG/DL (ref 6–23)
CALCIUM SERPL-MCNC: 9.1 MG/DL (ref 8.3–10.6)
CHLORIDE BLD-SCNC: 98 MMOL/L (ref 99–110)
CO2: 27 MMOL/L (ref 21–32)
CREAT SERPL-MCNC: 1.6 MG/DL (ref 0.9–1.3)
GFR SERPL CREATININE-BSD FRML MDRD: 45 ML/MIN/1.73M2
GLUCOSE SERPL-MCNC: 87 MG/DL (ref 70–99)
POTASSIUM SERPL-SCNC: 4.6 MMOL/L (ref 3.5–5.1)
SODIUM BLD-SCNC: 133 MMOL/L (ref 135–145)

## 2023-03-24 PROCEDURE — 72141 MRI NECK SPINE W/O DYE: CPT

## 2023-03-24 PROCEDURE — 36415 COLL VENOUS BLD VENIPUNCTURE: CPT

## 2023-03-24 PROCEDURE — 80048 BASIC METABOLIC PNL TOTAL CA: CPT

## 2023-05-03 ENCOUNTER — HOSPITAL ENCOUNTER (OUTPATIENT)
Age: 73
Discharge: HOME | End: 2023-05-03
Payer: MEDICARE

## 2023-05-03 DIAGNOSIS — N18.30 STAGE 3 CHRONIC KIDNEY DISEASE, UNSPECIFIED WHETHER STAGE 3A OR 3B CKD (HCC): ICD-10-CM

## 2023-05-03 LAB
ANION GAP SERPL CALCULATED.3IONS-SCNC: 11 MMOL/L (ref 4–16)
BUN SERPL-MCNC: 25 MG/DL (ref 6–23)
CALCIUM SERPL-MCNC: 9.3 MG/DL (ref 8.3–10.6)
CHLORIDE BLD-SCNC: 101 MMOL/L (ref 99–110)
CO2: 25 MMOL/L (ref 21–32)
CREAT SERPL-MCNC: 1.6 MG/DL (ref 0.9–1.3)
GFR SERPL CREATININE-BSD FRML MDRD: 45 ML/MIN/1.73M2
GLUCOSE SERPL-MCNC: 92 MG/DL (ref 70–99)
POTASSIUM SERPL-SCNC: 4.6 MMOL/L (ref 3.5–5.1)
SODIUM BLD-SCNC: 137 MMOL/L (ref 135–145)

## 2023-05-03 PROCEDURE — 80048 BASIC METABOLIC PNL TOTAL CA: CPT

## 2023-05-03 PROCEDURE — 36415 COLL VENOUS BLD VENIPUNCTURE: CPT

## 2023-09-29 ENCOUNTER — HOSPITAL ENCOUNTER (OUTPATIENT)
Age: 73
Discharge: HOME | End: 2023-09-29
Payer: MEDICARE

## 2023-09-29 LAB
ALBUMIN SERPL-MCNC: 4.5 GM/DL (ref 3.4–5)
ALP BLD-CCNC: 85 IU/L (ref 40–129)
ALT SERPL-CCNC: 20 U/L (ref 10–40)
ANION GAP SERPL CALCULATED.3IONS-SCNC: 13 MMOL/L (ref 4–16)
AST SERPL-CCNC: 21 IU/L (ref 15–37)
BASOPHILS ABSOLUTE: 0 K/CU MM
BASOPHILS RELATIVE PERCENT: 0.3 % (ref 0–1)
BILIRUB SERPL-MCNC: 1.1 MG/DL (ref 0–1)
BILIRUBIN DIRECT: 0.3 MG/DL (ref 0–0.3)
BILIRUBIN, INDIRECT: 0.8 MG/DL (ref 0–0.7)
BUN SERPL-MCNC: 21 MG/DL (ref 6–23)
CALCIUM SERPL-MCNC: 9.6 MG/DL (ref 8.3–10.6)
CHLORIDE BLD-SCNC: 100 MMOL/L (ref 99–110)
CO2: 22 MMOL/L (ref 21–32)
CREAT SERPL-MCNC: 1.6 MG/DL (ref 0.9–1.3)
DIFFERENTIAL TYPE: ABNORMAL
EOSINOPHILS ABSOLUTE: 0 K/CU MM
EOSINOPHILS RELATIVE PERCENT: 0.1 % (ref 0–3)
GFR SERPL CREATININE-BSD FRML MDRD: 45 ML/MIN/1.73M2
GLUCOSE SERPL-MCNC: 111 MG/DL (ref 70–99)
HCT VFR BLD CALC: 38.2 % (ref 42–52)
HEMOGLOBIN: 12.4 GM/DL (ref 13.5–18)
IMMATURE NEUTROPHIL %: 0.8 % (ref 0–0.43)
LYMPHOCYTES ABSOLUTE: 0.6 K/CU MM
LYMPHOCYTES RELATIVE PERCENT: 7.9 % (ref 24–44)
MCH RBC QN AUTO: 34.5 PG (ref 27–31)
MCHC RBC AUTO-ENTMCNC: 32.5 % (ref 32–36)
MCV RBC AUTO: 106.4 FL (ref 78–100)
MONOCYTES ABSOLUTE: 0.3 K/CU MM
MONOCYTES RELATIVE PERCENT: 3.3 % (ref 0–4)
NUCLEATED RBC %: 0 %
PDW BLD-RTO: 15.1 % (ref 11.7–14.9)
PLATELET # BLD: 319 K/CU MM (ref 140–440)
PMV BLD AUTO: 9.2 FL (ref 7.5–11.1)
POTASSIUM SERPL-SCNC: 4.6 MMOL/L (ref 3.5–5.1)
RBC # BLD: 3.59 M/CU MM (ref 4.6–6.2)
SEGMENTED NEUTROPHILS ABSOLUTE COUNT: 7 K/CU MM
SEGMENTED NEUTROPHILS RELATIVE PERCENT: 87.6 % (ref 36–66)
SODIUM BLD-SCNC: 135 MMOL/L (ref 135–145)
TOTAL IMMATURE NEUTOROPHIL: 0.06 K/CU MM
TOTAL NUCLEATED RBC: 0 K/CU MM
TOTAL PROTEIN: 6.5 GM/DL (ref 6.4–8.2)
WBC # BLD: 8 K/CU MM (ref 4–10.5)

## 2023-09-29 PROCEDURE — 82248 BILIRUBIN DIRECT: CPT

## 2023-09-29 PROCEDURE — 85025 COMPLETE CBC W/AUTO DIFF WBC: CPT

## 2023-09-29 PROCEDURE — 80053 COMPREHEN METABOLIC PANEL: CPT

## 2023-09-29 PROCEDURE — 36415 COLL VENOUS BLD VENIPUNCTURE: CPT

## 2024-02-12 ENCOUNTER — APPOINTMENT (OUTPATIENT)
Dept: GENERAL RADIOLOGY | Age: 74
End: 2024-02-12
Payer: MEDICARE

## 2024-02-12 ENCOUNTER — HOSPITAL ENCOUNTER (EMERGENCY)
Age: 74
Discharge: HOME | End: 2024-02-12
Payer: MEDICARE

## 2024-02-12 VITALS
SYSTOLIC BLOOD PRESSURE: 123 MMHG | DIASTOLIC BLOOD PRESSURE: 73 MMHG | OXYGEN SATURATION: 92 % | TEMPERATURE: 98.7 F | RESPIRATION RATE: 19 BRPM | HEART RATE: 79 BPM

## 2024-02-12 DIAGNOSIS — R68.89 FLU-LIKE SYMPTOMS: Primary | ICD-10-CM

## 2024-02-12 DIAGNOSIS — J44.9 CHRONIC OBSTRUCTIVE PULMONARY DISEASE, UNSPECIFIED COPD TYPE (HCC): ICD-10-CM

## 2024-02-12 LAB
INFLUENZA A ANTIGEN: NOT DETECTED
INFLUENZA B ANTIGEN: NOT DETECTED
SARS-COV-2 RDRP RESP QL NAA+PROBE: NOT DETECTED
SOURCE: NORMAL

## 2024-02-12 PROCEDURE — 6370000000 HC RX 637 (ALT 250 FOR IP): Performed by: PHYSICIAN ASSISTANT

## 2024-02-12 PROCEDURE — 94640 AIRWAY INHALATION TREATMENT: CPT

## 2024-02-12 PROCEDURE — 87502 INFLUENZA DNA AMP PROBE: CPT

## 2024-02-12 PROCEDURE — 93005 ELECTROCARDIOGRAM TRACING: CPT | Performed by: EMERGENCY MEDICINE

## 2024-02-12 PROCEDURE — 71045 X-RAY EXAM CHEST 1 VIEW: CPT

## 2024-02-12 PROCEDURE — 87635 SARS-COV-2 COVID-19 AMP PRB: CPT

## 2024-02-12 PROCEDURE — 99285 EMERGENCY DEPT VISIT HI MDM: CPT

## 2024-02-12 RX ORDER — IPRATROPIUM BROMIDE AND ALBUTEROL SULFATE 2.5; .5 MG/3ML; MG/3ML
1 SOLUTION RESPIRATORY (INHALATION) ONCE
Status: COMPLETED | OUTPATIENT
Start: 2024-02-12 | End: 2024-02-12

## 2024-02-12 RX ORDER — AZITHROMYCIN 250 MG/1
TABLET, FILM COATED ORAL
Qty: 1 PACKET | Refills: 0 | Status: SHIPPED | OUTPATIENT
Start: 2024-02-12 | End: 2024-02-16

## 2024-02-12 RX ORDER — AZITHROMYCIN 250 MG/1
500 TABLET, FILM COATED ORAL ONCE
Status: COMPLETED | OUTPATIENT
Start: 2024-02-12 | End: 2024-02-12

## 2024-02-12 RX ORDER — PREDNISONE 20 MG/1
40 TABLET ORAL ONCE
Status: COMPLETED | OUTPATIENT
Start: 2024-02-12 | End: 2024-02-12

## 2024-02-12 RX ORDER — PREDNISONE 20 MG/1
40 TABLET ORAL DAILY
Qty: 10 TABLET | Refills: 0 | Status: SHIPPED | OUTPATIENT
Start: 2024-02-12 | End: 2024-02-17

## 2024-02-12 RX ADMIN — AZITHROMYCIN DIHYDRATE 500 MG: 250 TABLET ORAL at 19:38

## 2024-02-12 RX ADMIN — IPRATROPIUM BROMIDE AND ALBUTEROL SULFATE 1 DOSE: 2.5; .5 SOLUTION RESPIRATORY (INHALATION) at 19:43

## 2024-02-12 RX ADMIN — PREDNISONE 40 MG: 20 TABLET ORAL at 19:38

## 2024-02-13 LAB
EKG ATRIAL RATE: 79 BPM
EKG DIAGNOSIS: NORMAL
EKG P AXIS: 45 DEGREES
EKG P-R INTERVAL: 142 MS
EKG Q-T INTERVAL: 366 MS
EKG QRS DURATION: 88 MS
EKG QTC CALCULATION (BAZETT): 419 MS
EKG R AXIS: 100 DEGREES
EKG T AXIS: 81 DEGREES
EKG VENTRICULAR RATE: 79 BPM

## 2024-02-13 PROCEDURE — 93010 ELECTROCARDIOGRAM REPORT: CPT | Performed by: INTERNAL MEDICINE

## 2024-02-13 NOTE — ED NOTES
Ambulated 300 ft  with Sba X 1 o2 Sats 88% room air, exertional dyspena. Assisted back into bed  O2 sat 93% r/a.

## 2024-02-13 NOTE — ED PROVIDER NOTES
12 lead EKG per my interpretation:  Normal Sinus Rhythm at 79  Axis is   Normal  QTc is  within an acceptable range  There is specific T wave changes appreciated; isolated T wave inversion in aVL.  There is no specific ST wave changes appreciated.    Prior EKG to compare with was available and T wave inversion present on prior.    MD Bassam Dow Andrew, MD  02/12/24 2175    
other day, Disp-30 tablet, R-1NO PRINT      atorvastatin (LIPITOR) 40 MG tablet Take 0.5 tablets by mouth every other day, Disp-30 tablet, R-2NO PRINT      metoprolol tartrate (LOPRESSOR) 25 MG tablet Take 0.5 tablets by mouth 2 times daily, Disp-60 tablet, R-3Normal      dapsone 100 MG tablet Take 1 tablet by mouth dailyHistorical Med      doxepin (SINEQUAN) 25 MG capsule Take 1 capsule by mouth at bedtimeHistorical Med      fluticasone-salmeterol (ADVAIR) 250-50 MCG/ACT AEPB diskus inhaler Inhale 1 puff into the lungs in the morning and 1 puff in the evening.Historical Med      nicotine (NICODERM CQ) 21 MG/24HR Place 1 patch onto the skin daily, Disp-30 patch, R-3Normal      nicotine polacrilex (COMMIT) 2 MG lozenge Take 1 lozenge by mouth every hour as needed for Smoking cessation, Disp-100 each, R-3Normal      terbinafine (LAMISIL) 1 % cream Apply topically 2 times daily., Disp-42 g, R-2, Normal      tamsulosin (FLOMAX) 0.4 MG capsule Take 1 capsule by mouth dailyHistorical Med      pantoprazole (PROTONIX) 40 MG tablet TAKE 1 TABLET BY MOUTH DAILY, Disp-90 tablet, R-1Normal      SPIRIVA HANDIHALER 18 MCG inhalation capsule INHALE THE CONTENTS OF 1 CAPSULE INTO THE LUNGS DAILY, Disp-90 capsule, R-3Normal      aspirin 81 MG EC tablet Take 1 tablet by mouth daily, Disp-30 tablet, R-0      acetaminophen (TYLENOL) 500 MG tablet Take 2 tablets by mouth every 6 hoursHistorical Med      nicotine (NICODERM CQ) 7 MG/24HR Place 1 patch onto the skin daily for 14 days, Disp-14 patch, R-0Normal               SCREENINGS                        CIWA Assessment  BP: 123/73  Pulse: 79             PHYSICAL EXAM    ED Triage Vitals 02/12/24 1802   BP Temp Temp Source Pulse Respirations SpO2 Height Weight   123/73 98.7 °F (37.1 °C) Oral 79 19 90 % -- --       Physical Exam  Constitutional:       Appearance: He is well-developed.   HENT:      Head: Atraumatic.      Nose: No rhinorrhea.   Eyes:      Extraocular Movements: Extraocular

## 2024-02-13 NOTE — DISCHARGE INSTRUCTIONS
Please take tylenol 1000mg every 6 hours.    Please continue prednisone, azithromycin, and your  inhalers.      Return with any confusion, chest pain  , confusion, worsening symptoms or any new concerns.

## 2024-04-15 ENCOUNTER — CLINICAL DOCUMENTATION (OUTPATIENT)
Dept: ONCOLOGY | Age: 74
End: 2024-04-15

## 2024-05-20 ENCOUNTER — HOSPITAL ENCOUNTER (OUTPATIENT)
Age: 74
Discharge: HOME | End: 2024-05-20
Payer: MEDICARE

## 2024-05-20 LAB
ALBUMIN SERPL-MCNC: 4.2 GM/DL (ref 3.4–5)
ALP BLD-CCNC: 155 IU/L (ref 40–129)
ALT SERPL-CCNC: 10 U/L (ref 10–40)
ANION GAP SERPL CALCULATED.3IONS-SCNC: 10 MMOL/L (ref 7–16)
AST SERPL-CCNC: 20 IU/L (ref 15–37)
BASOPHILS ABSOLUTE: 0.1 K/CU MM
BASOPHILS RELATIVE PERCENT: 0.9 % (ref 0–1)
BILIRUB SERPL-MCNC: 0.6 MG/DL (ref 0–1)
BILIRUBIN DIRECT: 0.2 MG/DL (ref 0–0.3)
BILIRUBIN, INDIRECT: 0.4 MG/DL (ref 0–0.7)
BUN SERPL-MCNC: 16 MG/DL (ref 6–23)
CALCIUM SERPL-MCNC: 9.3 MG/DL (ref 8.3–10.6)
CHLORIDE BLD-SCNC: 98 MMOL/L (ref 99–110)
CO2: 26 MMOL/L (ref 21–32)
CREAT SERPL-MCNC: 1.4 MG/DL (ref 0.9–1.3)
DIFFERENTIAL TYPE: ABNORMAL
EOSINOPHILS ABSOLUTE: 0.4 K/CU MM
EOSINOPHILS RELATIVE PERCENT: 4.7 % (ref 0–3)
GFR, ESTIMATED: 52 ML/MIN/1.73M2
GLUCOSE SERPL-MCNC: 95 MG/DL (ref 70–99)
HCT VFR BLD CALC: 39.2 % (ref 42–52)
HEMOGLOBIN: 12.5 GM/DL (ref 13.5–18)
IMMATURE NEUTROPHIL %: 0.4 % (ref 0–0.43)
LYMPHOCYTES ABSOLUTE: 1.5 K/CU MM
LYMPHOCYTES RELATIVE PERCENT: 17.2 % (ref 24–44)
MCH RBC QN AUTO: 33.1 PG (ref 27–31)
MCHC RBC AUTO-ENTMCNC: 31.9 % (ref 32–36)
MCV RBC AUTO: 103.7 FL (ref 78–100)
MONOCYTES ABSOLUTE: 0.9 K/CU MM
MONOCYTES RELATIVE PERCENT: 10 % (ref 0–4)
NEUTROPHILS ABSOLUTE: 5.7 K/CU MM
NEUTROPHILS RELATIVE PERCENT: 66.8 % (ref 36–66)
NUCLEATED RBC %: 0 %
PDW BLD-RTO: 14.5 % (ref 11.7–14.9)
PLATELET # BLD: 395 K/CU MM (ref 140–440)
PMV BLD AUTO: 8.8 FL (ref 7.5–11.1)
POTASSIUM SERPL-SCNC: 4.9 MMOL/L (ref 3.5–5.1)
RBC # BLD: 3.78 M/CU MM (ref 4.6–6.2)
SODIUM BLD-SCNC: 134 MMOL/L (ref 135–145)
TOTAL IMMATURE NEUTOROPHIL: 0.03 K/CU MM
TOTAL NUCLEATED RBC: 0 K/CU MM
TOTAL PROTEIN: 6.5 GM/DL (ref 6.4–8.2)
WBC # BLD: 8.5 K/CU MM (ref 4–10.5)

## 2024-05-20 PROCEDURE — 82248 BILIRUBIN DIRECT: CPT

## 2024-05-20 PROCEDURE — 80053 COMPREHEN METABOLIC PANEL: CPT

## 2024-05-20 PROCEDURE — 36415 COLL VENOUS BLD VENIPUNCTURE: CPT

## 2024-05-20 PROCEDURE — 85025 COMPLETE CBC W/AUTO DIFF WBC: CPT

## 2025-01-07 ENCOUNTER — APPOINTMENT (OUTPATIENT)
Dept: CT IMAGING | Age: 75
End: 2025-01-07
Payer: MEDICARE

## 2025-01-07 ENCOUNTER — APPOINTMENT (OUTPATIENT)
Dept: GENERAL RADIOLOGY | Age: 75
End: 2025-01-07
Payer: MEDICARE

## 2025-01-07 ENCOUNTER — HOSPITAL ENCOUNTER (EMERGENCY)
Age: 75
Discharge: HOME | End: 2025-01-07
Attending: EMERGENCY MEDICINE
Payer: MEDICARE

## 2025-01-07 VITALS
TEMPERATURE: 98.3 F | HEIGHT: 73 IN | DIASTOLIC BLOOD PRESSURE: 91 MMHG | RESPIRATION RATE: 18 BRPM | OXYGEN SATURATION: 95 % | SYSTOLIC BLOOD PRESSURE: 107 MMHG | WEIGHT: 165 LBS | BODY MASS INDEX: 21.87 KG/M2 | HEART RATE: 83 BPM

## 2025-01-07 DIAGNOSIS — M47.26 OSTEOARTHRITIS OF SPINE WITH RADICULOPATHY, LUMBAR REGION: Primary | ICD-10-CM
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
## 2025-01-07 DIAGNOSIS — M16.0 PRIMARY OSTEOARTHRITIS OF BOTH HIPS: ICD-10-CM

## 2025-01-07 PROCEDURE — 6360000002 HC RX W HCPCS: Performed by: EMERGENCY MEDICINE

## 2025-01-07 PROCEDURE — 72131 CT LUMBAR SPINE W/O DYE: CPT

## 2025-01-07 PROCEDURE — 73502 X-RAY EXAM HIP UNI 2-3 VIEWS: CPT

## 2025-01-07 PROCEDURE — 72170 X-RAY EXAM OF PELVIS: CPT

## 2025-01-07 PROCEDURE — 72192 CT PELVIS W/O DYE: CPT

## 2025-01-07 PROCEDURE — 99284 EMERGENCY DEPT VISIT MOD MDM: CPT

## 2025-01-07 PROCEDURE — 6370000000 HC RX 637 (ALT 250 FOR IP): Performed by: EMERGENCY MEDICINE

## 2025-01-07 RX ORDER — METHOCARBAMOL 500 MG/1
500 TABLET, FILM COATED ORAL 3 TIMES DAILY
Qty: 30 TABLET | Refills: 0 | Status: SHIPPED | OUTPATIENT
Start: 2025-01-07 | End: 2025-01-17

## 2025-01-07 RX ORDER — ACETAMINOPHEN 325 MG/1
650 TABLET ORAL ONCE
Status: COMPLETED | OUTPATIENT
Start: 2025-01-07 | End: 2025-01-07

## 2025-01-07 RX ORDER — PREDNISONE 50 MG/1
50 TABLET ORAL DAILY
Qty: 5 TABLET | Refills: 0 | Status: SHIPPED | OUTPATIENT
Start: 2025-01-07 | End: 2025-01-12

## 2025-01-07 RX ORDER — LIDOCAINE 50 MG/G
1 PATCH TOPICAL DAILY
Qty: 10 PATCH | Refills: 0 | Status: SHIPPED | OUTPATIENT
Start: 2025-01-07 | End: 2025-01-17

## 2025-01-07 RX ORDER — DEXAMETHASONE 4 MG/1
6 TABLET ORAL ONCE
Status: COMPLETED | OUTPATIENT
Start: 2025-01-07 | End: 2025-01-07

## 2025-01-07 RX ORDER — METHOCARBAMOL 500 MG/1
500 TABLET, FILM COATED ORAL ONCE
Status: COMPLETED | OUTPATIENT
Start: 2025-01-07 | End: 2025-01-07

## 2025-01-07 RX ADMIN — ACETAMINOPHEN 650 MG: 325 TABLET ORAL at 16:17

## 2025-01-07 RX ADMIN — DEXAMETHASONE 6 MG: 4 TABLET ORAL at 16:17

## 2025-01-07 RX ADMIN — METHOCARBAMOL TABLETS 500 MG: 500 TABLET, COATED ORAL at 16:17

## 2025-01-07 ASSESSMENT — PAIN SCALES - GENERAL
PAINLEVEL_OUTOF10: 4
PAINLEVEL_OUTOF10: 0
PAINLEVEL_OUTOF10: 8

## 2025-01-07 ASSESSMENT — PAIN DESCRIPTION - DESCRIPTORS
DESCRIPTORS: ACHING
DESCRIPTORS: ACHING

## 2025-01-07 ASSESSMENT — PAIN DESCRIPTION - ORIENTATION: ORIENTATION: LEFT

## 2025-01-07 ASSESSMENT — PAIN DESCRIPTION - PAIN TYPE: TYPE: ACUTE PAIN

## 2025-01-07 ASSESSMENT — PAIN DESCRIPTION - LOCATION: LOCATION: HIP

## 2025-01-07 NOTE — ED TRIAGE NOTES
Fall on 12/14, felt okay but having some pain. Was using walker a few days ago.  Twisted  yesterday, heard and felt crack in left hip.

## 2025-01-07 NOTE — DISCHARGE INSTRUCTIONS
Follow-up with orthospine specialist Dr. Silva for evaluation of chronic changes to the back and pain into the hips.  Call for an appointment  Follow-up with orthopedist Dr. 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  Call for an appointment  Take prednisone as described directed for inflammation pain and swelling  Take Robaxin muscle relaxant as prescribed and directed.  No drink or drive while taking  Apply Lidoderm pain patch to affected area twice a day  Take Tylenol 500 mg p.o. every 4 hours as needed for pain  Return to the emergency department immediately any pain fever chills nausea vomiting dizzy lightheadedness or any worsening symptoms

## 2025-01-07 NOTE — ED PROVIDER NOTES
off.    METHOCARBAMOL (ROBAXIN) 500 MG TABLET    Take 1 tablet by mouth 3 times daily for 10 days    PREDNISONE (DELTASONE) 50 MG TABLET    Take 1 tablet by mouth daily for 5 days     ED Provider Disposition Time  DISPOSITION   6:46 PM                Comment: Please note this report has been produced using speech recognition software and may contain errors related to that system including errors in grammar, punctuation, and spelling, as well as words and phrases that may be inappropriate.  Efforts were made to edit the dictations.        Jen Jennings DO  01/07/25 [id number]